# Patient Record
Sex: MALE | Race: WHITE | NOT HISPANIC OR LATINO | Employment: OTHER | URBAN - METROPOLITAN AREA
[De-identification: names, ages, dates, MRNs, and addresses within clinical notes are randomized per-mention and may not be internally consistent; named-entity substitution may affect disease eponyms.]

---

## 2017-03-01 ENCOUNTER — COMMUNICATION - HEALTHEAST (OUTPATIENT)
Dept: INTERNAL MEDICINE | Facility: CLINIC | Age: 62
End: 2017-03-01

## 2017-03-01 DIAGNOSIS — E11.9 TYPE 2 DIABETES MELLITUS WITHOUT COMPLICATION (H): ICD-10-CM

## 2017-03-01 DIAGNOSIS — E78.5 HYPERLIPEMIA: ICD-10-CM

## 2017-03-01 DIAGNOSIS — I10 HYPERTENSION: ICD-10-CM

## 2017-03-22 ENCOUNTER — RECORDS - HEALTHEAST (OUTPATIENT)
Dept: ADMINISTRATIVE | Facility: OTHER | Age: 62
End: 2017-03-22

## 2017-03-25 ENCOUNTER — COMMUNICATION - HEALTHEAST (OUTPATIENT)
Dept: INTERNAL MEDICINE | Facility: CLINIC | Age: 62
End: 2017-03-25

## 2017-03-25 DIAGNOSIS — E11.9 TYPE 2 DIABETES MELLITUS WITHOUT COMPLICATION (H): ICD-10-CM

## 2017-03-30 ENCOUNTER — OFFICE VISIT - HEALTHEAST (OUTPATIENT)
Dept: INTERNAL MEDICINE | Facility: CLINIC | Age: 62
End: 2017-03-30

## 2017-03-30 DIAGNOSIS — R73.9 HYPERGLYCEMIA: ICD-10-CM

## 2017-03-30 DIAGNOSIS — I10 ESSENTIAL HYPERTENSION: ICD-10-CM

## 2017-03-30 DIAGNOSIS — Z00.00 ROUTINE GENERAL MEDICAL EXAMINATION AT A HEALTH CARE FACILITY: ICD-10-CM

## 2017-03-30 DIAGNOSIS — H93.19 TINNITUS: ICD-10-CM

## 2017-03-30 DIAGNOSIS — I25.10 CORONARY ARTERY CALCIFICATION SEEN ON CAT SCAN: ICD-10-CM

## 2017-03-30 DIAGNOSIS — Z12.5 SPECIAL SCREENING FOR MALIGNANT NEOPLASM OF PROSTATE: ICD-10-CM

## 2017-03-30 DIAGNOSIS — E04.1 RIGHT THYROID NODULE: ICD-10-CM

## 2017-03-30 LAB
CHOLEST SERPL-MCNC: 84 MG/DL
FASTING STATUS PATIENT QL REPORTED: ABNORMAL
HBA1C MFR BLD: 6 % (ref 3.5–6)
HDLC SERPL-MCNC: 31 MG/DL
LDLC SERPL CALC-MCNC: 39 MG/DL
PSA SERPL-MCNC: 0.3 NG/ML (ref 0–4.5)
TRIGL SERPL-MCNC: 72 MG/DL

## 2017-03-30 ASSESSMENT — MIFFLIN-ST. JEOR: SCORE: 1855.53

## 2017-04-05 ENCOUNTER — HOSPITAL ENCOUNTER (OUTPATIENT)
Dept: ULTRASOUND IMAGING | Facility: HOSPITAL | Age: 62
Discharge: HOME | End: 2017-04-05
Attending: INTERNAL MEDICINE

## 2017-04-05 DIAGNOSIS — E04.1 RIGHT THYROID NODULE: ICD-10-CM

## 2017-04-09 ENCOUNTER — COMMUNICATION - HEALTHEAST (OUTPATIENT)
Dept: INTERNAL MEDICINE | Facility: CLINIC | Age: 62
End: 2017-04-09

## 2017-04-09 DIAGNOSIS — A60.00 GENITAL HERPES, UNSPECIFIED: ICD-10-CM

## 2017-04-11 ENCOUNTER — COMMUNICATION - HEALTHEAST (OUTPATIENT)
Dept: INTERNAL MEDICINE | Facility: CLINIC | Age: 62
End: 2017-04-11

## 2017-04-11 DIAGNOSIS — I10 HYPERTENSION: ICD-10-CM

## 2017-04-11 DIAGNOSIS — A60.00 GENITAL HERPES, UNSPECIFIED: ICD-10-CM

## 2017-04-13 ENCOUNTER — COMMUNICATION - HEALTHEAST (OUTPATIENT)
Dept: INTERNAL MEDICINE | Facility: CLINIC | Age: 62
End: 2017-04-13

## 2017-05-20 ENCOUNTER — COMMUNICATION - HEALTHEAST (OUTPATIENT)
Dept: INTERNAL MEDICINE | Facility: CLINIC | Age: 62
End: 2017-05-20

## 2017-05-20 DIAGNOSIS — I10 HYPERTENSION: ICD-10-CM

## 2017-05-27 ENCOUNTER — COMMUNICATION - HEALTHEAST (OUTPATIENT)
Dept: INTERNAL MEDICINE | Facility: CLINIC | Age: 62
End: 2017-05-27

## 2017-05-27 DIAGNOSIS — E11.9 TYPE 2 DIABETES MELLITUS WITHOUT COMPLICATION (H): ICD-10-CM

## 2017-06-29 ENCOUNTER — COMMUNICATION - HEALTHEAST (OUTPATIENT)
Dept: INTERNAL MEDICINE | Facility: CLINIC | Age: 62
End: 2017-06-29

## 2017-06-29 DIAGNOSIS — I10 HYPERTENSION: ICD-10-CM

## 2017-06-30 ENCOUNTER — COMMUNICATION - HEALTHEAST (OUTPATIENT)
Dept: INTERNAL MEDICINE | Facility: CLINIC | Age: 62
End: 2017-06-30

## 2017-06-30 DIAGNOSIS — E78.5 HYPERLIPEMIA: ICD-10-CM

## 2017-06-30 DIAGNOSIS — A60.00 GENITAL HERPES, UNSPECIFIED: ICD-10-CM

## 2017-07-06 ENCOUNTER — COMMUNICATION - HEALTHEAST (OUTPATIENT)
Dept: INTERNAL MEDICINE | Facility: CLINIC | Age: 62
End: 2017-07-06

## 2017-07-06 DIAGNOSIS — I10 HYPERTENSION: ICD-10-CM

## 2017-09-05 ENCOUNTER — OFFICE VISIT - HEALTHEAST (OUTPATIENT)
Dept: INTERNAL MEDICINE | Facility: CLINIC | Age: 62
End: 2017-09-05

## 2017-09-05 DIAGNOSIS — I25.10 CORONARY ARTERY CALCIFICATION SEEN ON CAT SCAN: ICD-10-CM

## 2017-09-05 DIAGNOSIS — E04.1 THYROID NODULE: ICD-10-CM

## 2017-09-05 DIAGNOSIS — I10 ESSENTIAL HYPERTENSION: ICD-10-CM

## 2017-09-05 DIAGNOSIS — R73.09 ELEVATED GLUCOSE: ICD-10-CM

## 2017-09-05 LAB — HBA1C MFR BLD: 6.1 % (ref 3.5–6)

## 2017-09-06 ENCOUNTER — HOSPITAL ENCOUNTER (OUTPATIENT)
Dept: ULTRASOUND IMAGING | Facility: HOSPITAL | Age: 62
Discharge: HOME | End: 2017-09-06
Attending: INTERNAL MEDICINE

## 2017-09-06 DIAGNOSIS — E04.1 THYROID NODULE: ICD-10-CM

## 2017-12-12 ENCOUNTER — RECORDS - HEALTHEAST (OUTPATIENT)
Dept: ADMINISTRATIVE | Facility: OTHER | Age: 62
End: 2017-12-12

## 2017-12-27 ENCOUNTER — OFFICE VISIT - HEALTHEAST (OUTPATIENT)
Dept: AUDIOLOGY | Facility: CLINIC | Age: 62
End: 2017-12-27

## 2017-12-27 ENCOUNTER — OFFICE VISIT - HEALTHEAST (OUTPATIENT)
Dept: OTOLARYNGOLOGY | Facility: CLINIC | Age: 62
End: 2017-12-27

## 2017-12-27 DIAGNOSIS — H93.13 BILATERAL TINNITUS: ICD-10-CM

## 2017-12-27 DIAGNOSIS — H93.13 TINNITUS OF BOTH EARS: ICD-10-CM

## 2017-12-27 DIAGNOSIS — H90.3 BILATERAL SENSORINEURAL HEARING LOSS: ICD-10-CM

## 2017-12-27 DIAGNOSIS — H90.3 SENSORINEURAL HEARING LOSS, ASYMMETRICAL: ICD-10-CM

## 2018-03-15 ENCOUNTER — RECORDS - HEALTHEAST (OUTPATIENT)
Dept: ADMINISTRATIVE | Facility: OTHER | Age: 63
End: 2018-03-15

## 2018-03-27 ENCOUNTER — COMMUNICATION - HEALTHEAST (OUTPATIENT)
Dept: INTERNAL MEDICINE | Facility: CLINIC | Age: 63
End: 2018-03-27

## 2018-03-28 ENCOUNTER — COMMUNICATION - HEALTHEAST (OUTPATIENT)
Dept: INTERNAL MEDICINE | Facility: CLINIC | Age: 63
End: 2018-03-28

## 2018-04-04 ENCOUNTER — RECORDS - HEALTHEAST (OUTPATIENT)
Dept: ADMINISTRATIVE | Facility: OTHER | Age: 63
End: 2018-04-04

## 2018-04-25 ENCOUNTER — COMMUNICATION - HEALTHEAST (OUTPATIENT)
Dept: INTERNAL MEDICINE | Facility: CLINIC | Age: 63
End: 2018-04-25

## 2018-04-27 ENCOUNTER — COMMUNICATION - HEALTHEAST (OUTPATIENT)
Dept: INTERNAL MEDICINE | Facility: CLINIC | Age: 63
End: 2018-04-27

## 2018-06-05 ENCOUNTER — COMMUNICATION - HEALTHEAST (OUTPATIENT)
Dept: INTERNAL MEDICINE | Facility: CLINIC | Age: 63
End: 2018-06-05

## 2018-06-06 ENCOUNTER — COMMUNICATION - HEALTHEAST (OUTPATIENT)
Dept: INTERNAL MEDICINE | Facility: CLINIC | Age: 63
End: 2018-06-06

## 2018-06-18 ENCOUNTER — OFFICE VISIT - HEALTHEAST (OUTPATIENT)
Dept: INTERNAL MEDICINE | Facility: CLINIC | Age: 63
End: 2018-06-18

## 2018-06-18 DIAGNOSIS — I10 ESSENTIAL HYPERTENSION: ICD-10-CM

## 2018-06-18 DIAGNOSIS — R73.09 ELEVATED GLUCOSE: ICD-10-CM

## 2018-06-18 DIAGNOSIS — A60.00 GENITAL HERPES SIMPLEX, UNSPECIFIED SITE: ICD-10-CM

## 2018-06-18 DIAGNOSIS — E78.5 HYPERLIPIDEMIA, UNSPECIFIED HYPERLIPIDEMIA TYPE: ICD-10-CM

## 2018-06-22 ENCOUNTER — RECORDS - HEALTHEAST (OUTPATIENT)
Dept: ADMINISTRATIVE | Facility: OTHER | Age: 63
End: 2018-06-22

## 2018-08-23 ENCOUNTER — COMMUNICATION - HEALTHEAST (OUTPATIENT)
Dept: INTERNAL MEDICINE | Facility: CLINIC | Age: 63
End: 2018-08-23

## 2018-08-23 DIAGNOSIS — I10 ESSENTIAL HYPERTENSION: ICD-10-CM

## 2018-09-05 ENCOUNTER — RECORDS - HEALTHEAST (OUTPATIENT)
Dept: ADMINISTRATIVE | Facility: OTHER | Age: 63
End: 2018-09-05

## 2018-09-10 ENCOUNTER — OFFICE VISIT - HEALTHEAST (OUTPATIENT)
Dept: INTERNAL MEDICINE | Facility: CLINIC | Age: 63
End: 2018-09-10

## 2018-09-10 DIAGNOSIS — Z12.11 SCREEN FOR COLON CANCER: ICD-10-CM

## 2018-09-11 ENCOUNTER — COMMUNICATION - HEALTHEAST (OUTPATIENT)
Dept: INTERNAL MEDICINE | Facility: CLINIC | Age: 63
End: 2018-09-11

## 2018-09-17 ENCOUNTER — OFFICE VISIT - HEALTHEAST (OUTPATIENT)
Dept: INTERNAL MEDICINE | Facility: CLINIC | Age: 63
End: 2018-09-17

## 2018-09-17 DIAGNOSIS — Z00.00 ROUTINE GENERAL MEDICAL EXAMINATION AT A HEALTH CARE FACILITY: ICD-10-CM

## 2018-09-17 DIAGNOSIS — I10 ESSENTIAL HYPERTENSION: ICD-10-CM

## 2018-09-17 DIAGNOSIS — E04.1 THYROID NODULE: ICD-10-CM

## 2018-09-17 DIAGNOSIS — E11.9 DIABETES MELLITUS, TYPE 2 (H): ICD-10-CM

## 2018-09-17 LAB
ALBUMIN SERPL-MCNC: 4.3 G/DL (ref 3.5–5)
ALP SERPL-CCNC: 63 U/L (ref 45–120)
ALT SERPL W P-5'-P-CCNC: 44 U/L (ref 0–45)
ANION GAP SERPL CALCULATED.3IONS-SCNC: 10 MMOL/L (ref 5–18)
AST SERPL W P-5'-P-CCNC: 28 U/L (ref 0–40)
BILIRUB SERPL-MCNC: 0.7 MG/DL (ref 0–1)
BUN SERPL-MCNC: 14 MG/DL (ref 8–22)
CALCIUM SERPL-MCNC: 9.7 MG/DL (ref 8.5–10.5)
CHLORIDE BLD-SCNC: 100 MMOL/L (ref 98–107)
CO2 SERPL-SCNC: 27 MMOL/L (ref 22–31)
CREAT SERPL-MCNC: 0.78 MG/DL (ref 0.7–1.3)
CREAT UR-MCNC: 97.1 MG/DL
GFR SERPL CREATININE-BSD FRML MDRD: >60 ML/MIN/1.73M2
GLUCOSE BLD-MCNC: 81 MG/DL (ref 70–125)
HBA1C MFR BLD: 6.3 % (ref 3.5–6)
LDLC SERPL CALC-MCNC: 59 MG/DL
MICROALBUMIN UR-MCNC: <0.5 MG/DL (ref 0–1.99)
MICROALBUMIN/CREAT UR: NORMAL MG/G
POTASSIUM BLD-SCNC: 4.5 MMOL/L (ref 3.5–5)
PROT SERPL-MCNC: 6.9 G/DL (ref 6–8)
PSA SERPL-MCNC: 0.3 NG/ML (ref 0–4.5)
SODIUM SERPL-SCNC: 137 MMOL/L (ref 136–145)

## 2018-09-17 ASSESSMENT — MIFFLIN-ST. JEOR: SCORE: 1876.11

## 2018-09-27 ENCOUNTER — HOSPITAL ENCOUNTER (OUTPATIENT)
Dept: ULTRASOUND IMAGING | Facility: HOSPITAL | Age: 63
Discharge: HOME | End: 2018-09-27
Attending: INTERNAL MEDICINE

## 2018-09-27 DIAGNOSIS — E04.1 THYROID NODULE: ICD-10-CM

## 2018-10-04 ENCOUNTER — COMMUNICATION - HEALTHEAST (OUTPATIENT)
Dept: FAMILY MEDICINE | Facility: CLINIC | Age: 63
End: 2018-10-04

## 2018-10-04 ENCOUNTER — COMMUNICATION - HEALTHEAST (OUTPATIENT)
Dept: INTERNAL MEDICINE | Facility: CLINIC | Age: 63
End: 2018-10-04

## 2018-10-04 DIAGNOSIS — A60.00 GENITAL HERPES SIMPLEX, UNSPECIFIED SITE: ICD-10-CM

## 2018-11-29 ENCOUNTER — COMMUNICATION - HEALTHEAST (OUTPATIENT)
Dept: INTERNAL MEDICINE | Facility: CLINIC | Age: 63
End: 2018-11-29

## 2018-12-10 ENCOUNTER — COMMUNICATION - HEALTHEAST (OUTPATIENT)
Dept: INTERNAL MEDICINE | Facility: CLINIC | Age: 63
End: 2018-12-10

## 2018-12-10 DIAGNOSIS — I10 ESSENTIAL HYPERTENSION: ICD-10-CM

## 2018-12-12 ENCOUNTER — COMMUNICATION - HEALTHEAST (OUTPATIENT)
Dept: INTERNAL MEDICINE | Facility: CLINIC | Age: 63
End: 2018-12-12

## 2019-02-05 ENCOUNTER — AMBULATORY - HEALTHEAST (OUTPATIENT)
Dept: LAB | Facility: CLINIC | Age: 64
End: 2019-02-05

## 2019-02-05 ENCOUNTER — OFFICE VISIT - HEALTHEAST (OUTPATIENT)
Dept: FAMILY MEDICINE | Facility: CLINIC | Age: 64
End: 2019-02-05

## 2019-02-05 ENCOUNTER — HOSPITAL ENCOUNTER (OUTPATIENT)
Dept: ULTRASOUND IMAGING | Facility: CLINIC | Age: 64
Discharge: HOME | End: 2019-02-05

## 2019-02-05 DIAGNOSIS — N50.811 PAIN IN RIGHT TESTICLE: ICD-10-CM

## 2019-02-05 DIAGNOSIS — N45.1 EPIDIDYMITIS: ICD-10-CM

## 2019-02-05 DIAGNOSIS — N45.3 EPIDIDYMOORCHITIS: ICD-10-CM

## 2019-02-05 LAB
ALBUMIN UR-MCNC: NEGATIVE MG/DL
APPEARANCE UR: CLEAR
BILIRUB UR QL STRIP: NEGATIVE
COLOR UR AUTO: YELLOW
GLUCOSE UR STRIP-MCNC: NEGATIVE MG/DL
HGB UR QL STRIP: NEGATIVE
KETONES UR STRIP-MCNC: NEGATIVE MG/DL
LEUKOCYTE ESTERASE UR QL STRIP: NEGATIVE
NITRATE UR QL: NEGATIVE
PH UR STRIP: 7 [PH] (ref 4.5–8)
SP GR UR STRIP: 1.01 (ref 1–1.03)
UROBILINOGEN UR STRIP-ACNC: NORMAL

## 2019-03-08 ENCOUNTER — COMMUNICATION - HEALTHEAST (OUTPATIENT)
Dept: INTERNAL MEDICINE | Facility: CLINIC | Age: 64
End: 2019-03-08

## 2019-03-08 DIAGNOSIS — I10 ESSENTIAL HYPERTENSION: ICD-10-CM

## 2019-06-06 ENCOUNTER — COMMUNICATION - HEALTHEAST (OUTPATIENT)
Dept: INTERNAL MEDICINE | Facility: CLINIC | Age: 64
End: 2019-06-06

## 2019-06-06 DIAGNOSIS — I10 ESSENTIAL HYPERTENSION: ICD-10-CM

## 2019-07-03 ENCOUNTER — RECORDS - HEALTHEAST (OUTPATIENT)
Dept: ADMINISTRATIVE | Facility: OTHER | Age: 64
End: 2019-07-03

## 2019-07-03 ENCOUNTER — COMMUNICATION - HEALTHEAST (OUTPATIENT)
Dept: INTERNAL MEDICINE | Facility: CLINIC | Age: 64
End: 2019-07-03

## 2019-08-06 ENCOUNTER — RECORDS - HEALTHEAST (OUTPATIENT)
Dept: ADMINISTRATIVE | Facility: OTHER | Age: 64
End: 2019-08-06

## 2019-08-26 ENCOUNTER — COMMUNICATION - HEALTHEAST (OUTPATIENT)
Dept: INTERNAL MEDICINE | Facility: CLINIC | Age: 64
End: 2019-08-26

## 2019-08-26 DIAGNOSIS — I10 ESSENTIAL HYPERTENSION: ICD-10-CM

## 2019-08-28 ENCOUNTER — COMMUNICATION - HEALTHEAST (OUTPATIENT)
Dept: INTERNAL MEDICINE | Facility: CLINIC | Age: 64
End: 2019-08-28

## 2019-08-28 DIAGNOSIS — I10 ESSENTIAL HYPERTENSION: ICD-10-CM

## 2019-08-28 DIAGNOSIS — E78.5 HYPERLIPIDEMIA, UNSPECIFIED HYPERLIPIDEMIA TYPE: ICD-10-CM

## 2019-08-30 ENCOUNTER — COMMUNICATION - HEALTHEAST (OUTPATIENT)
Dept: INTERNAL MEDICINE | Facility: CLINIC | Age: 64
End: 2019-08-30

## 2019-09-04 ENCOUNTER — COMMUNICATION - HEALTHEAST (OUTPATIENT)
Dept: INTERNAL MEDICINE | Facility: CLINIC | Age: 64
End: 2019-09-04

## 2019-09-11 ENCOUNTER — RECORDS - HEALTHEAST (OUTPATIENT)
Dept: ADMINISTRATIVE | Facility: OTHER | Age: 64
End: 2019-09-11

## 2019-09-19 ENCOUNTER — TRANSFERRED RECORDS (OUTPATIENT)
Dept: HEALTH INFORMATION MANAGEMENT | Facility: CLINIC | Age: 64
End: 2019-09-19

## 2019-09-19 ENCOUNTER — OFFICE VISIT - HEALTHEAST (OUTPATIENT)
Dept: INTERNAL MEDICINE | Facility: CLINIC | Age: 64
End: 2019-09-19

## 2019-09-19 DIAGNOSIS — Z12.11 SCREEN FOR COLON CANCER: ICD-10-CM

## 2019-09-19 DIAGNOSIS — E04.1 THYROID NODULE: ICD-10-CM

## 2019-09-19 DIAGNOSIS — E11.9 TYPE 2 DIABETES MELLITUS WITHOUT COMPLICATION, WITHOUT LONG-TERM CURRENT USE OF INSULIN (H): ICD-10-CM

## 2019-09-19 DIAGNOSIS — Z00.01 ENCOUNTER FOR GENERAL ADULT MEDICAL EXAMINATION WITH ABNORMAL FINDINGS: ICD-10-CM

## 2019-09-19 DIAGNOSIS — Z12.5 SCREENING FOR PROSTATE CANCER: ICD-10-CM

## 2019-09-19 DIAGNOSIS — E78.5 HYPERLIPIDEMIA, UNSPECIFIED HYPERLIPIDEMIA TYPE: ICD-10-CM

## 2019-09-19 DIAGNOSIS — Z23 NEED FOR IMMUNIZATION AGAINST INFLUENZA: ICD-10-CM

## 2019-09-19 DIAGNOSIS — Z23 NEED FOR PNEUMOCOCCAL VACCINATION: ICD-10-CM

## 2019-09-19 DIAGNOSIS — I10 ESSENTIAL HYPERTENSION: ICD-10-CM

## 2019-09-19 LAB
ALBUMIN SERPL-MCNC: 4.4 G/DL (ref 3.5–5)
ALP SERPL-CCNC: 74 U/L (ref 45–120)
ALT SERPL W P-5'-P-CCNC: 25 U/L (ref 0–45)
ANION GAP SERPL CALCULATED.3IONS-SCNC: 10 MMOL/L (ref 5–18)
AST SERPL W P-5'-P-CCNC: 22 U/L (ref 0–40)
BILIRUB SERPL-MCNC: 0.6 MG/DL (ref 0–1)
BUN SERPL-MCNC: 15 MG/DL (ref 8–22)
CALCIUM SERPL-MCNC: 9.8 MG/DL (ref 8.5–10.5)
CHLORIDE BLD-SCNC: 97 MMOL/L (ref 98–107)
CHOLEST SERPL-MCNC: 108 MG/DL
CO2 SERPL-SCNC: 26 MMOL/L (ref 22–31)
CREAT SERPL-MCNC: 0.82 MG/DL (ref 0.7–1.3)
CREAT UR-MCNC: 85.7 MG/DL
FASTING STATUS PATIENT QL REPORTED: NO
GFR SERPL CREATININE-BSD FRML MDRD: >60 ML/MIN/1.73M2
GLUCOSE BLD-MCNC: 93 MG/DL (ref 70–125)
HBA1C MFR BLD: 6.3 % (ref 3.5–6)
HDLC SERPL-MCNC: 33 MG/DL
LDLC SERPL CALC-MCNC: 40 MG/DL
MICROALBUMIN UR-MCNC: <0.5 MG/DL (ref 0–1.99)
MICROALBUMIN/CREAT UR: NORMAL MG/G{CREAT}
POTASSIUM BLD-SCNC: 4.7 MMOL/L (ref 3.5–5)
PROT SERPL-MCNC: 7 G/DL (ref 6–8)
PSA SERPL-MCNC: 0.4 NG/ML (ref 0–4.5)
SODIUM SERPL-SCNC: 133 MMOL/L (ref 136–145)
TRIGL SERPL-MCNC: 173 MG/DL

## 2019-09-19 ASSESSMENT — MIFFLIN-ST. JEOR: SCORE: 1874.87

## 2019-09-20 ENCOUNTER — COMMUNICATION - HEALTHEAST (OUTPATIENT)
Dept: INTERNAL MEDICINE | Facility: CLINIC | Age: 64
End: 2019-09-20

## 2019-09-23 ENCOUNTER — HOSPITAL ENCOUNTER (OUTPATIENT)
Dept: ULTRASOUND IMAGING | Facility: HOSPITAL | Age: 64
Discharge: HOME | End: 2019-09-23
Attending: INTERNAL MEDICINE

## 2019-09-23 DIAGNOSIS — E04.1 THYROID NODULE: ICD-10-CM

## 2019-09-30 ENCOUNTER — COMMUNICATION - HEALTHEAST (OUTPATIENT)
Dept: INTERNAL MEDICINE | Facility: CLINIC | Age: 64
End: 2019-09-30

## 2019-09-30 DIAGNOSIS — I10 ESSENTIAL HYPERTENSION: ICD-10-CM

## 2019-09-30 DIAGNOSIS — E78.5 HYPERLIPIDEMIA, UNSPECIFIED HYPERLIPIDEMIA TYPE: ICD-10-CM

## 2019-12-23 ENCOUNTER — COMMUNICATION - HEALTHEAST (OUTPATIENT)
Dept: INTERNAL MEDICINE | Facility: CLINIC | Age: 64
End: 2019-12-23

## 2019-12-23 ENCOUNTER — OFFICE VISIT - HEALTHEAST (OUTPATIENT)
Dept: INTERNAL MEDICINE | Facility: CLINIC | Age: 64
End: 2019-12-23

## 2019-12-23 DIAGNOSIS — L98.9 SKIN LESION: ICD-10-CM

## 2019-12-23 DIAGNOSIS — I10 ESSENTIAL HYPERTENSION: ICD-10-CM

## 2019-12-23 DIAGNOSIS — A60.00 GENITAL HERPES SIMPLEX, UNSPECIFIED SITE: ICD-10-CM

## 2019-12-24 ENCOUNTER — COMMUNICATION - HEALTHEAST (OUTPATIENT)
Dept: INTERNAL MEDICINE | Facility: CLINIC | Age: 64
End: 2019-12-24

## 2019-12-26 ENCOUNTER — COMMUNICATION - HEALTHEAST (OUTPATIENT)
Dept: INTERNAL MEDICINE | Facility: CLINIC | Age: 64
End: 2019-12-26

## 2019-12-27 ENCOUNTER — COMMUNICATION - HEALTHEAST (OUTPATIENT)
Dept: SCHEDULING | Facility: CLINIC | Age: 64
End: 2019-12-27

## 2019-12-27 DIAGNOSIS — E78.5 HYPERLIPIDEMIA, UNSPECIFIED HYPERLIPIDEMIA TYPE: ICD-10-CM

## 2020-01-02 ENCOUNTER — COMMUNICATION - HEALTHEAST (OUTPATIENT)
Dept: INTERNAL MEDICINE | Facility: CLINIC | Age: 65
End: 2020-01-02

## 2020-01-02 DIAGNOSIS — M54.16 LUMBAR RADICULOPATHY: ICD-10-CM

## 2020-01-20 ENCOUNTER — COMMUNICATION - HEALTHEAST (OUTPATIENT)
Dept: INTERNAL MEDICINE | Facility: CLINIC | Age: 65
End: 2020-01-20

## 2020-09-17 ENCOUNTER — COMMUNICATION - HEALTHEAST (OUTPATIENT)
Dept: INTERNAL MEDICINE | Facility: CLINIC | Age: 65
End: 2020-09-17

## 2020-09-17 DIAGNOSIS — A60.00 GENITAL HERPES SIMPLEX, UNSPECIFIED SITE: ICD-10-CM

## 2020-09-23 ENCOUNTER — TRANSFERRED RECORDS (OUTPATIENT)
Dept: HEALTH INFORMATION MANAGEMENT | Facility: CLINIC | Age: 65
End: 2020-09-23

## 2020-10-01 ENCOUNTER — TRANSFERRED RECORDS (OUTPATIENT)
Dept: HEALTH INFORMATION MANAGEMENT | Facility: CLINIC | Age: 65
End: 2020-10-01

## 2020-10-01 LAB — RETINOPATHY: NORMAL

## 2020-10-15 ENCOUNTER — OFFICE VISIT (OUTPATIENT)
Dept: FAMILY MEDICINE | Facility: CLINIC | Age: 65
End: 2020-10-15
Payer: MEDICARE

## 2020-10-15 ENCOUNTER — TELEPHONE (OUTPATIENT)
Dept: FAMILY MEDICINE | Facility: CLINIC | Age: 65
End: 2020-10-15

## 2020-10-15 VITALS
SYSTOLIC BLOOD PRESSURE: 138 MMHG | BODY MASS INDEX: 32.53 KG/M2 | RESPIRATION RATE: 16 BRPM | TEMPERATURE: 98 F | HEIGHT: 71 IN | OXYGEN SATURATION: 97 % | DIASTOLIC BLOOD PRESSURE: 80 MMHG | HEART RATE: 63 BPM | WEIGHT: 232.4 LBS

## 2020-10-15 DIAGNOSIS — E78.2 MIXED HYPERLIPIDEMIA: ICD-10-CM

## 2020-10-15 DIAGNOSIS — A60.01 HERPES SIMPLEX INFECTION OF PENIS: ICD-10-CM

## 2020-10-15 DIAGNOSIS — M54.41 CHRONIC LOW BACK PAIN WITH RIGHT-SIDED SCIATICA, UNSPECIFIED BACK PAIN LATERALITY: ICD-10-CM

## 2020-10-15 DIAGNOSIS — I10 BENIGN ESSENTIAL HYPERTENSION: Primary | ICD-10-CM

## 2020-10-15 DIAGNOSIS — E11.9 TYPE 2 DIABETES MELLITUS WITHOUT COMPLICATION, WITHOUT LONG-TERM CURRENT USE OF INSULIN (H): ICD-10-CM

## 2020-10-15 DIAGNOSIS — Z11.59 NEED FOR HEPATITIS C SCREENING TEST: ICD-10-CM

## 2020-10-15 DIAGNOSIS — G89.29 CHRONIC LOW BACK PAIN WITH RIGHT-SIDED SCIATICA, UNSPECIFIED BACK PAIN LATERALITY: ICD-10-CM

## 2020-10-15 DIAGNOSIS — Z12.11 SCREENING FOR MALIGNANT NEOPLASM OF COLON: ICD-10-CM

## 2020-10-15 DIAGNOSIS — Z12.5 SCREENING FOR PROSTATE CANCER: ICD-10-CM

## 2020-10-15 DIAGNOSIS — Z23 NEED FOR PROPHYLACTIC VACCINATION AND INOCULATION AGAINST INFLUENZA: ICD-10-CM

## 2020-10-15 PROBLEM — E78.5 HYPERLIPEMIA: Status: ACTIVE | Noted: 2020-10-15

## 2020-10-15 PROBLEM — A60.00 GENITAL HERPES: Status: ACTIVE | Noted: 2020-10-15

## 2020-10-15 PROBLEM — D12.6 BENIGN NEOPLASM OF COLON: Status: ACTIVE | Noted: 2020-10-15

## 2020-10-15 PROBLEM — E04.1 RIGHT THYROID NODULE: Status: ACTIVE | Noted: 2018-09-17

## 2020-10-15 LAB
CREAT UR-MCNC: 82 MG/DL
MICROALBUMIN UR-MCNC: 5 MG/L
MICROALBUMIN/CREAT UR: 6.21 MG/G CR (ref 0–17)

## 2020-10-15 PROCEDURE — 99204 OFFICE O/P NEW MOD 45 MIN: CPT | Mod: 25 | Performed by: FAMILY MEDICINE

## 2020-10-15 PROCEDURE — 90662 IIV NO PRSV INCREASED AG IM: CPT | Performed by: FAMILY MEDICINE

## 2020-10-15 PROCEDURE — 82043 UR ALBUMIN QUANTITATIVE: CPT | Performed by: FAMILY MEDICINE

## 2020-10-15 PROCEDURE — 99207 PR FOOT EXAM NO CHARGE: CPT | Performed by: FAMILY MEDICINE

## 2020-10-15 PROCEDURE — G0008 ADMIN INFLUENZA VIRUS VAC: HCPCS | Performed by: FAMILY MEDICINE

## 2020-10-15 RX ORDER — LISINOPRIL 20 MG/1
20 TABLET ORAL DAILY
Qty: 90 TABLET | Refills: 3 | Status: SHIPPED | OUTPATIENT
Start: 2020-10-15 | End: 2021-09-07

## 2020-10-15 RX ORDER — CHLORTHALIDONE 25 MG/1
12.5 TABLET ORAL
COMMUNITY
Start: 2019-12-24 | End: 2020-10-15

## 2020-10-15 RX ORDER — ACYCLOVIR 400 MG/1
400 TABLET ORAL 2 TIMES DAILY
Qty: 180 TABLET | Refills: 3 | Status: SHIPPED | OUTPATIENT
Start: 2020-10-15 | End: 2021-11-29

## 2020-10-15 RX ORDER — ATORVASTATIN CALCIUM 40 MG/1
40 TABLET, FILM COATED ORAL DAILY
Qty: 90 TABLET | Refills: 3 | Status: SHIPPED | OUTPATIENT
Start: 2020-10-15 | End: 2021-11-29

## 2020-10-15 RX ORDER — ACYCLOVIR 400 MG/1
400 TABLET ORAL 2 TIMES DAILY
COMMUNITY
End: 2020-10-15

## 2020-10-15 RX ORDER — LISINOPRIL 20 MG/1
TABLET ORAL
COMMUNITY
Start: 2019-03-09 | End: 2020-10-15

## 2020-10-15 RX ORDER — GABAPENTIN 300 MG/1
300 CAPSULE ORAL 2 TIMES DAILY
Qty: 180 CAPSULE | Refills: 3 | Status: SHIPPED | OUTPATIENT
Start: 2020-10-15 | End: 2021-07-12

## 2020-10-15 RX ORDER — ATORVASTATIN CALCIUM 40 MG/1
40 TABLET, FILM COATED ORAL
COMMUNITY
Start: 2019-12-27 | End: 2020-10-15

## 2020-10-15 RX ORDER — CHLORTHALIDONE 25 MG/1
12.5 TABLET ORAL DAILY
Qty: 45 TABLET | Refills: 3 | Status: SHIPPED | OUTPATIENT
Start: 2020-10-15 | End: 2021-02-08

## 2020-10-15 RX ORDER — GABAPENTIN 300 MG/1
300 CAPSULE ORAL DAILY
COMMUNITY
Start: 2019-11-26 | End: 2020-10-15

## 2020-10-15 ASSESSMENT — MIFFLIN-ST. JEOR: SCORE: 1851.29

## 2020-10-15 NOTE — TELEPHONE ENCOUNTER
Patient had colonoscopy done in 2019, needs to have this done every 5 years. Results are in care everywhere. Please abstract    Bianca Vieira, CMA

## 2020-10-15 NOTE — PATIENT INSTRUCTIONS
Schedule lab appoiuntment.    Refills of medications have been sent to pharmacy.    Be consistent with low salt, low trans fat and low saturated fat diet.  Eat food rich in omega-3-fatty acids as you tolerate. (salmon, olive oil)  Eat 5 cups of vegetables, fruits and whole grains per day.  Limit starchy food (white rice, white bread, white pasta, white potatoes) to less than a cup per meal.  Minimize sweets, junk food and fastfood. Limit soda beverages to one serving per day; best to avoid it altogether though.    Exercise: moderate intensity sustained for at least 30 mins per episode, goal of 150 mins per week at least  Combine cardiovascular and resistance exercises.  These exercise recommendations are in addition to your daily activity at work or home.  Work on losing weight.    Regular foot care; don't walk barefoot  Annual eye exam.  Please request your eye doctor to furnish a copy of the eye exam report to the clinic to be placed in your record.  Flu vaccine by the end of October yearly.  Be sure to update any other recommended vaccinations for diabetics.    Measure blood pressure at home. Be rested for 20 minutes.  If frequently greater than 140/90 in the next 2 weeks, follow up in clinic.

## 2020-10-15 NOTE — PROGRESS NOTES
Subjective     Jules Cheng is a 65 year old male who presents to clinic today for the following health issues:    Chief Complaint   Patient presents with     Establish Care     Was previously going to [facility] in [place], needs all of his medications refilled.      Imm/Inj     Flu Shot     Health Maintenance     Abstracted Colonoscopy         HPI         Hyperlipidemia Follow-Up      Are you regularly taking any medication or supplement to lower your cholesterol?   Yes- Lipitor     Are you having muscle aches or other side effects that you think could be caused by your cholesterol lowering medication?  No    Hypertension Follow-up      Do you check your blood pressure regularly outside of the clinic? No     Are you following a low salt diet? No     Are your blood pressures ever more than 140 on the top number (systolic) OR more   than 90 on the bottom number (diastolic), for example 140/90? No      How many servings of fruits and vegetables do you eat daily?  4 or more    On average, how many sweetened beverages do you drink each day (Examples: soda, juice, sweet tea, etc.  Do NOT count diet or artificially sweetened beverages)?   0    How many days per week do you exercise enough to make your heart beat faster? 3 or less    How many minutes a day do you exercise enough to make your heart beat faster? 30 - 60    How many days per week do you miss taking your medication? 0    Diabetes Follow-up      How often are you checking your blood sugar? Not at all    What concerns do you have today about your diabetes? None     Do you have any of these symptoms? (Select all that apply)  No numbness or tingling in feet.  No redness, sores or blisters on feet.  No complaints of excessive thirst.  No reports of blurry vision.  No significant changes to weight.    Have you had a diabetic eye exam in the last 12 months? Yes- Date of last eye exam: less than a month ago,  Location: [facility]    Takes Gabapentin for  "recurrent chronic lumbar pain with intermittent right sciatica. Patient reports this is controlled currently. Denies need for further assessment or tx.    Patient reports hx of genital HSV. On chronic suppression with acyclovir, taken for \"years now\". Denies recent breakouts.      BP Readings from Last 2 Encounters:   10/15/20 138/80     No results found for: A1C, LDL      How many servings of fruits and vegetables do you eat daily?  2-3    On average, how many sweetened beverages do you drink each day (Examples: soda, juice, sweet tea, etc.  Do NOT count diet or artificially sweetened beverages)?   0    How many days per week do you exercise enough to make your heart beat faster? 3 or less    How many minutes a day do you exercise enough to make your heart beat faster? 30 - 60    How many days per week do you miss taking your medication? 0    Patient Active Problem List   Diagnosis     Benign neoplasm of colon     Coronary artery calcification seen on CAT scan     Diabetes mellitus, type 2 (H)     Genital herpes     Hyperlipemia     Hypertension     Right thyroid nodule     Past Surgical History:   Procedure Laterality Date     JOINT REPLACEMENT      left and right total hip replacement        Social History     Tobacco Use     Smoking status: Former Smoker     Types: Cigarettes     Smokeless tobacco: Never Used   Substance Use Topics     Alcohol use: Not Currently     History reviewed. No pertinent family history.      Current Outpatient Medications   Medication Sig Dispense Refill     acyclovir (ZOVIRAX) 400 MG tablet Take 1 tablet (400 mg) by mouth 2 times daily 180 tablet 3     aspirin (ASA) 81 MG EC tablet Take 81 mg by mouth       atorvastatin (LIPITOR) 40 MG tablet Take 1 tablet (40 mg) by mouth daily 90 tablet 3     Calcium Carbonate (CALCIUM 600 PO) Take by mouth daily       chlorthalidone (HYGROTON) 25 MG tablet Take 0.5 tablets (12.5 mg) by mouth daily 45 tablet 3     cholecalciferol (VITAMIN D3) 25 mcg " "(1000 units) capsule Take 1,000 Units by mouth       Cyanocobalamin (VITAMIN B12 PO)        gabapentin (NEURONTIN) 300 MG capsule Take 1 capsule (300 mg) by mouth 2 times daily 180 capsule 3     lisinopril (ZESTRIL) 20 MG tablet Take 1 tablet (20 mg) by mouth daily 90 tablet 3     Allergies   Allergen Reactions     Metformin Itching     Perianal itch and some erythema and skin itch on arms etc.      Review of Systems   C: NEGATIVE for fever, chills, or change in weight  I: NEGATIVE for worrisome rashes, moles or lesions  E: NEGATIVE for vision changes or irritation  ENT: NEGATIVE for ear, mouth and throat problems  R: NEGATIVE for significant cough or SOB  CV: NEGATIVE for chest pain, palpitations or peripheral edema  GI: NEGATIVE for nausea, abdominal pain, heartburn, or change in bowel habits  :negative for dysuria, hematuria, decreased urinary stream, or discharge  M: NEGATIVE for significant arthralgias or myalgia  N: NEGATIVE for weakness, dizziness or paresthesias  E: NEGATIVE for temperature intolerance, skin/hair changes  H: NEGATIVE for bleeding problems  P: NEGATIVE for changes in mood or affect      Objective    /80   Pulse 63   Temp 98  F (36.7  C) (Tympanic)   Resp 16   Ht 1.803 m (5' 11\")   Wt 105.4 kg (232 lb 6.4 oz)   SpO2 97%   BMI 32.41 kg/m    Body mass index is 32.41 kg/m .  Physical Exam   GENERAL: alert and no distress, ambulatory w/o assist  EYES: no icterus; pink conjunctivae.  NECK: no tenderness, no adenopathy,  Thyroid not enlarged  RESP: lungs clear to auscultation - no rales, no rhonchi, no wheezes  CV: regular rates and rhythm, no murmur  MS: no edema  SKIN: no suspicious lesions, no rashes  NEURO: strength and tone- normal, sensory exam- grossly normal, mentation- intact, speech- normal, reflexes- symmetric  ABD:  nontender  FOOT EXAM: no ulcer/erythema; pedal pulses strong bilaterally; monofilament: no deficit bilaterally (patient did not feel over the thickened skin of " the great toes    No results found for this or any previous visit (from the past 24 hour(s)).        Assessment & Plan     Jules was seen today for establish care, imm/inj and health maintenance.    Diagnoses and all orders for this visit:    Benign essential hypertension  -     lisinopril (ZESTRIL) 20 MG tablet; Take 1 tablet (20 mg) by mouth daily  -     chlorthalidone (HYGROTON) 25 MG tablet; Take 0.5 tablets (12.5 mg) by mouth daily  -     Comprehensive metabolic panel; Future  Controlled.  Low salt, low fat diet.   Exercise as tolerated.  Take meds as prescribed; call if with side effects.     Type 2 diabetes mellitus without complication, without long-term current use of insulin (H)  -     Hemoglobin A1c; Future  -     Lipid panel reflex to direct LDL Fasting; Future  -     Albumin Random Urine Quantitative with Creat Ratio  -     lisinopril (ZESTRIL) 20 MG tablet; Take 1 tablet (20 mg) by mouth daily  -     atorvastatin (LIPITOR) 40 MG tablet; Take 1 tablet (40 mg) by mouth daily  -     Comprehensive metabolic panel; Future  -     FOOT EXAM  Reinforced carbohydrate control.  Regular exercise reinforced.  Regular foot care, annual eye exam.  Flu vaccine every year.    Mixed hyperlipidemia  -     Lipid panel reflex to direct LDL Fasting; Future  -     atorvastatin (LIPITOR) 40 MG tablet; Take 1 tablet (40 mg) by mouth daily  -     Comprehensive metabolic panel; Future  Reinforced heart healthy lifestyle.    Herpes simplex infection of penis  -     acyclovir (ZOVIRAX) 400 MG tablet; Take 1 tablet (400 mg) by mouth 2 times daily  Stable per patient.  Patient was advised of possible long-term adverse effect of antivirals. Consider weaning off in the near future. Patient is open to this.    Chronic low back pain with right-sided sciatica, unspecified back pain laterality  -     gabapentin (NEURONTIN) 300 MG capsule; Take 1 capsule (300 mg) by mouth 2 times daily  Stable per patient.    Need for prophylactic  "vaccination and inoculation against influenza  -     FLUZONE HIGH DOSE 65+  [80560]  -     Vaccine Administration, Initial [09880]      Need for hepatitis C screening test  -     **Hepatitis C Screen Reflex to RNA FUTURE anytime; Future    Screening for malignant neoplasm of colon  Patient is not due until 2-3 yrs form now per his recollection.  Last colonoscopy in Care Everywhere was 2018.    Screening for prostate cancer  -     Prostate spec antigen screen; Future           BMI:   Estimated body mass index is 32.41 kg/m  as calculated from the following:    Height as of this encounter: 1.803 m (5' 11\").    Weight as of this encounter: 105.4 kg (232 lb 6.4 oz).   Weight management plan: Discussed healthy diet and exercise guidelines         Patient Instructions   Schedule lab appoiuntment.    Refills of medications have been sent to pharmacy.    Be consistent with low salt, low trans fat and low saturated fat diet.  Eat food rich in omega-3-fatty acids as you tolerate. (salmon, olive oil)  Eat 5 cups of vegetables, fruits and whole grains per day.  Limit starchy food (white rice, white bread, white pasta, white potatoes) to less than a cup per meal.  Minimize sweets, junk food and fastfood. Limit soda beverages to one serving per day; best to avoid it altogether though.    Exercise: moderate intensity sustained for at least 30 mins per episode, goal of 150 mins per week at least  Combine cardiovascular and resistance exercises.  These exercise recommendations are in addition to your daily activity at work or home.  Work on losing weight.    Regular foot care; don't walk barefoot  Annual eye exam.  Please request your eye doctor to furnish a copy of the eye exam report to the clinic to be placed in your record.  Flu vaccine by the end of October yearly.  Be sure to update any other recommended vaccinations for diabetics.    Measure blood pressure at home. Be rested for 20 minutes.  If frequently greater than 140/90 " in the next 2 weeks, follow up in clinic.      Return in about 3 months (around 1/15/2021) for in case need to follow up on diabetes..    Mo Granados MD  [facility]

## 2020-10-22 DIAGNOSIS — E11.9 TYPE 2 DIABETES MELLITUS WITHOUT COMPLICATION, WITHOUT LONG-TERM CURRENT USE OF INSULIN (H): ICD-10-CM

## 2020-10-22 DIAGNOSIS — I10 BENIGN ESSENTIAL HYPERTENSION: ICD-10-CM

## 2020-10-22 DIAGNOSIS — E78.2 MIXED HYPERLIPIDEMIA: ICD-10-CM

## 2020-10-22 DIAGNOSIS — R73.01 IMPAIRED FASTING GLUCOSE: Primary | ICD-10-CM

## 2020-10-22 DIAGNOSIS — Z11.59 NEED FOR HEPATITIS C SCREENING TEST: ICD-10-CM

## 2020-10-22 DIAGNOSIS — Z12.5 SCREENING FOR PROSTATE CANCER: ICD-10-CM

## 2020-10-22 LAB
ALBUMIN SERPL-MCNC: 4.1 G/DL (ref 3.4–5)
ALP SERPL-CCNC: 67 U/L (ref 40–150)
ALT SERPL W P-5'-P-CCNC: 58 U/L (ref 0–70)
ANION GAP SERPL CALCULATED.3IONS-SCNC: 6 MMOL/L (ref 3–14)
AST SERPL W P-5'-P-CCNC: 33 U/L (ref 0–45)
BILIRUB SERPL-MCNC: 0.8 MG/DL (ref 0.2–1.3)
BUN SERPL-MCNC: 14 MG/DL (ref 7–30)
CALCIUM SERPL-MCNC: 9.2 MG/DL (ref 8.5–10.1)
CHLORIDE SERPL-SCNC: 101 MMOL/L (ref 94–109)
CHOLEST SERPL-MCNC: 111 MG/DL
CO2 SERPL-SCNC: 27 MMOL/L (ref 20–32)
CREAT SERPL-MCNC: 0.72 MG/DL (ref 0.66–1.25)
GFR SERPL CREATININE-BSD FRML MDRD: >90 ML/MIN/{1.73_M2}
GLUCOSE SERPL-MCNC: 144 MG/DL (ref 70–99)
HBA1C MFR BLD: 6.4 % (ref 0–5.6)
HDLC SERPL-MCNC: 37 MG/DL
LDLC SERPL CALC-MCNC: 47 MG/DL
NONHDLC SERPL-MCNC: 74 MG/DL
POTASSIUM SERPL-SCNC: 4 MMOL/L (ref 3.4–5.3)
PROT SERPL-MCNC: 7.4 G/DL (ref 6.8–8.8)
PSA SERPL-ACNC: 0.41 UG/L (ref 0–4)
SODIUM SERPL-SCNC: 134 MMOL/L (ref 133–144)
TRIGL SERPL-MCNC: 136 MG/DL

## 2020-10-22 PROCEDURE — 36415 COLL VENOUS BLD VENIPUNCTURE: CPT | Performed by: FAMILY MEDICINE

## 2020-10-22 PROCEDURE — 86803 HEPATITIS C AB TEST: CPT | Performed by: FAMILY MEDICINE

## 2020-10-22 PROCEDURE — 83036 HEMOGLOBIN GLYCOSYLATED A1C: CPT | Performed by: FAMILY MEDICINE

## 2020-10-22 PROCEDURE — 80053 COMPREHEN METABOLIC PANEL: CPT | Performed by: FAMILY MEDICINE

## 2020-10-22 PROCEDURE — 80061 LIPID PANEL: CPT | Performed by: FAMILY MEDICINE

## 2020-10-22 PROCEDURE — G0103 PSA SCREENING: HCPCS | Performed by: FAMILY MEDICINE

## 2020-10-23 ENCOUNTER — TELEPHONE (OUTPATIENT)
Dept: FAMILY MEDICINE | Facility: CLINIC | Age: 65
End: 2020-10-23

## 2020-10-23 LAB — HCV AB SERPL QL IA: NONREACTIVE

## 2020-10-23 NOTE — TELEPHONE ENCOUNTER
Patient is asking for the rest of his labs to be able to be viewed on [facility] only the A1c is none of the others.    Courtney Mack on 10/23/2020 at 10:04 AM

## 2020-11-10 DIAGNOSIS — R73.01 IMPAIRED FASTING GLUCOSE: ICD-10-CM

## 2020-11-10 LAB
GLUCOSE SERPL-MCNC: 100 MG/DL (ref 70–99)
HBA1C MFR BLD: 6.3 % (ref 0–5.6)

## 2020-11-10 PROCEDURE — 36415 COLL VENOUS BLD VENIPUNCTURE: CPT | Performed by: FAMILY MEDICINE

## 2020-11-10 PROCEDURE — 82947 ASSAY GLUCOSE BLOOD QUANT: CPT | Performed by: FAMILY MEDICINE

## 2020-11-10 PROCEDURE — 83036 HEMOGLOBIN GLYCOSYLATED A1C: CPT | Performed by: FAMILY MEDICINE

## 2021-01-10 ENCOUNTER — HEALTH MAINTENANCE LETTER (OUTPATIENT)
Age: 66
End: 2021-01-10

## 2021-02-02 ENCOUNTER — COMMUNICATION - HEALTHEAST (OUTPATIENT)
Dept: INTERNAL MEDICINE | Facility: CLINIC | Age: 66
End: 2021-02-02

## 2021-02-02 DIAGNOSIS — I10 ESSENTIAL HYPERTENSION: ICD-10-CM

## 2021-02-04 DIAGNOSIS — I10 BENIGN ESSENTIAL HYPERTENSION: ICD-10-CM

## 2021-02-04 NOTE — TELEPHONE ENCOUNTER
Pharmacy reports they donot have rx for chlorthalidone.     Please resend to [email or web address] mail order

## 2021-02-08 RX ORDER — CHLORTHALIDONE 25 MG/1
12.5 TABLET ORAL DAILY
Qty: 45 TABLET | Refills: 1 | Status: SHIPPED | OUTPATIENT
Start: 2021-02-08 | End: 2021-08-31

## 2021-02-08 NOTE — TELEPHONE ENCOUNTER
"Resent, despite the fact: E-Prescribing Status: Receipt confirmed by pharmacy (10/15/2020  9:36 AM CDT)  Adjustment made to refills.  Requested Prescriptions   Pending Prescriptions Disp Refills     chlorthalidone (HYGROTON) 25 MG tablet 45 tablet 3     Sig: Take 0.5 tablets (12.5 mg) by mouth daily       Diuretics (Including Combos) Protocol Passed - 2/4/2021 11:28 AM        Passed - Blood pressure under 140/90 in past 12 months     BP Readings from Last 3 Encounters:   10/15/20 138/80                 Passed - Recent (12 mo) or future (30 days) visit within the authorizing provider's specialty     Patient has had an office visit with the authorizing provider or a provider within the authorizing providers department within the previous 12 mos or has a future within next 30 days. See \"Patient Info\" tab in inbasket, or \"Choose Columns\" in Meds & Orders section of the refill encounter.              Passed - Medication is active on med list        Passed - Patient is age 18 or older        Passed - Normal serum creatinine on file in past 12 months     Recent Labs   Lab Test 10/22/20  0825   CR 0.72              Passed - Normal serum potassium on file in past 12 months     Recent Labs   Lab Test 10/22/20  0825   POTASSIUM 4.0                    Passed - Normal serum sodium on file in past 12 months     Recent Labs   Lab Test 10/22/20  0825                    Angelic HENNESSY RN, BSN      "

## 2021-03-13 ENCOUNTER — HEALTH MAINTENANCE LETTER (OUTPATIENT)
Age: 66
End: 2021-03-13

## 2021-05-26 VITALS — HEART RATE: 71 BPM | DIASTOLIC BLOOD PRESSURE: 80 MMHG | SYSTOLIC BLOOD PRESSURE: 132 MMHG

## 2021-05-28 ENCOUNTER — RECORDS - HEALTHEAST (OUTPATIENT)
Dept: ADMINISTRATIVE | Facility: CLINIC | Age: 66
End: 2021-05-28

## 2021-05-29 NOTE — TELEPHONE ENCOUNTER
Refill Approved    Rx renewed per Medication Renewal Policy. Medication was last renewed on 3/9/19.    Ov: 9/17/18    Samaria Bustamante, [facility] Triage/Med Refill 6/8/2019     Requested Prescriptions   Pending Prescriptions Disp Refills     lisinopril (PRINIVIL,ZESTRIL) 20 MG tablet [Pharmacy Med Name: LISINOPRIL TAB 20MG] 90 tablet 0     Sig: TAKE 1 TABLET DAILY       Ace Inhibitors Refill Protocol Passed - 6/6/2019 12:56 AM        Passed - PCP or prescribing provider visit in past 12 months       Last office visit with prescriber/PCP: Visit date not found OR same dept: 9/10/2018 Luan Horner MD OR same specialty: 9/10/2018 Luan Horner MD  Last physical: Visit date not found Last MTM visit: Visit date not found   Next visit within 3 mo: Visit date not found  Next physical within 3 mo: Visit date not found  Prescriber OR PCP: Martina Stoner MD  Last diagnosis associated with med order: 1. Hypertension  - lisinopril (PRINIVIL,ZESTRIL) 20 MG tablet [Pharmacy Med Name: LISINOPRIL TAB 20MG]; TAKE 1 TABLET DAILY  Dispense: 90 tablet; Refill: 0    If protocol passes may refill for 12 months if within 3 months of last provider visit (or a total of 15 months).             Passed - Serum Potassium in past 12 months     Lab Results   Component Value Date    Potassium 4.5 09/17/2018             Passed - Blood pressure filed in past 12 months     BP Readings from Last 1 Encounters:   02/05/19 (!) 163/98             Passed - Serum Creatinine in past 12 months     Creatinine   Date Value Ref Range Status   09/17/2018 0.78 0.70 - 1.30 mg/dL Final

## 2021-05-30 VITALS — BODY MASS INDEX: 32.42 KG/M2 | WEIGHT: 231.6 LBS | HEIGHT: 71 IN

## 2021-05-31 VITALS — BODY MASS INDEX: 31.36 KG/M2 | WEIGHT: 228 LBS

## 2021-05-31 NOTE — TELEPHONE ENCOUNTER
Refill Approved    Rx renewed per Medication Renewal Policy. Medication was last renewed on 6/8/19.    Luci Khan, [facility] Triage/Med Refill 8/26/2019     Requested Prescriptions   Pending Prescriptions Disp Refills     lisinopril (PRINIVIL,ZESTRIL) 20 MG tablet [Pharmacy Med Name: LISINOPRIL TAB 20MG] 90 tablet 0     Sig: TAKE 1 TABLET DAILY       Ace Inhibitors Refill Protocol Passed - 8/26/2019  7:36 AM        Passed - PCP or prescribing provider visit in past 12 months       Last office visit with prescriber/PCP: 9/10/2018 Luan Horner MD OR same dept: 9/10/2018 Luan Horner MD OR same specialty: 9/10/2018 Luan Horner MD  Last physical: 9/17/2018 Last MTM visit: Visit date not found   Next visit within 3 mo: Visit date not found  Next physical within 3 mo: Visit date not found  Prescriber OR PCP: Luan Horner MD  Last diagnosis associated with med order: 1. Hypertension  - lisinopril (PRINIVIL,ZESTRIL) 20 MG tablet [Pharmacy Med Name: LISINOPRIL TAB 20MG]; TAKE 1 TABLET DAILY  Dispense: 90 tablet; Refill: 0    If protocol passes may refill for 12 months if within 3 months of last provider visit (or a total of 15 months).             Passed - Serum Potassium in past 12 months     Lab Results   Component Value Date    Potassium 4.5 09/17/2018             Passed - Blood pressure filed in past 12 months     BP Readings from Last 1 Encounters:   02/05/19 (!) 163/98             Passed - Serum Creatinine in past 12 months     Creatinine   Date Value Ref Range Status   09/17/2018 0.78 0.70 - 1.30 mg/dL Final

## 2021-05-31 NOTE — TELEPHONE ENCOUNTER
Refill Approved    Rx renewed per Medication Renewal Policy. Medication was last renewed on 6/18/2018 with 3 refills. .  Last office visit :9/17/2018 with PCP Dr JES Horner  Message sent to pharmacy: due for office visit in September     Kelly Burch, [facility] Triage/Med Refill 8/28/2019     Requested Prescriptions   Pending Prescriptions Disp Refills     atorvastatin (LIPITOR) 40 MG tablet 90 tablet 3     Sig: TAKE 1 TABLET DAILY.       Statins Refill Protocol (Hmg CoA Reductase Inhibitors) Passed - 8/28/2019  2:22 PM        Passed - PCP or prescribing provider visit in past 12 months      Last office visit with prescriber/PCP: 9/10/2018 Luan Horner MD OR same dept: 9/10/2018 Luan Horner MD OR same specialty: 9/10/2018 Luan Horner MD  Last physical: 9/17/2018 Last MTM visit: Visit date not found   Next visit within 3 mo: Visit date not found  Next physical within 3 mo: Visit date not found  Prescriber OR PCP: Luan Horner MD  Last diagnosis associated with med order: 1. Hyperlipidemia, unspecified hyperlipidemia type  - atorvastatin (LIPITOR) 40 MG tablet; TAKE 1 TABLET DAILY.  Dispense: 90 tablet; Refill: 3    If protocol passes may refill for 12 months if within 3 months of last provider visit (or a total of 15 months).

## 2021-05-31 NOTE — TELEPHONE ENCOUNTER
Refill Approved    Rx renewed per Medication Renewal Policy. Medication was last renewed on 12/11/2018 with 2 refills.  Last office visit: 9/17/2018 with PCP Dr JES Horner  Message sent to pharmacy: due for office visit in September    Kelly Burch, [facility] Triage/Med Refill 8/28/2019     Requested Prescriptions   Pending Prescriptions Disp Refills     chlorthalidone (HYGROTEN) 25 MG tablet 45 tablet 2     Sig: Take 0.5 tablets (12.5 mg total) by mouth daily.       Diuretics/Combination Diuretics Refill Protocol  Passed - 8/28/2019  2:23 PM        Passed - Visit with PCP or prescribing provider visit in past 12 months     Last office visit with prescriber/PCP: 9/10/2018 Luan Horner MD OR same dept: 9/10/2018 Luan Horner MD OR same specialty: 9/10/2018 Luan Horner MD  Last physical: 9/17/2018 Last MTM visit: Visit date not found   Next visit within 3 mo: Visit date not found  Next physical within 3 mo: Visit date not found  Prescriber OR PCP: Luan Horner MD  Last diagnosis associated with med order: 1. Hypertension  - chlorthalidone (HYGROTEN) 25 MG tablet; Take 0.5 tablets (12.5 mg total) by mouth daily.  Dispense: 45 tablet; Refill: 2    If protocol passes may refill for 12 months if within 3 months of last provider visit (or a total of 15 months).             Passed - Serum Potassium in past 12 months      Lab Results   Component Value Date    Potassium 4.5 09/17/2018             Passed - Serum Sodium in past 12 months      Lab Results   Component Value Date    Sodium 137 09/17/2018             Passed - Blood pressure on file in past 12 months     BP Readings from Last 1 Encounters:   02/05/19 (!) 163/98             Passed - Serum Creatinine in past 12 months      Creatinine   Date Value Ref Range Status   09/17/2018 0.78 0.70 - 1.30 mg/dL Final

## 2021-06-01 ENCOUNTER — RECORDS - HEALTHEAST (OUTPATIENT)
Dept: ADMINISTRATIVE | Facility: CLINIC | Age: 66
End: 2021-06-01

## 2021-06-01 ENCOUNTER — TELEPHONE (OUTPATIENT)
Dept: FAMILY MEDICINE | Facility: CLINIC | Age: 66
End: 2021-06-01

## 2021-06-01 VITALS — WEIGHT: 228.5 LBS | BODY MASS INDEX: 31.43 KG/M2

## 2021-06-01 NOTE — TELEPHONE ENCOUNTER
"According to recommendations from Up-to-date:    \"Dental work -- The risk of IE is generally considered to be the highest for dental procedures that involve manipulation of gingival tissue or the periapical region of the teeth or perforation of the oral mucosa, such as tooth extractions or drainage of a dental abscess; this includes routine dental cleaning [1,37,38].  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 If it is determined or verified from his dentist that there will be manipulation of the gums, or general dental cleaning, he will need antibiotic prophylaxis.  "

## 2021-06-01 NOTE — TELEPHONE ENCOUNTER
Letter created and sent to patient on [employer].  Janie Nielsen CMA ............... 3:55 PM, 09/20/19

## 2021-06-01 NOTE — TELEPHONE ENCOUNTER
Who is requesting the letter?  Patient   Why is the letter needed? I need a letter for my employer stating I was seen in clinic yesterday.   How would you like this letter returned? Release in [employer].   Okay to leave a detailed message? Yes

## 2021-06-01 NOTE — TELEPHONE ENCOUNTER
Patient reports he's having bridgework done, no root canals, etc.  He states [facility] previously always prescribed, but now he has a new dentist, and they won't do it.  No history of heart surgery, just the hip replacement years ago.  RN discussed that there may be new recommendations for prophylactic dental treatment now, so will forward to provider for review.  Patient has historically taken Amoxicillin 2000 mg an hour before the dentist for anything, including cleanings.  He prefers [facility].       Salome Weber RN  [facility]

## 2021-06-01 NOTE — TELEPHONE ENCOUNTER
Refill Approved    Rx renewed per Medication Renewal Policy. Medication was last renewed on 8/28/19.3/9/19    Luci Khan, [facility] Triage/Med Refill 9/30/2019     Requested Prescriptions   Pending Prescriptions Disp Refills     lisinopril (PRINIVIL,ZESTRIL) 20 MG tablet [Pharmacy Med Name: LISINOPRIL TAB 20MG] 90 tablet 0     Sig: TAKE 1 TABLET DAILY       Ace Inhibitors Refill Protocol Passed - 9/30/2019  2:06 PM        Passed - PCP or prescribing provider visit in past 12 months       Last office visit with prescriber/PCP: 9/10/2018 Luan Horner MD OR same dept: Visit date not found OR same specialty: 9/10/2018 Luan Horner MD  Last physical: 9/19/2019 Last MTM visit: Visit date not found   Next visit within 3 mo: Visit date not found  Next physical within 3 mo: Visit date not found  Prescriber OR PCP: Luan Horner MD  Last diagnosis associated with med order: 1. Hypertension  - lisinopril (PRINIVIL,ZESTRIL) 20 MG tablet [Pharmacy Med Name: LISINOPRIL TAB 20MG]; TAKE 1 TABLET DAILY  Dispense: 90 tablet; Refill: 0    2. Hyperlipidemia, unspecified hyperlipidemia type  - atorvastatin (LIPITOR) 40 MG tablet [Pharmacy Med Name: ATORVASTATIN TAB 40MG]; FOR DIRECTIONS ON HOW TO   TAKE THIS MEDICATION, READ THE [email or web address] MAIL SERVICE  INVOICE/RECEIPT  Dispense: 90 tablet; Refill: 0    If protocol passes may refill for 12 months if within 3 months of last provider visit (or a total of 15 months).             Passed - Serum Potassium in past 12 months     Lab Results   Component Value Date    Potassium 4.7 09/19/2019             Passed - Blood pressure filed in past 12 months     BP Readings from Last 1 Encounters:   09/19/19 130/76             Passed - Serum Creatinine in past 12 months     Creatinine   Date Value Ref Range Status   09/19/2019 0.82 0.70 - 1.30 mg/dL Final             atorvastatin (LIPITOR) 40 MG tablet [Pharmacy Med Name: ATORVASTATIN TAB 40MG] 90 tablet 0     Sig:  FOR DIRECTIONS ON HOW TO   TAKE THIS MEDICATION, READ THE ENCLOSED MAIL SERVICE  INVOICE/RECEIPT       Statins Refill Protocol (Hmg CoA Reductase Inhibitors) Passed - 9/30/2019  2:06 PM        Passed - PCP or prescribing provider visit in past 12 months      Last office visit with prescriber/PCP: 9/10/2018 Luan Horner MD OR same dept: Visit date not found OR same specialty: 9/10/2018 Luan Horner MD  Last physical: 9/19/2019 Last MTM visit: Visit date not found   Next visit within 3 mo: Visit date not found  Next physical within 3 mo: Visit date not found  Prescriber OR PCP: Luan Horner MD  Last diagnosis associated with med order: 1. Hypertension  - lisinopril (PRINIVIL,ZESTRIL) 20 MG tablet [Pharmacy Med Name: LISINOPRIL TAB 20MG]; TAKE 1 TABLET DAILY  Dispense: 90 tablet; Refill: 0    2. Hyperlipidemia, unspecified hyperlipidemia type  - atorvastatin (LIPITOR) 40 MG tablet [Pharmacy Med Name: ATORVASTATIN TAB 40MG]; FOR DIRECTIONS ON HOW TO   TAKE THIS MEDICATION, READ THE ENCLOSED MAIL SERVICE  INVOICE/RECEIPT  Dispense: 90 tablet; Refill: 0    If protocol passes may refill for 12 months if within 3 months of last provider visit (or a total of 15 months).

## 2021-06-02 VITALS — BODY MASS INDEX: 33.82 KG/M2 | WEIGHT: 244.19 LBS

## 2021-06-02 VITALS — BODY MASS INDEX: 33.18 KG/M2 | WEIGHT: 237 LBS | HEIGHT: 71 IN

## 2021-06-02 VITALS — WEIGHT: 236 LBS | BODY MASS INDEX: 32.46 KG/M2

## 2021-06-02 NOTE — TELEPHONE ENCOUNTER
Patient request this information be my charted so he can print it and discuss with his dentist, he will call back.    EVELYN Henderson

## 2021-06-02 NOTE — TELEPHONE ENCOUNTER
From UTD-    Patients at highest risk -- Prophylaxis is suggested only in settings associated with the highest risk of an adverse outcome if IE occurs [1,5]. This includes patients with:  ?Prosthetic heart valves, including mechanical, bioprosthetic, and homograft valves (transcatheter-implanted as well as surgically implanted valves are included)  ?Prosthetic material used for cardiac valve repair, such as annuloplasty rings and chords  ?A prior history of IE  ?Unrepaired cyanotic congenital heart disease  ?Repaired congenital heart disease with residual shunts or valvular regurgitation at the site or adjacent to the site of the prosthetic patch or prosthetic device  ?Repaired congenital heart defects with catheter-based intervention involving an occlusion device or stent during the first six months after the procedure  ?Valve regurgitation due to a structurally abnormal valve in a transplanted heart       Left message for the patient to call the clinic.  Tonia DIETRICH RN

## 2021-06-03 VITALS
BODY MASS INDEX: 31.71 KG/M2 | WEIGHT: 234.1 LBS | DIASTOLIC BLOOD PRESSURE: 76 MMHG | SYSTOLIC BLOOD PRESSURE: 130 MMHG | HEIGHT: 72 IN | HEART RATE: 72 BPM

## 2021-06-04 NOTE — TELEPHONE ENCOUNTER
Duplicate request- medication filled at [facility] 12/24/2019    valACYclovir (VALTREX) 500 MG tablet 90 tablet 3 12/24/2019     Sig - Route: Take 1 tablet (500 mg total) by mouth daily. - Oral    Sent to pharmacy as: valACYclovir 500 mg tablet (Valtrex)    E-Prescribing Status: Receipt confirmed by pharmacy (12/24/2019  7:12 AM CST)    Associated Diagnoses     Genital herpes simplex, unspecified site       Pharmacy     [facility] - [place] - [address] AT [address] TO [facility]      Disp Refills Start End    chlorthalidone (HYGROTEN) 25 MG tablet 45 tablet 3 12/24/2019     Sig - Route: Take 0.5 tablets (12.5 mg total) by mouth daily. - Oral    Sent to pharmacy as: chlorthalidone 25 mg tablet (HYGROTEN)    E-Prescribing Status: Receipt confirmed by pharmacy (12/24/2019  7:12 AM CST)    Associated Diagnoses     Hypertension       Pharmacy     [facility] - [place] [address] AT [address]AL TO [facility]

## 2021-06-04 NOTE — TELEPHONE ENCOUNTER
Medication Question or Clarification  Who is calling: Pharmacy: [facility]  What medication are you calling about? (include dose and sig)    Disp Refills Start End    atorvastatin (LIPITOR) 40 MG tablet 90 tablet 3 9/30/2019     Sig: FOR DIRECTIONS ON HOW TO   TAKE THIS MEDICATION, READ THE [employer] MAIL SERVICE  INVOICE/RECEIPT    Sent to pharmacy as: atorvastatin 40 mg tablet (LIPITOR)    E-Prescribing Status: Receipt confirmed by pharmacy (9/30/2019  5:40 PM CDT)        Who prescribed the medication?: Luan Horner MD    What is your question/concern?: Please clarify directions.  Pharmacy: [facility]  [phone]  Reference # [id number]  Okay to leave a detailed message?: Yes  Site CMT - Please call the pharmacy to obtain any additional needed information.

## 2021-06-04 NOTE — TELEPHONE ENCOUNTER
Medication Request  Medication name:   gabapentin (NEURONTIN) 600 MG tablet  2 9/11/2019     Sig: TK 1 T PO HS    Class: Historical Med      Patient states he is taking 2 tablets daily.    Pharmacy Name and Location: [facility]    Reason for request: States his Orthopedic retired and now he needs the provider to fill for him.    Okay to leave a detailed message: yes

## 2021-06-04 NOTE — TELEPHONE ENCOUNTER
Who is calling:  Patient   Reason for Call:  Caller stated that he still doesn't see the letter that he is requesting and was wondering if Luan Horner MD nurse had already release it.   ( Writer did transfer patient to [facility] department to advise. )   Date of last appointment with primary care: n/a  Okay to leave a detailed message: Yes

## 2021-06-04 NOTE — PROGRESS NOTES
Clinic Note    Assessment:     Assessment and Plan:  1. Skin lesion  I believe his lesion is consistent with actinic keratosis.  Discussion about the benign nature of this lesion.  He would like a full body exam at dermatology.  - Ambulatory referral to Dermatology     Patient Instructions   I think that your skin lesion is consistent with something called actinic keratosis.  This is a benign noncancerous lesion.    Referral to dermatology is in.  My dermatology: [phone]; [facility] [phone]    Chlorthalidone and valacyclovir prescription was refilled.    Return in about 6 months (around 2020).         Subjective:      Jules Cheng is a 64 y.o. male who comes the clinic today with a skin lesion on his chest.    He first noticed the skin lesion a couple of weeks ago.  It is a bit itchy.  He does see dermatology on a regular basis.  He has not noticed any bleeding or oozing.  No fevers.    The following portions of the patient's history were reviewed and updated as appropriate: Allergies, medications, problems, prior note.    Review of Systems:    Review is otherwise negative except for what is mentioned above.     Social Hx:    Social History     Tobacco Use   Smoking Status Former Smoker     Last attempt to quit:      Years since quittin.9   Smokeless Tobacco Never Used         Objective:     Vitals:    19 1609   BP: 132/80   Pulse: 71       Exam:    General: No apparent distress. Calm. Alert and Oriented X3. Pt behavior is appropriate.  Skin: Small 0.25 crusty pale-colored scab on patient's right anterior chest, proximal to the clavicle with no surrounding fluctuance erythema or streaking.      Patient Active Problem List   Diagnosis     Benign Adenomatous Polyp Of The Large Intestine     Hyperlipemia     Hypertension     Herpes Simplex Type II     Coronary artery calcification seen on CAT scan 131 (2014)     Diabetes mellitus, type 2 (H)     Right thyroid nodule      Current Outpatient Medications   Medication Sig Dispense Refill     aspirin 81 MG EC tablet Take 81 mg by mouth daily.       atorvastatin (LIPITOR) 40 MG tablet FOR DIRECTIONS ON HOW TO   TAKE THIS MEDICATION, READ THE [employer] MAIL SERVICE  INVOICE/RECEIPT 90 tablet 3     chlorthalidone (HYGROTEN) 25 MG tablet Take 0.5 tablets (12.5 mg total) by mouth daily. 45 tablet 3     cholecalciferol, vitamin D3, (VITAMIN D3) 1,000 unit capsule Take 1,000 Units by mouth daily.       gabapentin (NEURONTIN) 600 MG tablet TK 1 T PO HS  2     lisinopril (PRINIVIL,ZESTRIL) 20 MG tablet Take 1 tablet (20 mg total) by mouth daily. 90 tablet 0     lisinopril (PRINIVIL,ZESTRIL) 20 MG tablet TAKE 1 TABLET DAILY 90 tablet 3     valACYclovir (VALTREX) 500 MG tablet Take 1 tablet (500 mg total) by mouth daily. 90 tablet 3     valACYclovir (VALTREX) 1000 MG tablet Take 0.5 tablets (500 mg total) by mouth daily. 45 tablet 3     No current facility-administered medications for this visit.          Luis Servin (Rob), KARLA    12/23/2019

## 2021-06-04 NOTE — PATIENT INSTRUCTIONS - HE
I think that your skin lesion is consistent with something called actinic keratosis.  This is a benign noncancerous lesion.    Referral to dermatology is in.  My dermatology: [phone]; [facility] [phone]    Chlorthalidone and valacyclovir prescription was refilled.

## 2021-06-05 NOTE — TELEPHONE ENCOUNTER
Medication Question or Clarification  Who is calling: [email or web address]  What medication are you calling about (include dose and sig)?:   valACYclovir (VALTREX) 500 MG tablet  500 mg, Oral, DAILY         Summary: Take 1 tablet (500 mg total) by mouth daily., Starting Tue 12/24/2019, Normal   Dose, Frequency: 500 mg, DAILY  Start: 12/24/2019  Ord/Sold: 12/24/2019 (O)        Who prescribed the medication?: Luis Servin CNP  What is your question/concern?: The pharmacy is asking if the provider would consider ordering acyclovir 400 mg tablet two times a day for immune suppression on genital herpes and stop valacyclovir? The acyclovir is the preferred medication.  Please order 90 day (180 tabs) supply with refills if appropriate.   Please advise.    [facility]ed [facility]  Okay to leave a detailed message?: Yes

## 2021-06-09 NOTE — PROGRESS NOTES
ASSESSMENT:  1. Routine general medical examination at a health care facility  Appears to be all up-to-date    2. Hyperglycemia  His diabetes has resolved and is now metabolic syndrome by definition.  He will continue with an appropriate diet and occasionally checking his sugars.  Follow-up in 5 months to repeat A1c and make sure he is staying in the nondiabetic range.  - Glycosylated Hemoglobin A1c  - Microalbumin, Random Urine  - JIC RED    3. Essential hypertension  Well-controlled on current medication will continue same with his weight loss.  - Comprehensive Metabolic Panel  - HM2(CBC w/o Differential)  - Urinalysis    4. Coronary artery calcification seen on CAT scan  Is on appropriate statin and will check lipids at this time and liver enzymes.  - Comprehensive Metabolic Panel  - Lipid Cascade    5. Special screening for malignant neoplasm of prostate    - PSA (Prostatic-Specific Antigen), Annual Screen    6. Right thyroid nodule  There is some right thyroid lobe fullness compared to the left side which I have not commented about before.  For that reason we discussed doing a thyroid ultrasound and TSH.  I will get back to him about the results  - US Thyroid; Future  - Thyroid Stimulating Hormone (TSH)    7. Tinnitus  If this progresses or becomes more one-sided he should see ENT or audiology or both.    PLAN:  Patient Instructions   My recommendation is you have the shingles vaccination completed somewhere.    If you notice that the ringing in your ears becomes worse over the next couple of months, see ENT- #[phone]    Thyroid ultrasound- #[phone]    Check your blood sugars a couple of times per month and at various times per day.       Orders Placed This Encounter   Procedures     Influenza, Seasonal,Quad Inj, 36+ MOS     Glycosylated Hemoglobin A1c     Microalbumin, Random Urine     JIC RED     Comprehensive Metabolic Panel     HM2(CBC w/o Differential)     Lipid Cascade     Order Specific  Question:   Fasting is required?     Answer:   No     Urinalysis     PSA (Prostatic-Specific Antigen), Annual Screen     Medications Discontinued During This Encounter   Medication Reason     PSEUDOEPHEDRINE HCL (SUDAFED ORAL) Therapy completed       Return in about 5 months (around 8/30/2017) for hyperglycemia, hypertension.    ASSESSED PROBLEMS:  Problem List Items Addressed This Visit     Hypertension    Relevant Orders    Comprehensive Metabolic Panel    HM2(CBC w/o Differential)    Urinalysis    Coronary artery calcification seen on CAT scan 131 (09/2014)    Relevant Orders    Comprehensive Metabolic Panel    Lipid Cascade    Hyperglycemia    Relevant Orders    Glycosylated Hemoglobin A1c (Completed)    Microalbumin, Random Urine    JIC RED      Other Visit Diagnoses     Routine general medical examination at a health care facility    -  Primary    Special screening for malignant neoplasm of prostate        Relevant Orders    PSA (Prostatic-Specific Antigen), Annual Screen    Right thyroid nodule        Tinnitus              CHIEF COMPLAINT:  Chief Complaint   Patient presents with     Annual Exam     Tinnitus       HISTORY OF PRESENT ILLNESS:  Jules Cheng is a 61 y.o. male presenting to the clinic today for an annual physical exam.    Health maintenance: He had a colonoscopy completed in 9/5/13 and it was normal; he is on a 5-year screening plan. He is due for the Zoster vaccination, otherwise all of his immunizations are up to date at this time. He has an eye exam completed annually; he does not have glaucoma but it was borderline at one point.     REVIEW OF SYSTEMS:   He has noticed tinnitus for the last couple of months. He has noticed that he needs to lean in closer when he is having conversations. He is still on Weight [employer]. He had a hemoglobin A1c of 6.0 today. He checks his blood sugars every morning and they are rarely over 130.   See completed form B. Comprehensive review of systems negative  "except as noted above.    PFSH:  History reviewed. No pertinent past medical history.  Past Surgical History:   Procedure Laterality Date     TOTAL HIP ARTHROPLASTY Left 05/17/2012     TOTAL HIP ARTHROPLASTY Right      Family History   Problem Relation Age of Onset     Diabetes Paternal Aunt      Arthritis Maternal Aunt      Rheumatoid     Social History     Social History     Marital status:      Spouse name: N/A     Number of children: N/A     Years of education: N/A     Occupational History     Not on file.     Social History Main Topics     Smoking status: Former Smoker     Smokeless tobacco: Not on file     Alcohol use No     Drug use: No     Sexual activity: Yes     Partners: Female     Other Topics Concern     Not on file     Social History Narrative       VITALS:  Vitals:    03/30/17 1255   BP: 122/64   Pulse: 60   Weight: (!) 231 lb 9.6 oz (105.1 kg)   Height: 5' 11.5\" (1.816 m)     Wt Readings from Last 3 Encounters:   03/30/17 (!) 231 lb 9.6 oz (105.1 kg)   07/31/16 218 lb 12.8 oz (99.2 kg)   06/27/16 (!) 223 lb 4.8 oz (101.3 kg)     Body mass index is 31.86 kg/(m^2).  The following high BMI interventions were performed this visit: weight monitoring, dietary management education, guidance, and counseling and lifestyle education regarding diet    PHYSICAL EXAM:  General Appearance: Alert, cooperative, no distress, appears stated age.  HEENT: EMOI, fundi not observed, TMs normal, mouth and throat without lesions. Hearing loss in right ear.   Neck: Supple without adenopathy. Feels like the right lobe is slightly enlarged, no specific nodule is felt.   Back: No CVA tenderness or spinous process pain.  Lungs: Clear to auscultation bilaterally, good air movement.  Heart: Regular rate and rhythm, S1 and S2 normal, no murmur or bruit.  Abdomen: Soft, non-tender, no HSM or masses.  Genitourinary: Normal male, testes descended without masses or hernias.   Rectal exam:  Normal size and consistency for age " without nodules.  Musculoskeletal: No gross abnormalities.  Extremities: No CCE, pulses II/IV and symmetric.  Skin: No worrisome lesions noted. Seborrheic keratosis on left clavicle. Scars from previous total hip replacement surgeries.   Lymph nodes: Cervical, supraclavicular, groin, and axillary nodes normal.  Neurologic: CNII-XII intact, strength V/V and symmetric, DTRs I/IV and symmetric in LE, DTRs II/IV and symmetric in UE, sensory grossly intact.  Psychiatric:  He has a normal mood and affect.     ADDITIONAL HISTORY SUMMARIZED (FROM OLD RECORDS OR HISTORY FROM SOMEONE OTHER THAN THE PATIENT OR ANOTHER HEALTHCARE PROVIDER) (2 TOTAL): None.    DECISION TO OBTAIN EXTRA INFORMATION (OLD RECORDS REQUESTED OR HISTORY FROM ANOTHER PERSON OR ACCESSING CARE EVERYWHERE) (1 TOTAL): None.     RADIOLOGY TESTS SUMMARIZED OR ORDERED (XRAY/CT/MRI/DXA) (1 TOTAL): Ordered thyroid ultrasound today.     LABS REVIEWED OR ORDERED (1 TOTAL): Ordered A1c and microalbumin labs today. Ordered CMP, HM2, UA, lipid, and PSA labs today.     MEDICINE TESTS SUMMARIZED OR ORDERED (EKG/ECHO/COLONOSCOPY/EGD) (1 TOTAL): None.    INDEPENDENT REVIEW OF EKG OR X-RAY (2 EACH): None.     The visit lasted a total of 17 minutes face to face with the patient. Over 50% of the time was spent counseling and educating the patient about health maintenance.    ICheryl, am scribing for and in the presence of, Dr. Bhatt.    IDr. Bhatt, personally performed the services described in this documentation, as scribed by Cheryl Rothman in my presence, and it is both accurate and complete.    MEDICATIONS:  Current Outpatient Prescriptions   Medication Sig Dispense Refill     aspirin 81 MG EC tablet Take 81 mg by mouth daily.       atorvastatin (LIPITOR) 40 MG tablet TAKE 1 TABLET DAILY 90 tablet 0     blood glucose test (ONETOUCH VERIO) strips Test once each day as directed for 3 months supply. 90 strip 0     cholecalciferol, vitamin D3, (VITAMIN  D3) 1,000 unit capsule Take 1,000 Units by mouth daily.       fluticasone (FLONASE) 50 mcg/actuation nasal spray 2 sprays into each nostril daily. 16 g 1     lancets (ONETOUCH DELICA LANCETS) 30 gauge Misc Use each day as directed for three month's supply. 200 each 0     lisinopril (PRINIVIL,ZESTRIL) 20 MG tablet TAKE 1 TABLET DAILY 90 tablet 0     chlorthalidone (HYGROTEN) 25 MG tablet Take 0.5 tablets (12.5 mg total) by mouth daily. 45 tablet 3     valACYclovir (VALTREX) 500 MG tablet TAKE 1 TABLET EVERY DAY. 90 tablet 1     No current facility-administered medications for this visit.        Total data points: 2

## 2021-06-11 NOTE — TELEPHONE ENCOUNTER
Valtrex does not come in 400 mg tablets. Acyclovir does. Order pended to be sent to [facility].  Janie Nielsen CMA ............... 12:11 PM, 09/17/20

## 2021-06-11 NOTE — TELEPHONE ENCOUNTER
Medication Question or Clarification    Who is calling:   [facility]    What medication are you calling about (include dose and sig)?:   valACYclovir (VALTREX) 500 MG tablet  90 tablet  3  12/24/2019      Sig - Route: Take 1 tablet (500 mg total) by mouth daily        Who prescribed the medication?:   PCP    What is your question/concern?:   Pharmacy needs a new script to dispense:  ValACYclovir (VALTREX) 400 MG tablet  1 two times a day.  For insurance recommendations and benefit coverage.    Requested Pharmacy: [facility] - [place], [place] - [address] AT PORTAL TO [facility]ED [facility] SITES      Okay to leave a detailed message?: Yes    Please send new script to patients pharmacy if appropriate and notify the patient to the outcome of this request.

## 2021-06-11 NOTE — TELEPHONE ENCOUNTER
Acyclovir is the preferred for insurance. Called into [facility] per Dr. Horner. Med list updated.

## 2021-06-11 NOTE — TELEPHONE ENCOUNTER
Not appropriate.  When used for supressive treatment of genital HSV, the dose of acyclovir is 400 twice daily.

## 2021-06-11 NOTE — TELEPHONE ENCOUNTER
Medication Question or Clarification    Who is calling:   Patient    What medication are you calling about (include dose and sig)?:   acyclovir (ZOVIRAX) 400 MG tablet         Sig - Route: Take 400 mg by mouth 2 (two) times a day.        Who prescribed the medication?:   PCP    What is your question/concern?:   Pharmacy called requesting a formulary change for better tier coverage at once daily therapy, however PCP order as two times a day.    Patient want's to confirm dosing and if PCP is ok with him changing this med as requested by his insurance.    Requested Pharmacy: [facility] - [place], [place] - [address] AT PORTAL TO REGISTERED [facility] SITES     Okay to leave a detailed message?: Yes    Please call to clarify dosing instruction and if it's  ok to proceed with med change.

## 2021-06-12 NOTE — PROGRESS NOTES
Clinic Note    Assessment:     Assessment and Plan:  1. Essential hypertension  Well-controlled on current medications and continue same and continue trying to lose weight.    2. Elevated glucose  A1c was 6.1 is doing great.  He had troubles on metformin previously.  I discussed this and that is the only medicine that I am aware of that is likely to be beneficial in a gentleman who has elevated glucose instead of diabetes.  If and when his A1c rises close to 7 I would consider a sulfonylurea  TZD or other  - Glycosylated Hemoglobin A1c    3. Coronary artery calcification seen on CAT scan 131 (09/2014)  On appropriate dose of statin at this time and aspirin continue same    4. Thyroid nodule  I cannot feel any nodule fullness at this time whatsoever.  However the suggestion was that we repeat the ultrasound at 6 months and that would be now.  - US Thyroid; Future     There are no Patient Instructions on file for this visit.  No Follow-up on file.         Subjective:      Jules Cheng is a 61 y.o. male comes in for follow-up of his multiple medical problems as listed in the assessment and plan above.  He has been feeling well without any problems.  When his first 5K without walking.  He is still on [employer].  He is working together with this with his wife.  These are social history issues as well.  No chest pain pressure or tightness the chest shortness of breath etc.  No other symptoms or problems.  No foot problems or neuropathy.    The following portions of the patient's history were reviewed and updated as appropriate: Past medical history, allergies, medicines, recent labs, weights over the last 2 years showing some gain.  We discussed goal of around 200.    Review of Systems:    As mentioned no other symptoms  History   Smoking Status     Former Smoker   Smokeless Tobacco     Not on file         Objective:     Vitals:    09/05/17 1354   BP: 122/64   Pulse: 64   Weight: (!) 228 lb (103.4 kg)        Exam:  Vital signs are stable looks great.  Patient looks well no acute distress  CV-RRR S1-S2 normal  Lungs-clear  Abdomen-benign  Extremities-no edema and diabetic foot exam normal  Neck- I do not feel any thyroid nodule or fullness at all at this time.    Patient Active Problem List   Diagnosis     Benign Adenomatous Polyp Of The Large Intestine     Hyperlipemia     Hypertension     Herpes Simplex Type II     Coronary artery calcification seen on CAT scan 131 (09/2014)     Elevated glucose     Current Outpatient Prescriptions   Medication Sig Dispense Refill     amoxicillin (AMOXIL) 500 MG capsule TK 4 CS PO 1 HOUR PRIOR TO DENTAL APPOINTMENT  2     aspirin 81 MG EC tablet Take 81 mg by mouth daily.       atorvastatin (LIPITOR) 40 MG tablet TAKE 1 TABLET DAILY. 90 tablet 3     blood glucose test (ONETOUCH VERIO) strips Test once each day as directed for 3 months supply. 90 strip 0     chlorthalidone (HYGROTEN) 25 MG tablet Take 0.5 tablets (12.5 mg total) by mouth daily. 45 tablet 3     cholecalciferol, vitamin D3, (VITAMIN D3) 1,000 unit capsule Take 1,000 Units by mouth daily.       lancets (ONETOUCH DELICA LANCETS) 30 gauge Misc Use each day as directed for three month's supply. 200 each 0     lisinopril (PRINIVIL,ZESTRIL) 20 MG tablet Take 1 tablet (20 mg total) by mouth daily. 90 tablet 3     valACYclovir (VALTREX) 500 MG tablet TAKE 1 TABLET EVERY DAY. 90 tablet 3     No current facility-administered medications for this visit.          Gene Bhatt    9/5/2017

## 2021-06-12 NOTE — TELEPHONE ENCOUNTER
Patient notified that Dr. Horner recommends 400 mg two times a day. Patient verbalizes understanding and explained that he was able to get prescription cheaper if he was able to go down to one a day but it was no big deal. Patient will continue two times a day dosing.    Patient also wants to report that he has moved far away and will be finding a new doctor closer to home. Routing to Dr. Horner.

## 2021-06-14 NOTE — TELEPHONE ENCOUNTER
RN cannot approve Refill Request    RN can NOT refill this medication PCP messaged that patient is overdue for Labs, Office Visit and BP. Last office visit: 12/23/2019 Luis Servin CNP Last Physical: Visit date not found Last MTM visit: Visit date not found Last visit same specialty: 12/23/2019 Luis Servin CNP.  Next visit within 3 mo: Visit date not found  Next physical within 3 mo: Visit date not found      Graciela Ambrose, [facility] Triage/Med Refill 2/2/2021    Requested Prescriptions   Pending Prescriptions Disp Refills     chlorthalidone (HYGROTEN) 25 MG tablet [Pharmacy Med Name: CHLORTHALID  TAB 25MG] 45 tablet 3     Sig: TAKE 1/2 TABLET (12.5MG)   DAILY       Diuretics/Combination Diuretics Refill Protocol  Failed - 2/2/2021  1:46 PM        Failed - Visit with PCP or prescribing provider visit in past 12 months     Last office visit with prescriber/PCP: 12/23/2019 Luis Servin CNP OR same dept: Visit date not found OR same specialty: 12/23/2019 Luis Servin CNP  Last physical: Visit date not found Last MTM visit: Visit date not found   Next visit within 3 mo: Visit date not found  Next physical within 3 mo: Visit date not found  Prescriber OR PCP: Luis Servin CNP  Last diagnosis associated with med order: 1. Hypertension  - chlorthalidone (HYGROTEN) 25 MG tablet [Pharmacy Med Name: CHLORTHALID  TAB 25MG]; TAKE 1/2 TABLET (12.5MG)   DAILY  Dispense: 45 tablet; Refill: 3    If protocol passes may refill for 12 months if within 3 months of last provider visit (or a total of 15 months).             Failed - Serum Potassium in past 12 months      No results found for: LN-POTASSIUM          Failed - Serum Sodium in past 12 months      No results found for: LN-SODIUM          Failed - Blood pressure on file in past 12 months     BP Readings from Last 1 Encounters:   12/23/19 132/80             Failed - Serum Creatinine in past 12 months      Creatinine   Date Value Ref Range Status    09/19/2019 0.82 0.70 - 1.30 mg/dL Final

## 2021-06-15 NOTE — TELEPHONE ENCOUNTER
Spoke to patient. Transferred care to Dr. Granados. Patient is going to contact there office for refill.

## 2021-06-15 NOTE — PROGRESS NOTES
Hearing evaluation in conjunction with ENT exam (Dr. Meneses)    History:  Bilateral tinnitus and decreased hearing for approximately one year. Denied significant noise exposure, recent illness, otalgia, aural fullness, or dizziness. Did endorse frequent headaches and sinus issues.    Results:  Otoscopy was unremarkable in either ear. Hearing sensitivity was assessed with good reliability using insert phones.      Right ear:   Normal hearing sensitivity for 250-2000 Hz, steeply sloping to moderate-severe sensorineural hearing loss for 5283-8399 Hz.    Left ear:  Normal hearing sensitivity for 250-1500 Hz, sloping to mild to moderate-severe sensorineural hearing loss for 0950-6891 Hz.  Hearing asymmetry was noted between ears; Latia was negative at 6000 Hz.      Speech reception thresholds showed agreement with pure tone findings in each ear. Word recognition ability was excellent, bilaterally, with presentation levels at or somewhat above typical conversational volume in both ears.    Tympanometry was consistent with normal middle ear function, bilaterally.    Recommendations:  Follow-up with ENT; retest hearing annually (to monitor) or per medical management.  Wear hearing protection consistently in noise.  Jules Cheng is a potential binaural amplification candidate for both hearing loss and tinnitus management, if patient motivation exists and medical clearance is granted. Common tinnitus triggers and management strategies were discussed at length.    Libia Owens., [facility]  [place] Licensed Audiologist [id number]

## 2021-06-15 NOTE — PROGRESS NOTES
HISTORY OF PRESENT ILLNESS  Ringing in the ear that has gotten significantly worse over the last year.  Going on constantly about a year ago.  It is hard to understand what others are saying.  Understanding is tough.  No pain, pressure or fullness.  No history of noise exposure.  No family history.    REVIEW OF SYSTEMS  Review of Systems: a 10-system review was performed. Pertinent positives are noted in the HPI and on a separate scanned document in the chart.    PMH, PSH, FH and SH has documented in the EHR.      EXAM    CONSTITUTIONAL  General Appearance:  Normal, well developed, well nourished, no obvious distress  Ability to Communicate:  communicates appropriately.    HEAD AND FACE  Appearance and Symmetry:  Normal, no scalp or facial scarring or suspicious lesions.    EARS  Clinical speech reception threshold:  Normal, able to hear normal speech.  Auricle:  Normal, Auricles without scars, lesions, masses.  External auditory canal:  Normal, External auditory canal normal.  Tympanic membrane:  Normal, Tympanic membranes normal without swelling or erythema.  Tympanic membrane mobility:  Normal, Normal tympanic membrane mobility.    NOSE (speculum or scope)  Architecture:  Normal, Grossly normal external nasal architecture with no masses or lesions.  Mucosa:  Normal mucosa, No polyps or masses.  Septum:  Normal, Septum non-obstructing.  Turbinates:  Normal, No turbinate abnormalities    ORAL CAVITY AND OROPHARYNX  Lips:  Normal.  Dental and gingiva:  Normal, No obvious dental or gingival disease.  Mucosa:  Normal, Moist mucous membranes.  Tongue:  Normal, Tongue mobile with no mucosal abnormalities  Hard and soft palate:  Normal, Hard and soft palate without cleft or mucosal lesions.  Oral pharynx:  Normal, Posterior pharynx without lesions or remarkable asymmetry.  Saliva:  Normal, Clear saliva.  Masses:  Normal, No palpable masses or pathologically enlarged lymph nodes.    NECK  Masses/lymph nodes:  Normal, No  worrisome neck masses or lymph nodes.  Salivary glands:  Normal, Parotid and submandibular glands.  Trachea and larynx position:  Normal, Trachea and larynx midline.  Thyroid:  Normal, No thyroid abnormality.  Tenderness:  Normal, No cervical tenderness.  Suppleness:  Normal, Neck supple    NEUROLOGICAL  Speech pattern:  Normal, Proasaic    RESPIRATORY  Symmetry and Respiratory effort:  Normal, Symmetric chest movement and expansion with no increased intercostal retractions or use of accessory muscles.     Ringing in the ear that has gotten significantly worse over the last year.  Going on constantly about a year ago.  It is hard to understand what they are saying.  Understanding is tough.    IMPRESSION  Patient has a high tone sensorineural hearing loss and tinnitus.      RECOMMENDATION  I discussed findings with him.  I discussed hearing aids and hearing protection.  He is not ready to consider a hearing aid at this time.  He will follow up as needed.    Vijay Meneses MD

## 2021-06-17 NOTE — PATIENT INSTRUCTIONS - HE
Patient Instructions by Luan Horner MD at 9/19/2019  4:20 PM     Author: Luan Horner MD Service: -- Author Type: Physician    Filed: 9/19/2019  5:22 PM Encounter Date: 9/19/2019 Status: Signed    : Luan Horner MD (Physician)         Patient Education   Signs of Hearing Loss  Hearing loss is a problem shared by many people. In fact, it is one of the most common health conditions, particularly as people age. Most people over age 65 have some hearing loss, and by age 80, almost everyone does. Because hearing loss usually occurs slowly over the years, you may not realize your hearing ability has gotten worse.       Have your hearing checked  Contact your [facility] care provider if you:    Have to strain to hear normal conversation.    Have to watch other peoples faces very carefully to follow what theyre saying.    Need to ask people to repeat what theyve said.    Often misunderstand what people are saying.    Turn the volume of the television or radio up so high that others complain.    Feel that people are mumbling when theyre talking to you.    Find that the effort to hear leaves you feeling tired and irritated.    Notice, when using the phone, that you hear better with 1 ear than the other.    [id number] [employer]. [address] [place] [address], [place] [address]. All rights reserved. This information is not intended as a substitute for professional medical care. Always follow your healthcare professional's instructions.           Advance Directive  Patients advance directive was discussed and I am comfortable with the patients wishes.  Patient Education   Personalized Prevention Plan  You are due for the preventive services outlined below.  Your care team is available to assist you in scheduling these services.  If you have already completed any of these items, please share that information with your care team to update in your medical record.  Health Maintenance   Topic  Date Due   ? ZOSTER VACCINES (2 of 2) 03/18/2016   ? COLONOSCOPY  09/05/2018   ? MEDICARE ANNUAL WELLNESS VISIT  09/07/2018   ? PNEUMOCOCCAL POLYSACCHARIDE VACCINE AGE 65 AND OVER  09/07/2018   ? DIABETES HEMOGLOBIN A1C  03/17/2019   ? INFLUENZA VACCINE RULE BASED (1) 08/01/2019   ? DIABETES URINE MICROALBUMIN  09/17/2019   ? FALL RISK ASSESSMENT  09/17/2019   ? DIABETES FOLLOW-UP  03/19/2020   ? DIABETES FOOT EXAM  09/19/2020   ? ADVANCE CARE PLANNING  12/30/2020   ? TD 18+ HE  12/30/2025   ? HEPATITIS C SCREENING  Completed   ? PNEUMOCOCCAL CONJUGATE VACCINE FOR ADULTS (PCV13 OR PREVNAR)  Completed   ? DIABETES OPHTHALMOLOGY EXAM  Discontinued

## 2021-06-18 NOTE — PROGRESS NOTES
1. Hypertension  lisinopril (PRINIVIL,ZESTRIL) 20 MG tablet    chlorthalidone (HYGROTEN) 25 MG tablet    Comprehensive Metabolic Panel   2. Hyperlipidemia, unspecified hyperlipidemia type  atorvastatin (LIPITOR) 40 MG tablet    Lipid Profile   3. Genital herpes simplex, unspecified site  valACYclovir (VALTREX) 1000 MG tablet   4. Elevated glucose  Glycosylated Hemoglobin A1c      Plan: Refilled medications as listed above.  He is fasting today, so we will go ahead and get some fasting blood work today since it has been a bit since we have gotten some.  Will follow-up on the results of the become available.  He does plan on establishing with a provider here in the fall.    Subjective: 62-year-old male who is here today for medication refills and recheck.  Does anticipate setting up a primary care provider in the fall, but could not get an appointment until then, and needs refills done before that.  He has hypertension and is on lisinopril and chlorthalidone.  He reports doing well on these medications and has no problems or side effects.  They seem to be working well for him as well.  He has hyperlipidemia and is on atorvastatin 40 mg once daily for that, he reports no side effects to that either he is on a prophylactic dose of Valtrex for genital herpes, and reports no problems with that medication and it seems to be doing a good job for that.    ROS:  A comprehensive Review of Systems was performed, and other than the positives mentioned above, the ROS was negative.     General: Awake, Alert and Cooperative   Head: Normocephalic and Atraumatic   Eyes: PERRL, EOMI.   ENT: Normal pearly TMs bilaterally and Oropharynx clear   Neck: Supple and Thyroid without enlargement or nodules   Chest: Chest wall normal   Lungs: Clear to auscultation bilaterally   Heart:: Regular rate and rhythm and no murmurs.  No significant LE edema.   Abdomen: Soft, nontender, nondistended and no hepatosplenomegaly   Musculoskeletal: Moving  all extremities and No pain in the extremities   Neuro: Alert and oriented times 3 and Grossly normal   Skin: No rashes or lesions noted

## 2021-06-19 NOTE — LETTER
Letter by Luan Horner MD at      Author: Luan Horner MD Service: -- Author Type: --    Filed:  Encounter Date: 9/4/2019 Status: (Other)         Jules Cheng  [id number]   [facility] [address]          September 4, 2019      Dear Mr. Cheng,    This letter is to inform you that according to our records, you are overdue for a diabetic opthalmology exam. This is an exam recommended yearly to rule out diabetic retinopathy. If you have had this exam done, please contact us with the updated information of when and where your last visit was performed. Otherwise, please schedule this eye exam and remind your eye doctor to fax this information to our clinic so we can update your chart at [phone].        Sincerely,        Electronically signed by Luan Horner MD

## 2021-06-19 NOTE — LETTER
Letter by Luan Horner MD at      Author: Luan Horner MD Service: -- Author Type: --    Filed:  Encounter Date: 8/30/2019 Status: (Other)         Jules Cheng  [address]   [facility] [address]             August 30, 2019         Dear Mr. Cheng,    Our records indicate that you are due for your annual diabetic eye exam.  If you have already completed this exam within this year please help us get a copy of the exam so we can update your records. They can be faxed to [phone]. If you have not completed this yet this year, please call your eye clinic and schedule an appointment.    Please call with questions or contact us using [employer].    Sincerely,        Electronically signed by Luan Horner MD

## 2021-06-19 NOTE — LETTER
Letter by Luan Horner MD at      Author: Luan Horner MD Service: -- Author Type: --    Filed:  Encounter Date: 9/20/2019 Status: (Other)         September 20, 2019     Patient: Jules Cheng   YOB: 1955   Date of Visit: 9/20/2019       To Whom It May Concern:    Jules Cheng was seen by me for an office visit on 9/19/19.    If you have any questions or concerns, please don't hesitate to call.    Sincerely,        Electronically signed by Luan Horner MD

## 2021-06-20 NOTE — LETTER
Letter by Luis Servin CNP at      Author: Luis Servin CNP Service: -- Author Type: --    Filed:  Encounter Date: 12/23/2019 Status: Signed         December 24, 2019     Patient: Jules Cheng   YOB: 1955   Date of Visit: 12/23/2019       To Whom It May Concern:    It is my medical opinion that Jules Cheng had an appointment with me on 12/23/2019.    If you have any questions or concerns, please don't hesitate to call.    Sincerely,        Electronically signed by Luis Servin CNP

## 2021-06-20 NOTE — PROGRESS NOTES
Jules presents today for a physical exam along with establishing care.  A complete review of systems is undertaken was negative unless noted below.    ILLNESSES, HOSPITALIZATIONS, AND OPERATIONS:    #1.  Hyperlipidemia.  2.  Type 2 diabetes.  3.  Hypertension.  4.  History of a right-sided thyroid nodule.  5.  Status post left total hip arthroplasty in .  6.  Status post right total hip arthroplasty in 2013.    Jules feels well today and has no acute complaints.  He is due for a follow-up ultrasound on his thyroid in regards to his thyroid nodule.  Otherwise past medical and surgical history reviewed.  Medications and allergies were reviewed and reconciled.  No family history of prostate cancer or colon cancer.  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  He is  and has 3 kids and 11 grandchildren.  Non-smoker.    OBJECTIVE:    In general the patient is alert pleasant and in no acute distress.    HEENT: Pupils equal round reactive light.  Oropharynx is clear.  Lymphatic shows no anterior posterior cervical lymphadenopathy.  No thyromegaly or other masses noted.    Cardiovascular: S1-S2 regular in rhythm no murmurs gallops rubs    Lungs are clear    Abdomen is soft nontender nondistended.  No HSM.    Extremities show no pedal edema present bilaterally.  DP pulses are 2+ and normal bilaterally.    Skin exam shows no concerning skin lesions.    Assessment and plan: Physical exam along with the followin.  Thyroid nodule.  Again an ultrasound was ordered.  2.  Type 2 diabetes.  Check a hemoglobin A1c, LDL, CMP, and urine microalbumin.  3.  Healthcare maintenance.  Colon cancer screening is up-to-date.  He was given his Prevnar and flu shots today.  Check PSA.  Follow-up 1 year, earlier if needed.

## 2021-06-20 NOTE — LETTER
Letter by Luan Horner MD at      Author: Luan Horner MD Service: -- Author Type: --    Filed:  Encounter Date: 12/24/2019 Status: Signed           December 24, 2019      Patient: Jules Cheng   YOB: 1955   Date of Visit: 12/23/2019         To Whom It May Concern:     It is my medical opinion that Jules Cheng had an appointment with me on 12/23/2019.     If you have any questions or concerns, please don't hesitate to call.     Sincerely,           Electronically signed by Luis Servin CNP

## 2021-06-20 NOTE — LETTER
Letter by Luan Horner MD at      Author: Luan Horner MD Service: -- Author Type: --    Filed:  Encounter Date: 9/17/2020 Status: (Other)

## 2021-06-23 NOTE — PROGRESS NOTES
ASSESSMENT:   1. Epididymitis  Urinalysis    US Scrotum and Testicles W Duplex Ltd    doxycycline (VIBRA-TABS) 100 MG tablet       PLAN:  Differential diagnosis includes but is not limited to testicular torsion, epididymitis, orchitis, UTI, others. Exam does reveal TTP over the posterior right testicle as well as the epididymis.  Patient was unable to provide urine specimen in clinic as he had voided upon arriving to clinic.  Sent to [facility] for imaging of the testicles and will attempt to provide urine specimen while at hospital.  Ultrasound reveals enlargement of the right epididymal tail consistent with acute epididymitis.  Will cover patient with a 2-week course of doxycycline, follow-up with primary care if no improvement over the course of the next several days, fevers develop or with any other concerns.  Results and plan were communicated to patient over the phone as he was at [facility] for imaging. UA was obtained at [facility] and is normal.    I discussed red flag symptoms, return precautions to clinic/ER and follow up care with patient/parent.  Expected clinical course, symptomatic cares advised. Questions answered. Patient/parent amenable with plan.    Patient Instructions:  There are no Patient Instructions on file for this visit.    SUBJECTIVE:   Jules Cheng is a 63 y.o. male who presents today with right testicular pain for the past 2 days.  The pain is improving, however he notes he feels it is swollen.  He had a vasectomy 30+ years ago.  He does states it tender to the touch.  No dysuria, hematuria or increase in urinary frequency. No trauma. No fevers.  No abdominal pain.  No nausea or vomiting.  No penile discharge.  No recent oral infections, oral pain, jaw pain, ear pain. No facial or mandibular swelling. Denies concern for STI, , one partner.      ROS:  Comprehensive 12 pt ROS completed, positives noted in HPI, otherwise negative.      Past Medical  History:  Patient Active Problem List   Diagnosis     Benign Adenomatous Polyp Of The Large Intestine     Hyperlipemia     Hypertension     Herpes Simplex Type II     Coronary artery calcification seen on CAT scan 131 (2014)     Diabetes mellitus, type 2 (H)     Right thyroid nodule       Surgical History:  Past Surgical History:   Procedure Laterality Date     TOTAL HIP ARTHROPLASTY Left 2012     TOTAL HIP ARTHROPLASTY Right            Family History:  Family History   Problem Relation Age of Onset     Diabetes Paternal Aunt      Arthritis Maternal Aunt         Rheumatoid     Hypertension Mother      Heart disease Mother        Reviewed; Non-contributory    Social History     Tobacco Use   Smoking Status Former Smoker     Last attempt to quit:      Years since quittin.1   Smokeless Tobacco Never Used       Current Medications:  Current Outpatient Medications on File Prior to Visit   Medication Sig Dispense Refill     aspirin 81 MG EC tablet Take 81 mg by mouth daily.       atorvastatin (LIPITOR) 40 MG tablet TAKE 1 TABLET DAILY. 90 tablet 3     chlorthalidone (HYGROTEN) 25 MG tablet Take 0.5 tablets (12.5 mg total) by mouth daily. 45 tablet 2     cholecalciferol, vitamin D3, (VITAMIN D3) 1,000 unit capsule Take 1,000 Units by mouth daily.       lisinopril (PRINIVIL,ZESTRIL) 20 MG tablet TAKE 1 TABLET DAILY 90 tablet 0     valACYclovir (VALTREX) 500 MG tablet Take 1 tablet (500 mg total) by mouth daily. 90 tablet 3     valACYclovir (VALTREX) 1000 MG tablet Take 0.5 tablets (500 mg total) by mouth daily. 45 tablet 3     No current facility-administered medications on file prior to visit.        Allergies:   Allergies   Allergen Reactions     Metformin Itching     Perianal itch and some erythema and skin itch on arms etc.        OBJECTIVE:   Vitals:    19 1619 19 1639   BP: (!) 155/94 (!) 163/98   Patient Site: Right Arm Right Arm   Patient Position: Sitting Sitting   Cuff Size: Adult  Large Adult Large   Pulse: 61 (!) 59   Resp: 15    Temp: 98.6  F (37  C)    TempSrc: Oral    SpO2: 95%    Weight: (!) 244 lb 3 oz (110.8 kg)      Physical exam reveals a pleasant 63 y.o. male.   Appears healthy, alert and cooperative. Non-toxic appearance.  Lungs: even, unlabored resp  Heart: regular rate and normal peripheral perfusion  Abdomen: soft, nontender. No masses or organomegaly  : scrotum appears reddened, right testicle is higher riding than left, tender to palpation.  There is pain with palpation over the epididymis.  Normal cremasteric reflexes.  Skin: pink, warm, dry, and without lesions on limited skin exam. No rashes.       RADIOLOGY    Us Scrotum And Testicles W Duplex Ltd    Result Date: 2/5/2019  US SCROTUM AND TESTICLES WITH DUPLEX LIMITED 2/5/2019 4:34 PM INDICATION: Right testicular pain TECHNIQUE: Ultrasound of scrotum with duplex utilizing 2D gray-scale imaging, Doppler interrogation with color-flow and spectral waveform analysis. COMPARISON: None. FINDINGS: RIGHT: Right testicle measures 4.6 x 3.6 x 2.3 cm. Normal testicle with no masses. Normal arterial duplex and normal color flow. The right epididymal tail is enlarged and hyperemic. Small hydrocele. LEFT: Left testicle measures 5.0 x 3.1 x 2.1 cm. Normal testicle with no masses. Normal arterial duplex and normal color flow. Small epididymal head cyst measuring 4 x 3 x 3 mm small hydrocele.     CONCLUSION: 1.  Both testicles are normal. 2.  Enlarged and hyperemic right epididymal tail likely representing an acute epididymitis. 3.  Small bilateral hydroceles.      LABORATORY STUDIES    Recent Results (from the past 24 hour(s))   Urinalysis   Result Value Ref Range    Color, UA Yellow Colorless, Yellow, Straw, Light Yellow    Clarity, UA Clear Clear    Glucose, UA Negative Negative    Bilirubin, UA Negative Negative    Ketones, UA Negative Negative    Specific Gravity, UA 1.010 1.001 - 1.030    Blood, UA Negative Negative    pH, UA 7.0  4.5 - 8.0    Protein, UA Negative Negative mg/dL    Urobilinogen, UA <2.0 E.U./dL <2.0 E.U./dL, 2.0 E.U./dL    Nitrite, UA Negative Negative    Leukocytes, UA Negative Negative           Kellee Chandra, CNP

## 2021-06-24 NOTE — TELEPHONE ENCOUNTER
Refill Approved    Rx renewed per Medication Renewal Policy. Medication was last renewed on 8/23/18  OV 9/17/18.    Saida Felton, [facility] Connection Triage/Med Refill 3/9/2019     Requested Prescriptions   Pending Prescriptions Disp Refills     lisinopril (PRINIVIL,ZESTRIL) 20 MG tablet [Pharmacy Med Name: LISINOPRIL TAB 20MG] 90 tablet 0     Sig: TAKE 1 TABLET DAILY    Ace Inhibitors Refill Protocol Passed - 3/8/2019  2:18 AM       Passed - PCP or prescribing provider visit in past 12 months      Last office visit with prescriber/PCP: Visit date not found OR same dept: 9/10/2018 Luan Horner MD OR same specialty: 9/10/2018 Luan Horner MD  Last physical: Visit date not found Last MTM visit: Visit date not found   Next visit within 3 mo: Visit date not found  Next physical within 3 mo: Visit date not found  Prescriber OR PCP: Martina Stoner MD  Last diagnosis associated with med order: 1. Hypertension  - lisinopril (PRINIVIL,ZESTRIL) 20 MG tablet [Pharmacy Med Name: LISINOPRIL TAB 20MG]; TAKE 1 TABLET DAILY  Dispense: 90 tablet; Refill: 0    If protocol passes may refill for 12 months if within 3 months of last provider visit (or a total of 15 months).            Passed - Serum Potassium in past 12 months    Lab Results   Component Value Date    Potassium 4.5 09/17/2018            Passed - Blood pressure filed in past 12 months    BP Readings from Last 1 Encounters:   02/05/19 (!) 163/98            Passed - Serum Creatinine in past 12 months    Creatinine   Date Value Ref Range Status   09/17/2018 0.78 0.70 - 1.30 mg/dL Final

## 2021-07-01 ENCOUNTER — APPOINTMENT (OUTPATIENT)
Dept: CT IMAGING | Facility: CLINIC | Age: 66
End: 2021-07-01
Attending: EMERGENCY MEDICINE
Payer: MEDICARE

## 2021-07-01 ENCOUNTER — NURSE TRIAGE (OUTPATIENT)
Dept: FAMILY MEDICINE | Facility: CLINIC | Age: 66
End: 2021-07-01

## 2021-07-01 ENCOUNTER — HOSPITAL ENCOUNTER (EMERGENCY)
Facility: CLINIC | Age: 66
Discharge: HOME | End: 2021-07-01
Attending: EMERGENCY MEDICINE | Admitting: EMERGENCY MEDICINE
Payer: MEDICARE

## 2021-07-01 ENCOUNTER — APPOINTMENT (OUTPATIENT)
Dept: MRI IMAGING | Facility: CLINIC | Age: 66
End: 2021-07-01
Attending: EMERGENCY MEDICINE
Payer: MEDICARE

## 2021-07-01 VITALS
DIASTOLIC BLOOD PRESSURE: 86 MMHG | SYSTOLIC BLOOD PRESSURE: 133 MMHG | OXYGEN SATURATION: 97 % | TEMPERATURE: 97 F | RESPIRATION RATE: 18 BRPM | WEIGHT: 220 LBS | BODY MASS INDEX: 29.8 KG/M2 | HEART RATE: 72 BPM | HEIGHT: 72 IN

## 2021-07-01 DIAGNOSIS — R29.898 ARM HEAVINESS: ICD-10-CM

## 2021-07-01 DIAGNOSIS — R42 LIGHTHEADEDNESS: ICD-10-CM

## 2021-07-01 DIAGNOSIS — G45.9 TIA (TRANSIENT ISCHEMIC ATTACK): ICD-10-CM

## 2021-07-01 DIAGNOSIS — R42 DIZZINESS: ICD-10-CM

## 2021-07-01 LAB
ANION GAP SERPL CALCULATED.3IONS-SCNC: 6 MMOL/L (ref 3–14)
APTT PPP: 36 SEC (ref 22–37)
BASOPHILS # BLD AUTO: 0 10E9/L (ref 0–0.2)
BASOPHILS NFR BLD AUTO: 0.3 %
BUN SERPL-MCNC: 27 MG/DL (ref 7–30)
CALCIUM SERPL-MCNC: 10 MG/DL (ref 8.5–10.1)
CHLORIDE SERPL-SCNC: 103 MMOL/L (ref 94–109)
CHOLEST SERPL-MCNC: 114 MG/DL
CO2 SERPL-SCNC: 29 MMOL/L (ref 20–32)
CREAT SERPL-MCNC: 1.15 MG/DL (ref 0.66–1.25)
DIFFERENTIAL METHOD BLD: ABNORMAL
EOSINOPHIL # BLD AUTO: 0.1 10E9/L (ref 0–0.7)
EOSINOPHIL NFR BLD AUTO: 0.4 %
ERYTHROCYTE [DISTWIDTH] IN BLOOD BY AUTOMATED COUNT: 12.8 % (ref 10–15)
GFR SERPL CREATININE-BSD FRML MDRD: 65 ML/MIN/{1.73_M2}
GLUCOSE SERPL-MCNC: 96 MG/DL (ref 70–99)
HBA1C MFR BLD: 6.1 % (ref 0–5.6)
HCT VFR BLD AUTO: 45.2 % (ref 40–53)
HDLC SERPL-MCNC: 36 MG/DL
HGB BLD-MCNC: 15.4 G/DL (ref 13.3–17.7)
IMM GRANULOCYTES # BLD: 0 10E9/L (ref 0–0.4)
IMM GRANULOCYTES NFR BLD: 0.3 %
INR PPP: 1.17 (ref 0.86–1.14)
LDLC SERPL CALC-MCNC: 47 MG/DL
LYMPHOCYTES # BLD AUTO: 1.9 10E9/L (ref 0.8–5.3)
LYMPHOCYTES NFR BLD AUTO: 16.6 %
MCH RBC QN AUTO: 33.5 PG (ref 26.5–33)
MCHC RBC AUTO-ENTMCNC: 34.1 G/DL (ref 31.5–36.5)
MCV RBC AUTO: 98 FL (ref 78–100)
MONOCYTES # BLD AUTO: 0.7 10E9/L (ref 0–1.3)
MONOCYTES NFR BLD AUTO: 5.7 %
NEUTROPHILS # BLD AUTO: 9 10E9/L (ref 1.6–8.3)
NEUTROPHILS NFR BLD AUTO: 76.7 %
NONHDLC SERPL-MCNC: 78 MG/DL
NRBC # BLD AUTO: 0 10*3/UL
NRBC BLD AUTO-RTO: 0 /100
PLATELET # BLD AUTO: 213 10E9/L (ref 150–450)
POTASSIUM SERPL-SCNC: 4.3 MMOL/L (ref 3.4–5.3)
RBC # BLD AUTO: 4.6 10E12/L (ref 4.4–5.9)
SODIUM SERPL-SCNC: 138 MMOL/L (ref 133–144)
TRIGL SERPL-MCNC: 156 MG/DL
TROPONIN I SERPL-MCNC: <0.015 UG/L (ref 0–0.04)
WBC # BLD AUTO: 11.7 10E9/L (ref 4–11)

## 2021-07-01 PROCEDURE — 70450 CT HEAD/BRAIN W/O DYE: CPT | Mod: MG

## 2021-07-01 PROCEDURE — 85025 COMPLETE CBC W/AUTO DIFF WBC: CPT | Performed by: EMERGENCY MEDICINE

## 2021-07-01 PROCEDURE — 70496 CT ANGIOGRAPHY HEAD: CPT | Mod: ME

## 2021-07-01 PROCEDURE — 80061 LIPID PANEL: CPT | Performed by: EMERGENCY MEDICINE

## 2021-07-01 PROCEDURE — 84484 ASSAY OF TROPONIN QUANT: CPT | Performed by: EMERGENCY MEDICINE

## 2021-07-01 PROCEDURE — 250N000011 HC RX IP 250 OP 636: Performed by: EMERGENCY MEDICINE

## 2021-07-01 PROCEDURE — 93010 ELECTROCARDIOGRAM REPORT: CPT | Performed by: EMERGENCY MEDICINE

## 2021-07-01 PROCEDURE — 70553 MRI BRAIN STEM W/O & W/DYE: CPT | Mod: MG

## 2021-07-01 PROCEDURE — 83036 HEMOGLOBIN GLYCOSYLATED A1C: CPT | Performed by: EMERGENCY MEDICINE

## 2021-07-01 PROCEDURE — 93005 ELECTROCARDIOGRAM TRACING: CPT | Performed by: EMERGENCY MEDICINE

## 2021-07-01 PROCEDURE — 80048 BASIC METABOLIC PNL TOTAL CA: CPT | Performed by: EMERGENCY MEDICINE

## 2021-07-01 PROCEDURE — 85730 THROMBOPLASTIN TIME PARTIAL: CPT | Performed by: EMERGENCY MEDICINE

## 2021-07-01 PROCEDURE — 99285 EMERGENCY DEPT VISIT HI MDM: CPT | Mod: 25 | Performed by: EMERGENCY MEDICINE

## 2021-07-01 PROCEDURE — 85610 PROTHROMBIN TIME: CPT | Performed by: EMERGENCY MEDICINE

## 2021-07-01 PROCEDURE — 250N000009 HC RX 250: Performed by: EMERGENCY MEDICINE

## 2021-07-01 PROCEDURE — A9585 GADOBUTROL INJECTION: HCPCS | Performed by: EMERGENCY MEDICINE

## 2021-07-01 PROCEDURE — 255N000002 HC RX 255 OP 636: Performed by: EMERGENCY MEDICINE

## 2021-07-01 RX ORDER — SODIUM CHLORIDE 9 MG/ML
INJECTION, SOLUTION INTRAVENOUS CONTINUOUS PRN
Status: DISCONTINUED | OUTPATIENT
Start: 2021-07-01 | End: 2021-07-01 | Stop reason: HOSPADM

## 2021-07-01 RX ORDER — GADOBUTROL 604.72 MG/ML
10 INJECTION INTRAVENOUS ONCE
Status: COMPLETED | OUTPATIENT
Start: 2021-07-01 | End: 2021-07-01

## 2021-07-01 RX ORDER — IOPAMIDOL 755 MG/ML
70 INJECTION, SOLUTION INTRAVASCULAR ONCE
Status: COMPLETED | OUTPATIENT
Start: 2021-07-01 | End: 2021-07-01

## 2021-07-01 RX ORDER — CLOPIDOGREL BISULFATE 75 MG/1
75 TABLET ORAL DAILY
Qty: 21 TABLET | Refills: 0 | Status: SHIPPED | OUTPATIENT
Start: 2021-07-01 | End: 2021-07-12

## 2021-07-01 RX ADMIN — GADOBUTROL 10 ML: 604.72 INJECTION INTRAVENOUS at 16:18

## 2021-07-01 RX ADMIN — IOPAMIDOL 70 ML: 755 INJECTION, SOLUTION INTRAVENOUS at 13:55

## 2021-07-01 RX ADMIN — SODIUM CHLORIDE 100 ML: 9 INJECTION, SOLUTION INTRAVENOUS at 13:55

## 2021-07-01 ASSESSMENT — ENCOUNTER SYMPTOMS
DIZZINESS: 1
ENDOCRINE NEGATIVE: 1
RESPIRATORY NEGATIVE: 1
PSYCHIATRIC NEGATIVE: 1
MUSCULOSKELETAL NEGATIVE: 1
GASTROINTESTINAL NEGATIVE: 1
LIGHT-HEADEDNESS: 1
CARDIOVASCULAR NEGATIVE: 1
CONSTITUTIONAL NEGATIVE: 1
EYES NEGATIVE: 1
ALLERGIC/IMMUNOLOGIC NEGATIVE: 1
HEMATOLOGIC/LYMPHATIC NEGATIVE: 1

## 2021-07-01 ASSESSMENT — MIFFLIN-ST. JEOR: SCORE: 1810.91

## 2021-07-01 NOTE — TELEPHONE ENCOUNTER
"  Patient states he feels ill, headache and dizzy.      Reason for Disposition    Systolic BP < 80 and NOT dizzy, lightheaded or weak (feels normal)    Additional Information    Negative: Started suddenly after an allergic medicine, an allergic food, or bee sting    Negative: Shock suspected (e.g., cold/pale/clammy skin, too weak to stand, low BP, rapid pulse)    Negative: Difficult to awaken or acting confused  (e.g., disoriented, slurred speech)    Negative: Fainted    Negative: Chest pain    Negative: Bleeding (e.g., vomiting blood, rectal bleeding or tarry stools, severe vaginal bleeding)    Negative: Extra heart beats or heart is beating fast  (i.e., \"palpitations\")    Negative: Sounds like a life-threatening emergency to the triager    Negative: Abdominal pain    Negative: Major surgery in the past month    Negative: Fever > 100.4 F (38.0 C)    Negative: Drinking very little and has signs of dehydration (e.g., no urine > 12 hours, very dry mouth, very lightheaded)    Negative: Fall in systolic BP > 20 mm Hg from normal and feeling dizzy, lightheaded, or weak    Negative: Patient sounds very sick or weak to the triager    Negative: Systolic BP < 90 and NOT dizzy, lightheaded or weak    Negative: Systolic BP  while taking blood pressure medications and NOT dizzy, lightheaded or weak    Negative: Systolic BP < 90 and feeling dizzy, lightheaded, or weak    Answer Assessment - Initial Assessment Questions  1. BLOOD PRESSURE: \"What is the blood pressure?\" \"Did you take at least two measurements 5 minutes apart?\"      The blood pressure 80/40 and then 94/55 and then 102/62  2. ONSET: \"When did you take your blood pressure?\"      While talking with RN  3. HOW: \"How did you obtain the blood pressure?\" (e.g., visiting nurse, automatic home BP monitor)      Automatic home BP monitory  4. HISTORY: \"Do you have a history of low blood pressure?\" \"What is your blood pressure normally?\"      140/70  5. MEDICATIONS: \"Are " "you taking any medications for blood pressure?\" If yes: \"Have they been changed recently?\"      No changes in medications  6. PULSE RATE: \"Do you know what your pulse rate is?\"       Pulse is 88  7. OTHER SYMPTOMS: \"Have you been sick recently?\" \"Have you had a recent injury?\"      Feeling ill, dizziness and headache  8. PREGNANCY: \"Is there any chance you are pregnant?\" \"When was your last menstrual period?\"      na    Protocols used: LOW BLOOD PRESSURE-A-OH      "

## 2021-07-01 NOTE — ED PROVIDER NOTES
"  History     Chief Complaint   Patient presents with     Hypotension     pt reports low reading at home 80/40.     HPI  Jules Cheng is a 65 year old male who presents with concern about low blood pressure at home.  Patient's medical record indicate a history of hyperlipidemia, genital herpes, hypertension history of neoplasm of the colon, type 2 diabetes right thyroid nodule.  Patient is currently prescribed acyclovir, Lipitor, Hygroton, gabapentin, lisinopril, 81 mg aspirin and multivitamins.      Patient arrived by car with his wife Annette from home in [place] reporting that earlier this morning he was working in the yard between 9 AM and 11 AM.  He reports he has a self-propelled mower and mowed less than an acre.  While watering potted plants he developed a sensation that he was dizzy and lightheaded.  He reports that his arms felt heavy but not weak.  Patient reports there was no chest pain or back pain.  He reported no neck pain and no recent neck manipulation.  He reports a dull frontal headache for about 4 days which he attributed to sinus allergies.  He has been taking Sudafed and Advil as needed.  \"The headache was bad enough yesterday that I thought I was going to come in\".. Patient initially thought that it was because of working outside in the heat.  He went inside ate leftover chicken checked his blood pressure and noted his blood pressure was \"88/40's\".  He called the clinic and it was advised that he present to the department to be evaluated.  Patient reports no prior history of stroke.  Former smoker who quit over 20 years ago.  He has been compliant with his prescribed medications.  No facial numbness or weakness, no extremity weakness or numbness no speech difficulty and no visual problems.     Allergies:  Allergies   Allergen Reactions     Metformin Itching     Perianal itch and some erythema and skin itch on arms etc.        Problem List:    Patient Active Problem List    " Diagnosis Date Noted     Benign neoplasm of colon 10/15/2020     Priority: Medium     Created by Conversion       Genital herpes 10/15/2020     Priority: Medium     Created by Conversion    Replacement Utility updated for latest IMO load       Hyperlipemia 10/15/2020     Priority: Medium     Created by Conversion       Hypertension 10/15/2020     Priority: Medium     Created by Conversion    Replacement Utility updated for latest IMO load       Right thyroid nodule 09/17/2018     Priority: Medium     Diabetes mellitus, type 2 (H) 01/22/2016     Priority: Medium     Coronary artery calcification seen on CAT scan 12/05/2014     Priority: Medium        Past Medical History:    Past Medical History:   Diagnosis Date     Osteoarthrosis, unspecified whether generalized or localized, pelvic region and thigh      Unspecified essential hypertension        Past Surgical History:    Past Surgical History:   Procedure Laterality Date     JOINT REPLACEMENT      left and right total hip replacement        Family History:    No family history on file.    Social History:  Marital Status:   [2]  Social History     Tobacco Use     Smoking status: Former Smoker     Types: Cigarettes     Smokeless tobacco: Never Used   Substance Use Topics     Alcohol use: Not Currently     Drug use: Never        Medications:    clopidogrel (PLAVIX) 75 MG tablet  acyclovir (ZOVIRAX) 400 MG tablet  aspirin (ASA) 81 MG EC tablet  atorvastatin (LIPITOR) 40 MG tablet  Calcium Carbonate (CALCIUM 600 PO)  chlorthalidone (HYGROTON) 25 MG tablet  cholecalciferol (VITAMIN D3) 25 mcg (1000 units) capsule  Cyanocobalamin (VITAMIN B12 PO)  gabapentin (NEURONTIN) 300 MG capsule  lisinopril (ZESTRIL) 20 MG tablet          Review of Systems   Constitutional: Negative.    HENT: Negative.    Eyes: Negative.    Respiratory: Negative.    Cardiovascular: Negative.    Gastrointestinal: Negative.    Endocrine: Negative.    Genitourinary: Negative.    Musculoskeletal:  Negative.         Episode of arm heaviness while watering watered plants   Skin: Negative.    Allergic/Immunologic: Negative.    Neurological: Positive for dizziness and light-headedness.   Hematological: Negative.    Psychiatric/Behavioral: Negative.    All other systems reviewed and are negative.      Physical Exam   BP: 115/72  Pulse: 79  Temp: 97  F (36.1  C)  Resp: 18  Height: 182.9 cm (6')  Weight: 99.8 kg (220 lb)  SpO2: 99 %  Lying Orthostatic BP: 124/69  Lying Orthostatic Pulse: 75 bpm  Standing Orthostatic BP: 101/66  Standing Orthostatic Pulse: 89 bpm      Physical Exam  Constitutional:       General: He is not in acute distress.     Appearance: Normal appearance. He is not ill-appearing, toxic-appearing or diaphoretic.   HENT:      Head: Normocephalic and atraumatic.      Right Ear: Tympanic membrane normal.      Left Ear: Tympanic membrane normal.      Nose: Nose normal.      Mouth/Throat:      Mouth: Mucous membranes are moist.   Eyes:      Extraocular Movements: Extraocular movements intact.      Pupils: Pupils are equal, round, and reactive to light.   Neck:      Musculoskeletal: Normal range of motion and neck supple.   Cardiovascular:      Rate and Rhythm: Normal rate and regular rhythm.      Pulses: Normal pulses.      Heart sounds: Normal heart sounds.   Pulmonary:      Effort: Pulmonary effort is normal. No respiratory distress.      Breath sounds: Normal breath sounds. No stridor. No wheezing, rhonchi or rales.   Chest:      Chest wall: No tenderness.   Musculoskeletal:         General: No swelling, tenderness, deformity or signs of injury.      Right lower leg: No edema.      Left lower leg: No edema.   Skin:     Capillary Refill: Capillary refill takes less than 2 seconds.      Coloration: Skin is not jaundiced or pale.      Findings: No bruising, erythema, lesion or rash.   Neurological:      General: No focal deficit present.      Mental Status: He is alert and oriented to person, place,  and time.      Cranial Nerves: No cranial nerve deficit.      Sensory: No sensory deficit.      Motor: No weakness.      Coordination: Coordination normal.      Gait: Gait normal.      Deep Tendon Reflexes: Reflexes normal.   Psychiatric:         Mood and Affect: Mood normal.         Behavior: Behavior normal.         Thought Content: Thought content normal.         Judgment: Judgment normal.       National [employer] Stroke Scale  Exam Interval: Baseline   Score    Level of consciousness: (0)   Alert, keenly responsive    LOC questions: (0)   Answers both questions correctly    LOC commands: (0)   Performs both tasks correctly    Best gaze: (0)   Normal    Visual: (0)   No visual loss    Facial palsy: (0)   Normal symmetrical movements    Motor arm (left): (0)   No drift    Motor arm (right): (0)   No drift    Motor leg (left): (0)   No drift    Motor leg (right): (0)   No drift    Limb ataxia: (0)   Absent    Sensory: (0)   Normal- no sensory loss    Best language: (0)   Normal- no aphasia    Dysarthria: (0)   Normal    Extinction and inattention: (0)   No abnormality        Total Score:  0       ED Course        Procedures               EKG Interpretation:      Interpreted by Delvis Xiong MD  Time reviewed: 14:15  Symptoms at time of EKG: None   Rhythm: normal sinus   Rate: Normal  Axis: Normal  Ectopy: none  Conduction: normal  ST Segments/ T Waves: Non-specific ST-T wave changes  Q Waves: nonspecific  Comparison to prior: No viewable EKG for comparison    Clinical Impression: No acute ischemia or arrhythmia.      Critical Care time:  was 30 minutes for this patient excluding procedures.               ED medications:  Medications   sodium chloride 0.9% infusion (has no administration in time range)   0.9% sodium chloride BOLUS (has no administration in time range)   iopamidol (ISOVUE-370) solution 70 mL (70 mLs Intravenous Given 7/1/21 1180)   sodium chloride 0.9 % bag 500mL for CT scan flush  use (100 mLs Intravenous Given 7/1/21 1355)   gadobutrol (GADAVIST) injection 10 mL (10 mLs Intravenous Given 7/1/21 1618)       ED Vitals:  Vitals:    07/01/21 1315 07/01/21 1330 07/01/21 1345 07/01/21 1739   BP: 125/80 129/76 117/76 133/86   Pulse: 76 75 79 74   Resp:       Temp:       TempSrc:       SpO2: 98% 98% 95% 98%   Weight:       Height:         ED labs and imaging:  Results for orders placed or performed during the hospital encounter of 07/01/21   CT Head w/o Contrast     Status: None    Narrative    CT HEAD W/O CONTRAST 7/1/2021 2:09 PM    INDICATION: Dizziness, non-specific; ; 4-day history of frontal  headache, episode of dizziness and lightheadedness while working out  in the yard.  History of hypertension treated.  Evaluate for acute  intracranial process.  TECHNIQUE: CT scan of the head without contrast. Dose reduction  techniques were used.  CONTRAST: None.  COMPARISON: None.    FINDINGS:   No intracranial hemorrhage, extraaxial collection, mass effect or CT  evidence of acute infarct. Tiny chronic right cerebellar infarct.  Minor presumed chronic small vessel ischemic changes. Minor  generalized volume loss. The ventricles are proportional to the sulci.  Osseous structures are intact. Unremarkable orbits. Paranasal sinuses  are free of significant disease. Clear mastoid air cells.      Impression    IMPRESSION:  No intracranial hemorrhage, mass, or definite CT evidence of recent  ischemia.    NEETA PANG MD          SYSTEM ID:  [id number]   CTA Head Neck with Contrast     Status: None    Narrative    CT ANGIOGRAM OF THE HEAD AND NECK WITHOUT AND WITH CONTRAST  7/1/2021  2:11 PM     COMPARISON: None.    HISTORY: Dizziness, nonspecific. Stroke/TIA; assess extracranial  arteries. Four-day history of frontal headache, episode of dizziness  and lightheadedness while working out in the yard.  History of  hypertension treated.  Evaluate for acute intracranial process.    TECHNIQUE:  Precontrast  localizing scans were followed by CT  angiography with an injection of 70 mL Isovue-370 nonionic intravenous  contrast material with scans through the head and neck.  Images were  transferred to a separate 3-D workstation where multiplanar  reformations and 3-D images were created.  Estimates of carotid  stenoses are made relative to the distal internal carotid artery  diameters except as noted.      FINDINGS:   Neck CTA: The bilateral common carotid, bilateral cervical internal  carotid and bilateral vertebral arteries are patent without stenosis.     Head CTA: The basilar, bilateral intracranial distal internal carotid,  bilateral anterior cerebral, bilateral middle cerebral and bilateral  posterior cerebral arteries are patent and unremarkable.      Impression    IMPRESSION: Normal neck and head CTA.      Radiation dose for this scan was reduced using automated exposure  control, adjustment of the mA and/or kV according to patient size, or  iterative reconstruction technique.    BLAKE KUMAR MD          SYSTEM ID:  [id number]   MR Brain w/o & w Contrast     Status: None    Narrative    MR BRAIN W/O & W CONTRAST 7/1/2021 4:42 PM    INDICATION: Dizziness, non-specific; Episode of dizziness and  lightheadedness with arm heaviness.  History of hypertension.   Evaluate for posterior circulation stroke  TECHNIQUE: Noncontrast and contrast enhanced MRI of the brain.  CONTRAST: 10 ML GADAVIST  COMPARISON: Neck CTA 7/1/2021 4:43 PM    FINDINGS:  There is no restricted diffusion. Mild mucosal thickening  within the ethmoid air cells. Paranasal sinuses are otherwise free  from significant disease. Incidental Tornwaldt cyst within the  nasopharynx. Mastoid air cells appear free from significant disease.  Intraorbital contents are unremarkable. Ventricles are within normal  limits in size for the patient's age. Intracranial flow voids are  intact. There is no mass effect, midline shift, or extraaxial  collection. There are  scattered foci of T2/FLAIR hyperintensity within  the cerebral white matter that are nonspecific but most commonly  reflect the sequela of chronic small vessel ischemic disease. Tiny  chronic infarcts within the right thalamus and cerebellum. No evidence  for acute or chronic intracranial blood products.      Impression    IMPRESSION:   1.  No acute intracranial finding. No evidence for recent ischemia,  intracranial hemorrhage, or mass.  2.  Tiny chronic infarcts within the right thalamus and cerebellum.  3.  A few T2 hyperintense foci in the white matter are nonspecific but  most commonly reflect gliosis related to prior small vessel ischemic  insult.    NEETA PANG MD          SYSTEM ID:  [id number]   CBC with Platelets & Differential     Status: Abnormal   Result Value Ref Range    WBC 11.7 (H) 4.0 - 11.0 10e9/L    RBC Count 4.60 4.4 - 5.9 10e12/L    Hemoglobin 15.4 13.3 - 17.7 g/dL    Hematocrit 45.2 40.0 - 53.0 %    MCV 98 78 - 100 fl    MCH 33.5 (H) 26.5 - 33.0 pg    MCHC 34.1 31.5 - 36.5 g/dL    RDW 12.8 10.0 - 15.0 %    Platelet Count 213 150 - 450 10e9/L    Diff Method Automated Method     % Neutrophils 76.7 %    % Lymphocytes 16.6 %    % Monocytes 5.7 %    % Eosinophils 0.4 %    % Basophils 0.3 %    % Immature Granulocytes 0.3 %    Nucleated RBCs 0 0 /100    Absolute Neutrophil 9.0 (H) 1.6 - 8.3 10e9/L    Absolute Lymphocytes 1.9 0.8 - 5.3 10e9/L    Absolute Monocytes 0.7 0.0 - 1.3 10e9/L    Absolute Eosinophils 0.1 0.0 - 0.7 10e9/L    Absolute Basophils 0.0 0.0 - 0.2 10e9/L    Abs Immature Granulocytes 0.0 0 - 0.4 10e9/L    Absolute Nucleated RBC 0.0    Basic metabolic panel     Status: None   Result Value Ref Range    Sodium 138 133 - 144 mmol/L    Potassium 4.3 3.4 - 5.3 mmol/L    Chloride 103 94 - 109 mmol/L    Carbon Dioxide 29 20 - 32 mmol/L    Anion Gap 6 3 - 14 mmol/L    Glucose 96 70 - 99 mg/dL    Urea Nitrogen 27 7 - 30 mg/dL    Creatinine 1.15 0.66 - 1.25 mg/dL    GFR Estimate 65 >60  mL/min/[1.73_m2]    GFR Estimate If Black 75 >60 mL/min/[1.73_m2]    Calcium 10.0 8.5 - 10.1 mg/dL   INR     Status: Abnormal   Result Value Ref Range    INR 1.17 (H) 0.86 - 1.14   Partial thromboplastin time     Status: None   Result Value Ref Range    PTT 36 22 - 37 sec   Troponin I     Status: None   Result Value Ref Range    Troponin I ES <0.015 0.000 - 0.045 ug/L         Assessments & Plan (with Medical Decision Making)   Assessment Summary and Clinical Impression: 65-year-old male right hand dominant who presented with concern about low blood pressure reading at home after doing yard work with an episode of dizziness and lightheadedness with  report of 4-day history of dull frontal  headache.  Patient arrived asymptomatic and remained asymptomatic during his ED course.  His symptoms may be due to a TIA based on MRI interpretation findings.  After consultation with stroke neurology patient is discharged home with close outpatient follow-up through the TIA pathway referral service.    Patient reported he had mowed the yard which is less than a care on a self propelled machine and was watering plants when he had a sense of arm heaviness and developed a sense that he was dizzy and lightheaded.  Symptoms resolved on arrival in the department.  Hemodynamically normal on arrival without tension and no neurologic deficits specifically GCS 15 and a stroke score 0.  He reported no chest pain or palpitations or other prodrome.  His work-up in the department was    ED Course and Plan:  Reviewed the medical record.  Prior cardiovascular work-up and evaluation include-CT coronary calcium scan-see details in the medical record.  Patient was asymptomatic on arrival and we discussed possible causes for his episode.  He did not have any fainting spell, and did not have any associated prodrome.  We discussed the importance of excluding a cardiovascular or neurologic cause.  Symptoms as described and reported could be due to a  TIA..  With patient's age and comorbidities neuroimaging was obtained. blood work, EKG and  frequent vital signs and neurologic examination/assesment was completed.  EKG during his ED course revealed normal sinus rhythm, normal QTc.  There is no old viewable EKG for comparison.  Patient's work-up was reassuring today including neuroimaging.  Head CT did not show any mass or bleeding.  CT of the head and neck was also noted to be normal.  See additional details in the radiology report.  We discussed his reassuring multimodal CT imaging and reviewed the limitations of CT in excluding posterior circulation infarct given his dizziness and lightheadedness with hypotension at home.  I reviewed the role of MRI imaging and he requested MRI imaging prior to discharge because he would feel more reassured which I thought was reasonable given that his symptoms could have been due to a TIA  MRI imaging today revealed no acute or recent ischemia hemorrhage or mass.  There are tiny chronic infarcts within the right thalamus and cerebellum.  Nonspecific few T2 hyperintense focus in the white matter also noted felt to relate to gliosis from small vessel ischemic insult. See additional details in report.    With MRI interpretation  I reviewed patient's presentation with stroke neurology at the [facility] to ensure outpatient disposition with close follow-up was reasonable.. I spoke with Dr. CARINE Garcia at 5.07pm.  We reviewed patient's presentation to the department, and MRI findings. Dr Cline recommended dual antiplatelet therapy for 21 days-(aspirin 81 mg daily, Plavix 75 mg daily) and then full aspirin until follow-up.  TIA pathway outpatient referral placed for follow-up care with no indication for hospitalization.    I reviewed MRI findings and recommendation from stroke neurology with the patient and spouse present during ED course.  They expressed understanding, comfort and agreement with plan of care.    Disclaimer: This note  consists of symbols derived from keyboarding, dictation and/or voice recognition software. As a result, there may be errors in the script that have gone undetected. Please consider this when interpreting information found in this chart.  I have reviewed the nursing notes.    I have reviewed the findings, diagnosis, plan and need for follow up with the patient.       New Prescriptions    CLOPIDOGREL (PLAVIX) 75 MG TABLET    Take 1 tablet (75 mg) by mouth daily for 21 doses       Final diagnoses:   Dizziness - Episode while watering potted plants   Lightheadedness - While watering potted plants   Arm heaviness - With dizziness and lightheadedness while watering potted plants   TIA (transient ischemic attack)       7/1/2021   [facility] EMERGENCY DEPT     Delvis Xiong MD  07/02/21 0150

## 2021-07-01 NOTE — CONSULTS
"    [facility]    Stroke Telephone Note    I was called by Delvis Xiong on 07/01/21 regarding patient Jules Cheng. The patient is a 65 year old male with past medical history of hyperlipidemia, hypertension, and diabetes mellitus who presented with transient dizziness and unsteadiness and right arm heaviness.  CTA unremarkable, MRI showed evidence of old infarction.  His symptoms resolved prior to the ED.    Imaging Findings   CTA H/N: unremarkable    MRI with chronic infarctions    Impression  Transient ischemic attack versus related to low BP however right arm heaviness is not explained and MRI shows chronic infarctions.  He can complete the work up as an outpatient and be evaluated in the TIA clinic.    Recommendations   - Aspirin and plavix 75 mg daily for 21 days  - Please place order for TIA pathway     My recommendations are based on the information provided over the phone by Jules Cheng's in-person providers. They are not intended to replace the clinical judgment of his in-person providers. I was not requested to personally see or examine the patient at this time.    Bethany Cline MD   Neurocritical Care  To page me or covering stroke neurology team member, click here: [facility]COM   Choose \"On Call\" tab at top, then search dropdown box for \"Neurology Adult\", select location, press Enter, then look for stroke/neuro ICU/telestroke.           "

## 2021-07-01 NOTE — DISCHARGE INSTRUCTIONS
"1) Your evaluation today suggest you may have had a transient ischemic attack or \"mini stroke\"-with report of sudden onset of dizziness lightheadedness and arm heaviness that resolved prior to arrival in the department.    2) an MRI brain interpretation was discussed neck steps for care including follow-up for additional diagnostic testing and imaging after discussion and review of your history and imaging with the stroke neurologist on-call at the [facility]    3) You should begin taking a baby aspirin (81mg) plus Plavix 75 mg daily over the next 3 weeks.  After that on (July 22nd)you can begin taking a full aspirin daily.    4) You will be called with follow-up instructions for reevaluation and additional care.  We discussed that if you develop recurrence of dizziness and or lightheadedness or arm weakness or speech difficulty or blurry vision or worsening headache or any new concerns you should return to be reevaluated  "

## 2021-07-01 NOTE — ED NOTES
"Pt here with symptoms of hypotension, states he was working outside today and became lightheaded, checked his BP at home and states it was \"80 something over 40 something\". Does have a hx of hypertension and takes BP meds in the AM. Pt also has sinus symptoms, takes sudafed and advil regularly for that.   "

## 2021-07-01 NOTE — ED TRIAGE NOTES
Pt takes BP meds in morning. Pt states he was out in yard, felt dizzy and lightheaded. Also reports headache x 4 days. Pt checked BP at home with readings 80-90's systolic. Pt states he has been out in yard this morning but has been eating and drinking WNL.

## 2021-07-03 NOTE — ADDENDUM NOTE
Addendum Note by Richmond Nielsen CMA at 9/28/2020 10:41 AM     Author: Richmond Nielsen CMA Service: -- Author Type: Certified Medical Assistant    Filed: 9/28/2020 10:41 AM Encounter Date: 9/17/2020 Status: Signed    : Richmond Nielsen CMA (Certified Medical Assistant)    Addended by: RICHMOND NIELSEN on: 9/28/2020 10:41 AM        Modules accepted: Orders

## 2021-07-06 ENCOUNTER — VIRTUAL VISIT (OUTPATIENT)
Dept: NEUROLOGY | Facility: CLINIC | Age: 66
End: 2021-07-06
Attending: PHYSICIAN ASSISTANT
Payer: COMMERCIAL

## 2021-07-06 ENCOUNTER — TELEPHONE (OUTPATIENT)
Dept: FAMILY MEDICINE | Facility: CLINIC | Age: 66
End: 2021-07-06

## 2021-07-06 DIAGNOSIS — R42 LIGHTHEADEDNESS: ICD-10-CM

## 2021-07-06 PROCEDURE — 99204 OFFICE O/P NEW MOD 45 MIN: CPT | Mod: 95

## 2021-07-06 NOTE — TELEPHONE ENCOUNTER
Reason for Call:  Other     Detailed comments: Patient calling for Dr. Granados to decide if patient needs to do heart monitor and ECHO. Patient was recently seen in the emergency department 7/1/2021 and was told he might had a heat stroke or mini stroke. He had a virtual visit with Neurology today and wants Dr. Granados to be involve to patients diagnosis.    Phone Number Patient can be reached at: Home number on file [phone] (home)    Best Time: any    Can we leave a detailed message on this number? YES    Call taken on 7/6/2021 at 12:47 PM by Debbie Newton

## 2021-07-06 NOTE — LETTER
"    7/6/2021         RE: Jules Cheng  [address] [facility] [address]        Dear Colleague,    Thank you for referring your patient, Jules Cheng, to the [facility] [place]. Please see a copy of my visit note below.    SLICK is a 65 year old who is being evaluated via a billable video visit.      How would you like to obtain your AVS? MyChart  If the video visit is dropped, the invitation should be resent by: Send to e-mail at: [email or web address]  Will anyone else be joining your video visit? No      Video-Visit Details  Type of service:  Video Visit  Video Start Time: 1208  Video End Time:1232  Originating Location (pt. Location): Home  Distant Location (provider location):  [facility] [place]   Platform used for Video Visit: Daniele Prakash PA-C on 7/6/2021 at 2:10 PM  __________________________________________________________      [facility]    [facility] - [phone]  __________________________________________________________    Chief Complaint: Patient presents with:  Transient Ischemic Attack: Follow up    History of Present Illness: Jules Cheng is a 65 year old male past medical history of HTN, HLD, T2DM, former nicotine dependency, hx of neoplasm of the colon,  who is presenting as a new patient to TIA clinic. He initially presented to the [facility] emergency department on 07/01/2021 for evaluation. Mr. Cheng reports on the morning of presentation, he recalls that it was very hot out while he was working in the lawn. While out working, he recalls new onset lightheadedness, stating he felt \"woozy\" and \"not right\". He state he was able to finish his work, before returning inside where he checked his BP. He reports initial BP to be 88/44. Mr. Cheng states he sat inside, had some food, and rested. He shortly after rechecked his BP with SBP in 140's. He states he called his clinic who " advised that he present to the ED for further evaluation. Per chart review, he was reported to have some reported heaviness in his arm with possibly focality to the right. Today Mr. Cheng denies unilateral or focal arm involvement, stating that feeling was specifically felt in his back and may have radiated to the arms. He noted this feeling while he was moving pots around. He reports chronic history of sinus headaches that usually is associated with weather change, stating his last headache was on Monday. He states this one was different because it lasted longer than usual.     TIA Evaluation Summarized  MRI and/or Head CT: MRI brain negative for acute ischemic stroke, FLAIR sequence does demonstrate prior chronic infarct   Intracranial Vascular Imaging: CTA head negative for proximal LVO or flow limiting stenosis   Cervical Carotid and Vertebral Artery Vascular Imaging: CTA neck unrevealing   Echocardiogram: ordered (not yet completed)  EKG/Telemetry: SR  LDL: 47  A1c: 6.1  Other testing: Not Applicable    ABCD2 Patients Score   Age ? 60 years 1 point 1   Blood Pressure     -SBP ? 140 or DBP ?  90    1 point 0   Clinical Features    -Unilateral weakness   -Speech disturbance w/o weakness    -Other    2 points  1 point    0 points 0   Duration of symptoms    ?60 minutes    10-59 minutes    <10 minutes   2 points  1 point  0 points 1   Diabetes  1 point 0   Patient s ABCD2 Score (0-7) = 2     Prior to presenting to the ED, he was already taking ASA 81 mg, at time of discharge from ED he was started on Plavix 75 mg daily for recurrent stroke prevention. Since the above-mentioned ER visit, he denies any further reoccurrences of symptoms. Today he does not endorse current HA, dizziness, visual disturbances, focal weakness, or sensory deficits.     Impression:   Problem List Items Addressed This Visit        Other    Lightheadedness      65 year old male who presented to the ED for evaluation of transient  "lightheadedness in setting of environmental factors (hot day/possible dehydration) and hypotension (reported BP 88/44). After further discussion today, Mr. Cheng does not endorse any focality to his symptoms (reported RUE heaviness). These symptoms are less concerning for TIA and rather symptoms secondary to possible dehydration, cannot rule out cardiac sources as the etiology.     Plan:   -Given decreased suspicion for TIA, ok to discontinue Plavix and continue PTA ASA 81 mg daily   -Continue home statin   -Goal BP <130/80 with tighter control associated with decreased overall CV risk, if tolerated. Given BP fluctuation on day of symptoms, discussed the importance of home BP monitoring and keeping a log for PCP.  -Recommend Mr. Cheng complete cardiac aspect of workup which includes cardiac event monitoring and echo, discussed that he should follow up with his PCP on this who should be able to follow up on testing.   -No further follow up required with Stroke clinic      Stroke Education provided.  He will call us with any questions.  For any acute neurologic deficits he was advised to  go directly to the hospital rather than call the clinic.    Cindi Prakash PA-C  Neurology  07/06/2021 2:03 PM  To page me or covering stroke neurology team member, click here: AMCOM  Choose \"On Call\" tab at top, then search dropdown box for \"Neurology Adult\" & press Enter, look for Neuro ICU/Stroke    ___________________________________________________________________    Current Medications  Current Outpatient Medications   Medication Sig     acyclovir (ZOVIRAX) 400 MG tablet Take 1 tablet (400 mg) by mouth 2 times daily     aspirin (ASA) 81 MG EC tablet Take 81 mg by mouth     atorvastatin (LIPITOR) 40 MG tablet Take 1 tablet (40 mg) by mouth daily     Calcium Carbonate (CALCIUM 600 PO) Take by mouth daily     chlorthalidone (HYGROTON) 25 MG tablet Take 0.5 tablets (12.5 mg) by mouth daily     cholecalciferol (VITAMIN D3) 25 mcg " (1000 units) capsule Take 1,000 Units by mouth     clopidogrel (PLAVIX) 75 MG tablet Take 1 tablet (75 mg) by mouth daily for 21 doses     Cyanocobalamin (VITAMIN B12 PO)      lisinopril (ZESTRIL) 20 MG tablet Take 1 tablet (20 mg) by mouth daily     gabapentin (NEURONTIN) 300 MG capsule Take 1 capsule (300 mg) by mouth 2 times daily     No current facility-administered medications for this visit.        Past Medical History  Past Medical History:   Diagnosis Date     Osteoarthrosis, unspecified whether generalized or localized, pelvic region and thigh      Unspecified essential hypertension        Social History  Social History     Tobacco Use     Smoking status: Former Smoker     Types: Cigarettes     Smokeless tobacco: Never Used   Substance Use Topics     Alcohol use: Not Currently     Drug use: Never       Family History  No family history on file.    Physical Exam    Vitals - Patient Reported 7/6/2021   Weight (Patient Reported) 220 lb               General:  no acute distress  HEENT:  normocephalic/atraumatic  Pulmonary:  no respiratory distress    Neurologic   Mental Status: alert, oriented x 3, follows commands, speech clear and fluent  Cranial Nerves: facial movements symmetric, hearing not formally tested but intact to conversation, no dysarthria  Motor:  no abnormal movements, moving bilateral upper extremities spontaneously, unable to visualize BL LE (telestroke)  Reflexes:  unable to test (telestroke)  Sensory:  unable to test (telestroke)  Coordination: did not assess   Station/Gait:  unable to test (telestroke)    Neuroimaging: as per HPI. I personally reviewed those images    Labs:    Coagulation studies:  Recent Labs   Lab Test 07/01/21  1330   INR 1.17*        Lipid panel:  Recent Labs   Lab Test 07/01/21  1330 10/22/20  0825   CHOL 114 111   HDL 36* 37*   LDL 47 47   TRIG 156* 136       HbA1C:  Recent Labs   Lab Test 07/01/21  1330 11/10/20  0725 10/22/20  0825   A1C 6.1* 6.3* 6.4*                Again, thank you for allowing me to participate in the care of your patient.        Sincerely,         Neurology Stroke [facility]

## 2021-07-06 NOTE — PROGRESS NOTES
"SLICK is a 65 year old who is being evaluated via a billable video visit.      How would you like to obtain your AVS? MyChart  If the video visit is dropped, the invitation should be resent by: Send to e-mail at: [email or web address]  Will anyone else be joining your video visit? No      Video-Visit Details  Type of service:  Video Visit  Video Start Time: 1208  Video End Time:1232  Originating Location (pt. Location): Home  Distant Location (provider location):  [facility]UROLOG[facility] [place]   Platform used for Video Visit: Daniele Prakash PA-C on 7/6/2021 at 2:10 PM  __________________________________________________________      [facility]    Virtual Clinic - [phone]  __________________________________________________________    Chief Complaint: Patient presents with:  Transient Ischemic Attack: Follow up    History of Present Illness: Jules Cheng is a 65 year old male past medical history of HTN, HLD, T2DM, former nicotine dependency, hx of neoplasm of the colon,  who is presenting as a new patient to TIA clinic. He initially presented to the [facility] emergency department on 07/01/2021 for evaluation. Mr. Cheng reports on the morning of presentation, he recalls that it was very hot out while he was working in the lawn. While out working, he recalls new onset lightheadedness, stating he felt \"woozy\" and \"not right\". He state he was able to finish his work, before returning inside where he checked his BP. He reports initial BP to be 88/44. Mr. Cheng states he sat inside, had some food, and rested. He shortly after rechecked his BP with SBP in 140's. He states he called his clinic who advised that he present to the ED for further evaluation. Per chart review, he was reported to have some reported heaviness in his arm with possibly focality to the right. Today Mr. Cheng denies unilateral or focal arm involvement, stating that feeling was " specifically felt in his back and may have radiated to the arms. He noted this feeling while he was moving pots around. He reports chronic history of sinus headaches that usually is associated with weather change, stating his last headache was on Monday. He states this one was different because it lasted longer than usual.     TIA Evaluation Summarized  MRI and/or Head CT: MRI brain negative for acute ischemic stroke, FLAIR sequence does demonstrate prior chronic infarct   Intracranial Vascular Imaging: CTA head negative for proximal LVO or flow limiting stenosis   Cervical Carotid and Vertebral Artery Vascular Imaging: CTA neck unrevealing   Echocardiogram: ordered (not yet completed)  EKG/Telemetry: SR  LDL: 47  A1c: 6.1  Other testing: Not Applicable    ABCD2 Patients Score   Age ? 60 years 1 point 1   Blood Pressure     -SBP ? 140 or DBP ?  90    1 point 0   Clinical Features    -Unilateral weakness   -Speech disturbance w/o weakness    -Other    2 points  1 point    0 points 0   Duration of symptoms    ?60 minutes    10-59 minutes    <10 minutes   2 points  1 point  0 points 1   Diabetes  1 point 0   Patient s ABCD2 Score (0-7) = 2     Prior to presenting to the ED, he was already taking ASA 81 mg, at time of discharge from ED he was started on Plavix 75 mg daily for recurrent stroke prevention. Since the above-mentioned ER visit, he denies any further reoccurrences of symptoms. Today he does not endorse current HA, dizziness, visual disturbances, focal weakness, or sensory deficits.     Impression:   Problem List Items Addressed This Visit        Other    Lightheadedness      65 year old male who presented to the ED for evaluation of transient lightheadedness in setting of environmental factors (hot day/possible dehydration) and hypotension (reported BP 88/44). After further discussion today, Mr. Cheng does not endorse any focality to his symptoms (reported RUE heaviness). These symptoms are less concerning  "for TIA and rather symptoms secondary to possible dehydration, cannot rule out cardiac sources as the etiology.     Plan:   -Given decreased suspicion for TIA, ok to discontinue Plavix and continue PTA ASA 81 mg daily   -Continue home statin   -Goal BP <130/80 with tighter control associated with decreased overall CV risk, if tolerated. Given BP fluctuation on day of symptoms, discussed the importance of home BP monitoring and keeping a log for PCP.  -Recommend Mr. Cheng complete cardiac aspect of workup which includes cardiac event monitoring and echo, discussed that he should follow up with his PCP on this who should be able to follow up on testing.   -No further follow up required with Stroke clinic      Stroke Education provided.  He will call us with any questions.  For any acute neurologic deficits he was advised to  go directly to the hospital rather than call the clinic.    Cindi Prakash PA-C  Neurology  07/06/2021 2:03 PM  To page me or covering stroke neurology team member, click here: [employer]COM  Choose \"On Call\" tab at top, then search dropdown box for \"Neurology Adult\" & press Enter, look for Neuro ICU/Stroke    ___________________________________________________________________    Current Medications  Current Outpatient Medications   Medication Sig     acyclovir (ZOVIRAX) 400 MG tablet Take 1 tablet (400 mg) by mouth 2 times daily     aspirin (ASA) 81 MG EC tablet Take 81 mg by mouth     atorvastatin (LIPITOR) 40 MG tablet Take 1 tablet (40 mg) by mouth daily     Calcium Carbonate (CALCIUM 600 PO) Take by mouth daily     chlorthalidone (HYGROTON) 25 MG tablet Take 0.5 tablets (12.5 mg) by mouth daily     cholecalciferol (VITAMIN D3) 25 mcg (1000 units) capsule Take 1,000 Units by mouth     clopidogrel (PLAVIX) 75 MG tablet Take 1 tablet (75 mg) by mouth daily for 21 doses     Cyanocobalamin (VITAMIN B12 PO)      lisinopril (ZESTRIL) 20 MG tablet Take 1 tablet (20 mg) by mouth daily     gabapentin " (NEURONTIN) 300 MG capsule Take 1 capsule (300 mg) by mouth 2 times daily     No current facility-administered medications for this visit.        Past Medical History  Past Medical History:   Diagnosis Date     Osteoarthrosis, unspecified whether generalized or localized, pelvic region and thigh      Unspecified essential hypertension        Social History  Social History     Tobacco Use     Smoking status: Former Smoker     Types: Cigarettes     Smokeless tobacco: Never Used   Substance Use Topics     Alcohol use: Not Currently     Drug use: Never       Family History  No family history on file.    Physical Exam    Vitals - Patient Reported 7/6/2021   Weight (Patient Reported) 220 lb               General:  no acute distress  HEENT:  normocephalic/atraumatic  Pulmonary:  no respiratory distress    Neurologic   Mental Status: alert, oriented x 3, follows commands, speech clear and fluent  Cranial Nerves: facial movements symmetric, hearing not formally tested but intact to conversation, no dysarthria  Motor:  no abnormal movements, moving bilateral upper extremities spontaneously, unable to visualize BL LE (telestroke)  Reflexes:  unable to test (telestroke)  Sensory:  unable to test (telestroke)  Coordination: did not assess   Station/Gait:  unable to test (telestroke)    Neuroimaging: as per HPI. I personally reviewed those images    Labs:    Coagulation studies:  Recent Labs   Lab Test 07/01/21  1330   INR 1.17*        Lipid panel:  Recent Labs   Lab Test 07/01/21  1330 10/22/20  0825   CHOL 114 111   HDL 36* 37*   LDL 47 47   TRIG 156* 136       HbA1C:  Recent Labs   Lab Test 07/01/21  1330 11/10/20  0725 10/22/20  0825   A1C 6.1* 6.3* 6.4*

## 2021-07-07 NOTE — TELEPHONE ENCOUNTER
Routed to Dr Granados.    Pt was seen in ED on 7/1/21 for eval of dizziness, lightheaded, arm heaviness and TIA after he experienced symptoms while working in his yard.    He has echo arranged for tomorrow and event monitor tomorrow at [facility].      Pt asks if he should proceed with cardiac studies or does he need to see Dr Granados first for follow up?    Pt had virtual appt with neurology for stroke eval yesterday and was told he did not need further stroke monitoring.    I made appt for pt to follow up with Dr Granados on 7/12/21.    Leticia Lyles RN

## 2021-07-08 ENCOUNTER — HOSPITAL ENCOUNTER (OUTPATIENT)
Dept: CARDIOLOGY | Facility: CLINIC | Age: 66
Discharge: HOME | End: 2021-07-08
Attending: EMERGENCY MEDICINE | Admitting: EMERGENCY MEDICINE
Payer: MEDICARE

## 2021-07-08 DIAGNOSIS — R29.898 ARM HEAVINESS: ICD-10-CM

## 2021-07-08 DIAGNOSIS — R42 DIZZINESS: ICD-10-CM

## 2021-07-08 DIAGNOSIS — R42 LIGHTHEADEDNESS: ICD-10-CM

## 2021-07-08 DIAGNOSIS — G45.9 TIA (TRANSIENT ISCHEMIC ATTACK): ICD-10-CM

## 2021-07-08 LAB — LVEF ECHO: NORMAL

## 2021-07-08 PROCEDURE — 999N000208 ECHOCARDIOGRAM COMPLETE

## 2021-07-08 PROCEDURE — 93270 REMOTE 30 DAY ECG REV/REPORT: CPT

## 2021-07-08 PROCEDURE — 93306 TTE W/DOPPLER COMPLETE: CPT | Mod: 26 | Performed by: INTERNAL MEDICINE

## 2021-07-08 PROCEDURE — 255N000002 HC RX 255 OP 636: Performed by: INTERNAL MEDICINE

## 2021-07-08 RX ADMIN — HUMAN ALBUMIN MICROSPHERES AND PERFLUTREN 4 ML: 10; .22 INJECTION, SOLUTION INTRAVENOUS at 14:44

## 2021-07-12 ENCOUNTER — OFFICE VISIT (OUTPATIENT)
Dept: FAMILY MEDICINE | Facility: CLINIC | Age: 66
End: 2021-07-12
Payer: COMMERCIAL

## 2021-07-12 VITALS
OXYGEN SATURATION: 96 % | TEMPERATURE: 98.3 F | SYSTOLIC BLOOD PRESSURE: 124 MMHG | HEART RATE: 71 BPM | DIASTOLIC BLOOD PRESSURE: 72 MMHG | WEIGHT: 222 LBS | HEIGHT: 71 IN | RESPIRATION RATE: 14 BRPM | BODY MASS INDEX: 31.08 KG/M2

## 2021-07-12 DIAGNOSIS — R42 LIGHTHEADEDNESS: Primary | ICD-10-CM

## 2021-07-12 DIAGNOSIS — Z79.2 NEED FOR PROPHYLACTIC ANTIBIOTIC: ICD-10-CM

## 2021-07-12 DIAGNOSIS — Z96.643 HISTORY OF BILATERAL HIP REPLACEMENTS: ICD-10-CM

## 2021-07-12 PROCEDURE — 99214 OFFICE O/P EST MOD 30 MIN: CPT | Performed by: FAMILY MEDICINE

## 2021-07-12 RX ORDER — AMOXICILLIN 500 MG/1
2000 CAPSULE ORAL ONCE
Qty: 4 CAPSULE | Refills: 1 | Status: SHIPPED | OUTPATIENT
Start: 2021-07-12 | End: 2021-07-12

## 2021-07-12 ASSESSMENT — MIFFLIN-ST. JEOR: SCORE: 1804.12

## 2021-07-12 NOTE — PATIENT INSTRUCTIONS
Continue all medications unchanged.    Be consistent with low salt, low trans fat and low saturated fat diet.  Eat food rich in omega-3-fatty acids as you tolerate. (salmon, olive oil)  Eat 5 cups of vegetables, fruits and whole grains per day.  Limit starchy food (white rice, white bread, white pasta, white potatoes) to less than a cup per meal.  Minimize sweets, junk food and fastfood. Limit soda beverages to one serving per day; best to avoid it altogether though.    Exercise: moderate intensity sustained for at least 30 mins per episode, goal of 150 mins per week at least  Combine cardiovascular and resistance exercises.  These exercise recommendations are in addition to your daily activity at work or home.

## 2021-07-12 NOTE — PROGRESS NOTES
Assessment & Plan     Lightheadedness  No current symptom. Resolved.  Question if TIA but patient said neurologist did not think so, and has stopped clopidogrel.  Continue aspirin daily.  Continue all other meds.  Return precautions discussed and given to patient.     History of bilateral hip replacements  Patient was advised of indications for prophylaxis prior to dental procedures.  Patient was advised to take antibiotics only as directed.  - amoxicillin (AMOXIL) 500 MG capsule  Dispense: 4 capsule; Refill: 1    Need for prophylactic antibiotic  - amoxicillin (AMOXIL) 500 MG capsule  Dispense: 4 capsule; Refill: 1      Patient Instructions   Continue all medications unchanged.    Be consistent with low salt, low trans fat and low saturated fat diet.  Eat food rich in omega-3-fatty acids as you tolerate. (salmon, olive oil)  Eat 5 cups of vegetables, fruits and whole grains per day.  Limit starchy food (white rice, white bread, white pasta, white potatoes) to less than a cup per meal.  Minimize sweets, junk food and fastfood. Limit soda beverages to one serving per day; best to avoid it altogether though.    Exercise: moderate intensity sustained for at least 30 mins per episode, goal of 150 mins per week at least  Combine cardiovascular and resistance exercises.  These exercise recommendations are in addition to your daily activity at work or home.          Return if symptoms worsen or fail to improve.    Mo Granados MD  [facility]    Jareth KRUGER is a 65 year old who presents for the following health issues:  Chief Complaint   Patient presents with     ER F/U     Pt here for post e/r f/u.       [facility]     ED/UC Followup:    Facility:  [facility]  Date of visit: 7/1/21  Reason for visit: lightheaded, dizzy, muscle aches  Current Status: Pt is doing good, no concerns  Patient was told by the ER team that he likley was dehydrated.   Review of [facility] Care Everywhere showed diagnosis of  possible TIA.   Pt had mri, ct done 7/1. - no acute findings.  Pt also had echo and heart monitor 7/8.    Event monitor still ongoing - results pending.    Echocardiogram:   Interpretation Summary     The visual ejection fraction is 55-60%.  The left ventricle is normal in structure, function and size.  There is normal left ventricular wall thickness.  No regional wall motion abnormalities noted.  The right ventricle is normal in structure, function and size.  A contrast injection (Bubble Study) was performed that was negative for flow  across the interatrial septum.  There is no pericardial effusion.    Patient denies subsequent episodes since ER visit.    Patient is already on antihypertensive, statin and aspirin.    Patient said his previous ortho who did his hip replacement but now has retired recommended abx ppx prior to invasive procedures to prevent infection.  Used to get standing Rx for amoxicillin for dental procedures.  Patient will have cleaning done in a few weeks.    Patient Active Problem List   Diagnosis     Benign neoplasm of colon     Coronary artery calcification seen on CAT scan     Diabetes mellitus, type 2 (H)     Genital herpes     Hyperlipemia     Hypertension     Right thyroid nodule     Lightheadedness     Past Surgical History:   Procedure Laterality Date     JOINT REPLACEMENT      left and right total hip replacement      TOTAL HIP ARTHROPLASTY Left 05/17/2012     TOTAL HIP ARTHROPLASTY Right        Social History     Tobacco Use     Smoking status: Former Smoker     Years: 10.00     Types: Cigarettes     Smokeless tobacco: Never Used   Substance Use Topics     Alcohol use: Not Currently     Family History   Problem Relation Age of Onset     Diabetes Paternal Aunt      Arthritis Maternal Aunt         Rheumatoid     Hypertension Mother      Heart Disease Mother          Current Outpatient Medications   Medication Sig Dispense Refill     acyclovir (ZOVIRAX) 400 MG tablet Take 1 tablet (400  "mg) by mouth 2 times daily 180 tablet 3     amoxicillin (AMOXIL) 500 MG capsule Take 4 capsules (2,000 mg) by mouth once for 1 dose 60 minutes before invasive dental procedure 4 capsule 1     aspirin (ASA) 81 MG EC tablet Take 81 mg by mouth       atorvastatin (LIPITOR) 40 MG tablet Take 1 tablet (40 mg) by mouth daily 90 tablet 3     Calcium Carbonate (CALCIUM 600 PO) Take by mouth daily       chlorthalidone (HYGROTON) 25 MG tablet Take 0.5 tablets (12.5 mg) by mouth daily 45 tablet 1     cholecalciferol (VITAMIN D3) 25 mcg (1000 units) capsule Take 1,000 Units by mouth       Cyanocobalamin (VITAMIN B12 PO)        lisinopril (ZESTRIL) 20 MG tablet Take 1 tablet (20 mg) by mouth daily 90 tablet 3     Allergies   Allergen Reactions     Metformin Itching     Perianal itch and some erythema and skin itch on arms etc.      Review of Systems   Constitutional, HEENT, cardiovascular, pulmonary, GI, , musculoskeletal, neuro, skin, endocrine and psych systems are negative, except as otherwise noted.      Objective    /72   Pulse 71   Temp 98.3  F (36.8  C) (Tympanic)   Resp 14   Ht 1.803 m (5' 11\")   Wt 100.7 kg (222 lb)   SpO2 96%   BMI 30.96 kg/m    Body mass index is 30.96 kg/m .  Physical Exam   GENERAL: alert and no distress, ambulatory w/o assist  NECK: no tenderness, no adenopathy,  Thyroid not enlarged  RESP: lungs clear to auscultation - no rales, no rhonchi, no wheezes  CV: regular rates and rhythm, no murmur  MS: no edema  SKIN: no suspicious lesions, no rashes  NEURO: Patient is A and O X 3; Cranial nerves 2-12 intact;  Strength 5/5 all extremities; DTR: ++ x 4; Sensory no gross deficit; no tremors No problems with motor coordination  ABD:  nontender    No results found for any visits on 07/12/21.          "

## 2021-08-11 NOTE — PROGRESS NOTES
"Jules comes in today for evaluation of a transient \"burning\" sensation in the arms.  24 hours ago he was out eating at a restaurant and had the sudden onset of some pain in his neck along with a burning sensation in both of his arms.  This lasted for 3 minutes and then went away on its own.  He denies any chest pain or shortness of breath associated with this.  There was no trauma that brought his symptoms on.  He has been asymptomatic since.  He has never had anything like this prior to this.  He was concerned and thus came in to see us today.  He is otherwise feeling well.    Objective: Vital signs are as per the EMR.  In general the patient is alert pleasant and in no acute distress.  He appears healthy.    Neurologic: Strength is 5 out of 5 to resisted shoulder abduction, arm flexion, and arm extension bilaterally.  Neck range of motion is full to flexion extension, bilateral sidebending, and bilateral rotation.  Biceps, triceps, and brachioradialis reflexes are 2+ and normal bilaterally.    Cardiovascular: S1-S2 regular rate and rhythm murmurs gallops rubs    Lungs are clear.    Assessment and plan: Bilateral arm pain, transient, of unknown etiology.  I reassured him that I did not see anything that was overly concerning at this point, especially with the lack of recurrence of this.  I told him that he should continue to monitor things and if this came back we would evaluate it likely using a MRI of the cervical spine.  He has an appointment to establish care with us in 1 week.  " Niacinamide Pregnancy And Lactation Text: These medications are considered safe during pregnancy.

## 2021-08-29 DIAGNOSIS — I10 BENIGN ESSENTIAL HYPERTENSION: ICD-10-CM

## 2021-08-31 RX ORDER — CHLORTHALIDONE 25 MG/1
TABLET ORAL
Qty: 45 TABLET | Refills: 1 | Status: SHIPPED | OUTPATIENT
Start: 2021-08-31 | End: 2021-12-07

## 2021-08-31 NOTE — TELEPHONE ENCOUNTER
"Prescription approved per [facility] Refill Protocol.    Requested Prescriptions   Pending Prescriptions Disp Refills     chlorthalidone (HYGROTON) 25 MG tablet [Pharmacy Med Name: CHLORTHALID  TAB 25MG] 45 tablet 1     Sig: TAKE 1/2 TABLET DAILY       Diuretics (Including Combos) Protocol Passed - 8/29/2021 10:19 AM        Passed - Blood pressure under 140/90 in past 12 months     BP Readings from Last 3 Encounters:   07/12/21 124/72   07/01/21 133/86   10/15/20 138/80                 Passed - Recent (12 mo) or future (30 days) visit within the authorizing provider's specialty     Patient has had an office visit with the authorizing provider or a provider within the authorizing providers department within the previous 12 mos or has a future within next 30 days. See \"Patient Info\" tab in inbasket, or \"Choose Columns\" in Meds & Orders section of the refill encounter.              Passed - Medication is active on med list        Passed - Patient is age 18 or older        Passed - Normal serum creatinine on file in past 12 months     Recent Labs   Lab Test 07/01/21  1330   CR 1.15              Passed - Normal serum potassium on file in past 12 months     Recent Labs   Lab Test 07/01/21  1330   POTASSIUM 4.3                    Passed - Normal serum sodium on file in past 12 months     Recent Labs   Lab Test 07/01/21  1330                      "

## 2021-10-05 ENCOUNTER — MYC MEDICAL ADVICE (OUTPATIENT)
Dept: FAMILY MEDICINE | Facility: CLINIC | Age: 66
End: 2021-10-05

## 2021-10-06 NOTE — TELEPHONE ENCOUNTER
Dr. Granados,    Please see Mobileum message re: heart monitor results.    Mary Beth Castillo RN  [facility]

## 2021-10-23 ENCOUNTER — HEALTH MAINTENANCE LETTER (OUTPATIENT)
Age: 66
End: 2021-10-23

## 2021-11-18 ENCOUNTER — TRANSFERRED RECORDS (OUTPATIENT)
Dept: HEALTH INFORMATION MANAGEMENT | Facility: CLINIC | Age: 66
End: 2021-11-18
Payer: MEDICARE

## 2021-11-26 DIAGNOSIS — A60.01 HERPES SIMPLEX INFECTION OF PENIS: ICD-10-CM

## 2021-11-26 DIAGNOSIS — E11.9 TYPE 2 DIABETES MELLITUS WITHOUT COMPLICATION, WITHOUT LONG-TERM CURRENT USE OF INSULIN (H): ICD-10-CM

## 2021-11-26 DIAGNOSIS — E78.2 MIXED HYPERLIPIDEMIA: ICD-10-CM

## 2021-11-26 NOTE — TELEPHONE ENCOUNTER
"Former patient of Granados & has not established care with another provider.  Please assign refill request to covering provider per clinic standard process.    Routing refill request to provider for review/approval because:  No PCP  Patient needs to be seen because it has been more than 1 year since last office visit.    Last Written Prescription Date:  10/15/20  Last Fill Quantity: 90/180,  # refills: 3   Last office visit provider:  12/23/2019     Requested Prescriptions   Pending Prescriptions Disp Refills     atorvastatin (LIPITOR) 40 MG tablet [Pharmacy Med Name: ATORVASTATIN TAB 40MG] 90 tablet 3     Sig: TAKE 1 TABLET DAILY       Statins Protocol Failed - 11/26/2021  7:15 AM        Failed - Recent (12 mo) or future (30 days) visit within the authorizing provider's specialty     Patient has had an office visit with the authorizing provider or a provider within the authorizing providers department within the previous 12 mos or has a future within next 30 days. See \"Patient Info\" tab in inbasket, or \"Choose Columns\" in Meds & Orders section of the refill encounter.              Passed - LDL on file in past 12 months     Recent Labs   Lab Test 07/01/21  1330   LDL 47             Passed - No abnormal creatine kinase in past 12 months     No lab results found.             Passed - Medication is active on med list        Passed - Patient is age 18 or older           acyclovir (ZOVIRAX) 400 MG tablet [Pharmacy Med Name: ACYCLOVIR TAB 400MG] 180 tablet 3     Sig: TAKE 1 TABLET TWICE A DAY       Antivirals for Herpes Protocol Failed - 11/26/2021  7:15 AM        Failed - Recent (12 mo) or future (30 days) visit within the authorizing provider's specialty     Patient has had an office visit with the authorizing provider or a provider within the authorizing providers department within the previous 12 mos or has a future within next 30 days. See \"Patient Info\" tab in inbasket, or \"Choose Columns\" in Meds & Orders section of " the refill encounter.              Passed - Patient is age 12 or older        Passed - Medication is active on med list        Passed - Normal serum creatinine on file in past 12 months     Recent Labs   Lab Test 07/01/21  1330   CR 1.15       Ok to refill medication if creatinine is low               Pelon Ramirez RN 11/26/21 12:00 PM

## 2021-11-29 RX ORDER — ACYCLOVIR 400 MG/1
TABLET ORAL
Qty: 180 TABLET | Refills: 3 | Status: SHIPPED | OUTPATIENT
Start: 2021-11-29 | End: 2022-11-04

## 2021-11-29 RX ORDER — ATORVASTATIN CALCIUM 40 MG/1
TABLET, FILM COATED ORAL
Qty: 90 TABLET | Refills: 3 | Status: SHIPPED | OUTPATIENT
Start: 2021-11-29 | End: 2023-02-09

## 2021-11-29 RX ORDER — ATORVASTATIN CALCIUM 40 MG/1
TABLET, FILM COATED ORAL
Qty: 90 TABLET | Refills: 2 | Status: SHIPPED | OUTPATIENT
Start: 2021-11-29 | End: 2021-12-07

## 2021-12-07 ENCOUNTER — OFFICE VISIT (OUTPATIENT)
Dept: FAMILY MEDICINE | Facility: CLINIC | Age: 66
End: 2021-12-07
Payer: MEDICARE

## 2021-12-07 VITALS
OXYGEN SATURATION: 98 % | TEMPERATURE: 96.7 F | WEIGHT: 227.8 LBS | SYSTOLIC BLOOD PRESSURE: 130 MMHG | HEART RATE: 66 BPM | BODY MASS INDEX: 31.89 KG/M2 | RESPIRATION RATE: 14 BRPM | DIASTOLIC BLOOD PRESSURE: 82 MMHG | HEIGHT: 71 IN

## 2021-12-07 DIAGNOSIS — I10 BENIGN ESSENTIAL HYPERTENSION: ICD-10-CM

## 2021-12-07 DIAGNOSIS — Z00.00 ENCOUNTER FOR MEDICARE ANNUAL WELLNESS EXAM: Primary | ICD-10-CM

## 2021-12-07 DIAGNOSIS — Z87.891 PERSONAL HISTORY OF TOBACCO USE, PRESENTING HAZARDS TO HEALTH: ICD-10-CM

## 2021-12-07 DIAGNOSIS — Z12.5 SCREENING FOR PROSTATE CANCER: ICD-10-CM

## 2021-12-07 DIAGNOSIS — N52.9 ERECTILE DYSFUNCTION, UNSPECIFIED ERECTILE DYSFUNCTION TYPE: ICD-10-CM

## 2021-12-07 DIAGNOSIS — E11.9 TYPE 2 DIABETES MELLITUS WITHOUT COMPLICATION, WITHOUT LONG-TERM CURRENT USE OF INSULIN (H): ICD-10-CM

## 2021-12-07 DIAGNOSIS — N48.6 PEYRONIE'S DISEASE: ICD-10-CM

## 2021-12-07 DIAGNOSIS — E78.2 MIXED HYPERLIPIDEMIA: ICD-10-CM

## 2021-12-07 LAB
CREAT UR-MCNC: 104 MG/DL
MICROALBUMIN UR-MCNC: 6 MG/L
MICROALBUMIN/CREAT UR: 5.77 MG/G CR (ref 0–17)
PSA SERPL-MCNC: 0.41 UG/L (ref 0–4)

## 2021-12-07 PROCEDURE — G0103 PSA SCREENING: HCPCS | Performed by: FAMILY MEDICINE

## 2021-12-07 PROCEDURE — 84403 ASSAY OF TOTAL TESTOSTERONE: CPT | Performed by: FAMILY MEDICINE

## 2021-12-07 PROCEDURE — 99207 PR FOOT EXAM NO CHARGE: CPT | Performed by: FAMILY MEDICINE

## 2021-12-07 PROCEDURE — G0438 PPPS, INITIAL VISIT: HCPCS | Performed by: FAMILY MEDICINE

## 2021-12-07 PROCEDURE — 99214 OFFICE O/P EST MOD 30 MIN: CPT | Mod: 25 | Performed by: FAMILY MEDICINE

## 2021-12-07 PROCEDURE — 82043 UR ALBUMIN QUANTITATIVE: CPT | Performed by: FAMILY MEDICINE

## 2021-12-07 PROCEDURE — 36415 COLL VENOUS BLD VENIPUNCTURE: CPT | Performed by: FAMILY MEDICINE

## 2021-12-07 RX ORDER — CHLORTHALIDONE 25 MG/1
12.5 TABLET ORAL DAILY
Qty: 45 TABLET | Refills: 3 | Status: SHIPPED | OUTPATIENT
Start: 2021-12-07 | End: 2023-02-13

## 2021-12-07 ASSESSMENT — ENCOUNTER SYMPTOMS
COUGH: 0
FEVER: 0
PALPITATIONS: 0
NAUSEA: 0
ARTHRALGIAS: 0
ABDOMINAL PAIN: 0
HEMATOCHEZIA: 0
MYALGIAS: 0
DIZZINESS: 0
FREQUENCY: 0
EYE PAIN: 0
PARESTHESIAS: 0
SHORTNESS OF BREATH: 0
JOINT SWELLING: 0
CHILLS: 0
WEAKNESS: 0
SORE THROAT: 0
NERVOUS/ANXIOUS: 0
HEMATURIA: 0
DIARRHEA: 0
DYSURIA: 0
CONSTIPATION: 0
HEADACHES: 0
HEARTBURN: 0

## 2021-12-07 ASSESSMENT — ACTIVITIES OF DAILY LIVING (ADL): CURRENT_FUNCTION: NO ASSISTANCE NEEDED

## 2021-12-07 ASSESSMENT — MIFFLIN-ST. JEOR: SCORE: 1825.42

## 2021-12-07 NOTE — PROGRESS NOTES
"SUBJECTIVE:   Jules Cheng is a 66 year old male who presents for Preventive Visit.      Patient has been advised of split billing requirements and indicates understanding: Yes   Are you in the first 12 months of your Medicare coverage?  No    Healthy Habits:     In general, how would you rate your overall health?  Good    Frequency of exercise:  2-3 days/week    Duration of exercise:  30-45 minutes    Do you usually eat at least 4 servings of fruit and vegetables a day, include whole grains    & fiber and avoid regularly eating high fat or \"junk\" foods?  No    Taking medications regularly:  Yes    Medication side effects:  None    Ability to successfully perform activities of daily living:  No assistance needed    Home Safety:  No safety concerns identified    Hearing Impairment:  Difficulty following a conversation in a noisy restaurant or crowded room, difficulty following dialogue in the theater, difficult to understand a speaker at a public meeting or [employer] service, need to ask people to speak up or repeat themselves, find that men's voices are easier to understand than woman's and difficulty understanding soft or whispered speech    In the past 6 months, have you been bothered by leaking of urine?  No    In general, how would you rate your overall mental or emotional health?  Excellent      PHQ-2 Total Score: 0    Additional concerns today:  No    Patient denies hearing to be any worse than 3 yrs ago when he had a hearing test.    Reports intermittent inadequate erection. No pain. No difficulty urinating.  Reports change in penile curvature for the last year or so.  Reports he does get a full erection in the mornings.    Do you feel safe in your environment? Yes    Have you ever done Advance Care Planning? (For example, a Health Directive, POLST, or a discussion with a medical provider or your loved ones about your wishes): Yes, advance care planning is on file.  Fall risk  Fallen 2 or more times in " the past year?: No  Any fall with injury in the past year?: No    Cognitive Screening   1) Repeat 3 items (Leader, Season, Table)    2) Clock draw: NORMAL  3) 3 item recall: Recalls 3 objects  Results: 3 items recalled: COGNITIVE IMPAIRMENT LESS LIKELY    Mini-CogTM Copyright AMBER Bray. Licensed by the author for use in [facility]; reprinted with permission (subha@[facility]). All rights reserved.      Do you have sleep apnea, excessive snoring or daytime drowsiness?: no    Reviewed and updated as needed this visit by clinical staff  Tobacco  Allergies  Meds   Med Hx  Surg Hx  Fam Hx  Soc Hx       Reviewed and updated as needed this visit by Provider               Social History     Tobacco Use     Smoking status: Former Smoker     Years: 10.00     Types: Cigarettes     Smokeless tobacco: Never Used   Substance Use Topics     Alcohol use: Not Currently     If you drink alcohol do you typically have >3 drinks per day or >7 drinks per week? No    Alcohol Use 12/7/2021   Prescreen: >3 drinks/day or >7 drinks/week? Not Applicable   Prescreen: >3 drinks/day or >7 drinks/week? -       Medication Followup of chlporthalidone    Taking Medication as prescribed: yes    Side Effects:  None    Medication Helping Symptoms:  yes       Current providers sharing in care for this patient include:   Patient Care Team:  Mo Granados MD as PCP - General (Family Medicine)  Mo Granados MD as Assigned PCP    The following health maintenance items are reviewed in Epic and correct as of today:  Health Maintenance Due   Topic Date Due     ANNUAL REVIEW OF  ORDERS  Never done     LUNG CANCER SCREENING  Never done     ZOSTER IMMUNIZATION (2 of 2) 03/18/2016     AORTIC ANEURYSM SCREENING (SYSTEM ASSIGNED)  Never done     MICROALBUMIN  10/15/2021     DIABETIC FOOT EXAM  10/15/2021     FALL RISK ASSESSMENT  10/15/2021     Patient Active Problem List   Diagnosis     Benign neoplasm of colon     Coronary  artery calcification seen on CAT scan     Diabetes mellitus, type 2 (H)     Genital herpes     Hyperlipemia     Hypertension     Right thyroid nodule     Lightheadedness     Past Surgical History:   Procedure Laterality Date     JOINT REPLACEMENT      left and right total hip replacement      TOTAL HIP ARTHROPLASTY Left 05/17/2012     TOTAL HIP ARTHROPLASTY Right        Social History     Tobacco Use     Smoking status: Former Smoker     Years: 10.00     Types: Cigarettes     Smokeless tobacco: Never Used   Substance Use Topics     Alcohol use: Not Currently     Family History   Problem Relation Age of Onset     Diabetes Paternal Aunt      Arthritis Maternal Aunt         Rheumatoid     Hypertension Mother      Heart Disease Mother          Current Outpatient Medications   Medication Sig Dispense Refill     acyclovir (ZOVIRAX) 400 MG tablet TAKE 1 TABLET TWICE A  tablet 3     aspirin (ASA) 81 MG EC tablet Take 81 mg by mouth       atorvastatin (LIPITOR) 40 MG tablet TAKE 1 TABLET DAILY 90 tablet 3     Calcium Carbonate (CALCIUM 600 PO) Take by mouth daily       chlorthalidone (HYGROTON) 25 MG tablet TAKE 1/2 TABLET DAILY 45 tablet 1     cholecalciferol (VITAMIN D3) 25 mcg (1000 units) capsule Take 1,000 Units by mouth       Cyanocobalamin (VITAMIN B12 PO)        lisinopril (ZESTRIL) 20 MG tablet TAKE 1 TABLET DAILY 90 tablet 3     Allergies   Allergen Reactions     Metformin Itching     Perianal itch and some erythema and skin itch on arms etc.      Pneumonia Vaccine: UTD    Review of Systems   Constitutional: Negative for chills and fever.   HENT: Positive for hearing loss. Negative for congestion, ear pain and sore throat.    Eyes: Negative for pain and visual disturbance.   Respiratory: Negative for cough and shortness of breath.    Cardiovascular: Negative for chest pain, palpitations and peripheral edema.   Gastrointestinal: Negative for abdominal pain, constipation, diarrhea, heartburn, hematochezia and  "nausea.   Genitourinary: Positive for impotence and urgency. Negative for dysuria, frequency, genital sores, hematuria and penile discharge.   Musculoskeletal: Negative for arthralgias, joint swelling and myalgias.   Skin: Negative for rash.   Neurological: Negative for dizziness, weakness, headaches and paresthesias.   Psychiatric/Behavioral: Negative for mood changes. The patient is not nervous/anxious.      States urgency in urination is only sporadic.    OBJECTIVE:   /82   Pulse 66   Temp (!) 96.7  F (35.9  C) (Tympanic)   Resp 14   Ht 1.803 m (5' 11\")   Wt 103.3 kg (227 lb 12.8 oz)   SpO2 98%   BMI 31.77 kg/m   Estimated body mass index is 31.77 kg/m  as calculated from the following:    Height as of this encounter: 1.803 m (5' 11\").    Weight as of this encounter: 103.3 kg (227 lb 12.8 oz).  Physical Exam  GENERAL APPEARANCE: obese, alert and no distress  EYES: pink conj, no icterus, PERRL, EOMI  HENT: ear canals and TM's normal, nose and mouth without ulcers or lesions, oropharynx clear and oral mucous membranes moist  NECK: no adenopathy, no asymmetry, masses, or scars and thyroid normal to palpation  RESP: lungs clear to auscultation - no rales, rhonchi or wheezes  CV: regular rates and rhythm, normal S1 S2, no S3 or S4, no murmur, click or rub, no peripheral edema and peripheral pulses strong  ABDOMEN: soft, nontender, no hepatosplenomegaly, no masses and bowel sounds normal  RECTAL: deferred  GENITALIA: no gross deformity when flaccid; no urethral discharge; no palpable abnormality on shaft  MS: no musculoskeletal defects are noted and gait is age appropriate without ataxia  SKIN: no suspicious lesions or rashes  NEURO: Normal strength and tone, sensory exam grossly normal, mentation intact and speech normal    Diagnostic Test Results:  none     ASSESSMENT / PLAN:   Jules was seen today for wellness visit.    Diagnoses and all orders for this visit:    Encounter for Medicare annual wellness " "exam  Patient was advised on recommended screening and preventive health recommendations.  He verbalized understanding and agreed to the plans below.    Benign essential hypertension  -     chlorthalidone (HYGROTON) 25 MG tablet; Take 0.5 tablets (12.5 mg) by mouth daily  -     OFFICE/OUTPT VISIT,EST,LEVL IV  Controlled.  Low salt, low fat diet.   Exercise as tolerated.  Take meds as prescribed; call if with side effects.     Erectile dysfunction, unspecified erectile dysfunction type  -     Testosterone total; Future  -     Adult Urology Referral; Future  -     OFFICE/OUTPT VISIT,EST,LEVL IV  -     Testosterone total  Likely due to peyronie's disease. However, will screen for hypogonadism as well as patient reports \"lack of libido\".  Consider erectile dysfunction med as appropriate after urology consult.    Peyronie's disease  -     Adult Urology Referral; Future  -     OFFICE/OUTPT VISIT,EST,LEVL IV  Discussed pathology of condition.  Referred to urology for management.    Mixed hyperlipidemia  Reinforced heart healthy lifestyle.   On statin    Type 2 diabetes mellitus without complication, without long-term current use of insulin (H)  -     Albumin Random Urine Quantitative with Creat Ratio; Future  -     FOOT EXAM  -     Albumin Random Urine Quantitative with Creat Ratio  Controlled on diet only.  Reinforced carbohydrate control.  Regular exercise reinforced.  Regular foot care, annual eye exam.  Flu vaccine every year.    Screening for prostate cancer  -     Prostate Specific Antigen Screen; Future  -     Prostate Specific Antigen Screen    Personal history of tobacco use, presenting hazards to health  -     [facility] Medicare AAA Screening; Future    Other orders  -     REVIEW OF HEALTH MAINTENANCE PROTOCOL ORDERS        Patient has been advised of split billing requirements and indicates understanding: Yes  COUNSELING:  Reviewed preventive health counseling, as reflected in patient instructions    Estimated " "body mass index is 31.77 kg/m  as calculated from the following:    Height as of this encounter: 1.803 m (5' 11\").    Weight as of this encounter: 103.3 kg (227 lb 12.8 oz).    Weight management plan: Discussed healthy diet and exercise guidelines    He reports that he has quit smoking. His smoking use included cigarettes. He quit after 10.00 years of use. He has never used smokeless tobacco.      Appropriate preventive services were discussed with this patient, including applicable screening as appropriate for cardiovascular disease, diabetes, osteopenia/osteoporosis, and glaucoma.  As appropriate for age/gender, discussed screening for colorectal cancer, prostate cancer, breast cancer, and cervical cancer. Checklist reviewing preventive services available has been given to the patient.    Reviewed patients plan of care and provided an AVS. The Basic Care Plan (routine screening as documented in Health Maintenance) and Complex Care Plan (for patients with higher acuity and needing more deliberate coordination of services) for Jules meets the Care Plan requirement. This Care Plan has been established and reviewed with the Patient.    Counseling Resources:  ATP IV Guidelines  Pooled Cohorts Equation Calculator  Breast Cancer Risk Calculator  Breast Cancer: Medication to Reduce Risk  FRAX Risk Assessment  ICSI Preventive Guidelines  Dietary Guidelines for Americans, 2010  [employer]'s MyPlate  ASA Prophylaxis  Lung CA Screening    Mo Granados MD  [facility]    Identified Health Risks:  "

## 2021-12-07 NOTE — PATIENT INSTRUCTIONS
You will be contacted in 1-2 days for results of your lab tests.    You may get the Shingrix vaccine for shingles if you desire, and after you verify with insurance how they cover the vaccine.    Schedule urology consult for the change in penile curvature and for erectile dysfunction.    To schedule the ultrasound to screen for abdominal aortic aneurysm, call [phone].    Be consistent with low salt, low trans fat and low saturated fat diet.  Eat food rich in omega-3-fatty acids as you tolerate. (salmon, olive oil)  Eat 5 cups of vegetables, fruits and whole grains per day.  Limit starchy food (white rice, white bread, white pasta, white potatoes) to less than a cup per meal.  Minimize sweets, junk food and fastfood. Limit soda beverages to one serving per day; best to avoid it altogether though.    Exercise: moderate intensity sustained for at least 30 mins per episode, goal of 150 mins per week at least  Combine cardiovascular and resistance exercises.  These exercise recommendations are in addition to your daily activity at work or home.  Work on losing weight.    Regular foot care; don't walk barefoot  Annual eye exam.  Please request your eye doctor to furnish a copy of the eye exam report to the clinic to be placed in your record.  Flu vaccine by the end of October yearly.  Be sure to update any other recommended vaccinations for diabetics.    Diabetes labs to be repeated in summer of 2022.    Preventative Care Visits include: Yearly physicals, Well-child visits, Welcome to Medicare visits, & Medicare yearly wellness exams.    The purpose of these visits is to discuss your medical history and prevent health problems before you are sick.  You may need to pay a copay, coinsurance or deductible if your visit today includes testing or treating for a new or existing condition.    Additional charges may be incurred for today's visit. If you have questions about what your insurance plan covers, please contact  your Insurance Company's member service department.  If you have questions specific to a bill you have already received from [facility], please contact the [facility] at [phone].     Patient Education   Personalized Prevention Plan  You are due for the preventive services outlined below.  Your care team is available to assist you in scheduling these services.  If you have already completed any of these items, please share that information with your care team to update in your medical record.  Health Maintenance Due   Topic Date Due     ANNUAL REVIEW OF HM ORDERS  Never done     LUNG CANCER SCREENING  Never done     Zoster (Shingles) Vaccine (2 of 2) 03/18/2016     AORTIC ANEURYSM SCREENING (SYSTEM ASSIGNED)  Never done     Kidney Microalbumin Urine Test  10/15/2021     Diabetic Foot Exam  10/15/2021     FALL RISK ASSESSMENT  10/15/2021     Preventive Health Recommendations  See your health care provider every year to    Review health changes.     Discuss preventive care.      Review your medicines if your doctor has prescribed any.    Talk with your health care provider about whether you should have a test to screen for prostate cancer (PSA).    Every 3 years, have a diabetes test (fasting glucose). If you are at risk for diabetes, you should have this test more often.    Every 5 years, have a cholesterol test. Have this test more often if you are at risk for high cholesterol or heart disease.     Every 10 years, have a colonoscopy. Or, have a yearly FIT test (stool test). These exams will check for colon cancer.    Talk to with your health care provider about screening for Abdominal Aortic Aneurysm if you have a family history of AAA or have a history of smoking.    Shots:     Get a flu shot each year.     Get a tetanus shot every 10 years.     Talk to your doctor about your pneumonia vaccines. There are now two you should receive - Pneumovax (PPSV 23) and Prevnar (PCV 13).    Talk to your  pharmacist about a shingles vaccine.     Talk to your doctor about the hepatitis B vaccine.    Nutrition:     Eat at least 5 servings of fruits and vegetables each day.     Eat whole-grain bread, whole-wheat pasta and brown rice instead of white grains and rice.     Get adequate Calcium and Vitamin D.     Lifestyle    Exercise for at least 150 minutes a week (30 minutes a day, 5 days a week). This will help you control your weight and prevent disease.     Limit alcohol to one drink per day.     No smoking.     Wear sunscreen to prevent skin cancer.     See your dentist every six months for an exam and cleaning.     See your eye doctor every 1 to 2 years to screen for conditions such as glaucoma, macular degeneration and cataracts.    Personalized Prevention Plan  You are due for the preventive services outlined below.  Your care team is available to assist you in scheduling these services.  If you have already completed any of these items, please share that information with your care team to update in your medical record.  Health Maintenance   Topic Date Due     ANNUAL REVIEW OF HM ORDERS  Never done     LUNG CANCER SCREENING  Never done     ZOSTER IMMUNIZATION (2 of 2) 03/18/2016     AORTIC ANEURYSM SCREENING (SYSTEM ASSIGNED)  Never done     MICROALBUMIN  10/15/2021     DIABETIC FOOT EXAM  10/15/2021     FALL RISK ASSESSMENT  10/15/2021     A1C  01/01/2022     BMP  07/01/2022     LIPID  07/01/2022     MEDICARE ANNUAL WELLNESS VISIT  12/07/2022     DTAP/TDAP/TD IMMUNIZATION (3 - Td or Tdap) 12/30/2025     ADVANCE CARE PLANNING  12/07/2026     COLORECTAL CANCER SCREENING  09/19/2029     HEPATITIS C SCREENING  Completed     PHQ-2  Completed     INFLUENZA VACCINE  Completed     Pneumococcal Vaccine: 65+ Years  Completed     COVID-19 Vaccine  Completed     IPV IMMUNIZATION  Aged Out     MENINGITIS IMMUNIZATION  Aged Out     EYE EXAM  Discontinued       Understanding USDA MyPlate  The USDA has guidelines to help you  make healthy food choices. These are called MyPlate. MyPlate shows the food groups that make up healthy meals using the image of a place setting. Before you eat, think about the healthiest choices for what to put on your plate or in your cup or bowl. To learn more about building a healthy plate, visit www.choosemyplate.gov.    The food groups    Fruits. Any fruit or 100% fruit juice counts as part of the Fruit Group. Fruits may be fresh, canned, frozen, or dried, and may be whole, cut-up, or pureed. Make 1/2 of your plate fruits and vegetables.    Vegetables. Any vegetable or 100% vegetable juice counts as a member of the Vegetable Group. Vegetables may be fresh, frozen, canned, or dried. They can be served raw or cooked and may be whole, cut-up, or mashed. Make 1/2 of your plate fruits and vegetables.    Grains. All foods made from grains are part of the Grains Group. These include wheat, rice, oats, cornmeal, and barley. Grains are often used to make foods such as bread, pasta, oatmeal, cereal, tortillas, and grits. Grains should be no more than 1/4 of your plate. At least half of your grains should be whole grains.    Protein. This group includes meat, poultry, seafood, beans and peas, eggs, processed soy products (such as tofu), nuts (including nut butters), and seeds. Make protein choices no more than 1/4 of your plate. Meat and poultry choices should be lean or low fat.    Dairy. The Dairy Group includes all fluid milk products and foods made from milk that contain calcium, such as yogurt and cheese. (Foods that have little calcium, such as cream, butter, and cream cheese, are not part of this group.) Most dairy choices should be low-fat or fat-free.    Oils. Oils aren't a food group, but they do contain essential nutrients. However it's important to watch your intake of oils. These are fats that are liquid at room temperature. They include canola, corn, olive, soybean, vegetable, and sunflower oil. Foods that  are mainly oil include mayonnaise, certain salad dressings, and soft margarines. You likely already get your daily oil allowance from the foods you eat.  Things to limit  Eating healthy also means limiting these things in your diet:       Salt (sodium). Many processed foods have a lot of sodium. To keep sodium intake down, eat fresh vegetables, meats, poultry, and seafood when possible. Purchase low-sodium, reduced-sodium, or no-salt-added food products at the store. And don't add salt to your meals at home. Instead, season them with herbs and spices such as dill, oregano, cumin, and paprika. Or try adding flavor with lemon or lime zest and juice.    Saturated fat. Saturated fats are most often found in animal products such as beef, pork, and chicken. They are often solid at room temperature, such as butter. To reduce your saturated fat intake, choose leaner cuts of meat and poultry. And try healthier cooking methods such as grilling, broiling, roasting, or baking. For a simple lower-fat swap, use plain nonfat yogurt instead of mayonnaise when making potato salad or macaroni salad.    Added sugars. These are sugars added to foods. They are in foods such as ice cream, candy, soda, fruit drinks, sports drinks, energy drinks, cookies, pastries, jams, and syrups. Cut down on added sugars by sharing sweet treats with a family member or friend. You can also choose fruit for dessert, and drink water or other unsweetened beverages.     [employer] last reviewed this educational content on 6/1/2020 2000-2021 [employer]. All rights reserved. This information is not intended as a substitute for professional medical care. Always follow your healthcare professional's instructions.          Signs of Hearing Loss      Hearing much better with one ear can be a sign of hearing loss.   Hearing loss is a problem shared by many people. In fact, it is one of the most common health problems, particularly as people age.  Most people age 65 and older have some hearing loss. By age 80, almost everyone does. Hearing loss often occurs slowly over the years. So you may not realize your hearing has gotten worse.  Have your hearing checked  Call your healthcare provider if you:    Have to strain to hear normal conversation    Have to watch other people s faces very carefully to follow what they re saying    Need to ask people to repeat what they ve said    Often misunderstand what people are saying    Turn the volume of the television or radio up so high that others complain    Feel that people are mumbling when they re talking to you    Find that the effort to hear leaves you feeling tired and irritated    Notice, when using the phone, that you hear better with one ear than the other  [email or web address] last reviewed this educational content on 1/1/2020 2000-2021 [employer]. All rights reserved. This information is not intended as a substitute for professional medical care. Always follow your healthcare professional's instructions.

## 2021-12-09 LAB — TESTOST SERPL-MCNC: 341 NG/DL (ref 240–950)

## 2021-12-21 ENCOUNTER — HOSPITAL ENCOUNTER (OUTPATIENT)
Dept: ULTRASOUND IMAGING | Facility: CLINIC | Age: 66
Discharge: HOME | End: 2021-12-21
Attending: FAMILY MEDICINE | Admitting: FAMILY MEDICINE
Payer: MEDICARE

## 2021-12-21 DIAGNOSIS — Z87.891 PERSONAL HISTORY OF TOBACCO USE, PRESENTING HAZARDS TO HEALTH: ICD-10-CM

## 2021-12-21 PROCEDURE — 76706 US ABDL AORTA SCREEN AAA: CPT

## 2022-02-02 ENCOUNTER — OFFICE VISIT (OUTPATIENT)
Dept: UROLOGY | Facility: CLINIC | Age: 67
End: 2022-02-02
Attending: FAMILY MEDICINE
Payer: MEDICARE

## 2022-02-02 VITALS
SYSTOLIC BLOOD PRESSURE: 148 MMHG | HEIGHT: 71 IN | WEIGHT: 231.5 LBS | DIASTOLIC BLOOD PRESSURE: 78 MMHG | BODY MASS INDEX: 32.41 KG/M2

## 2022-02-02 DIAGNOSIS — N52.9 ERECTILE DYSFUNCTION, UNSPECIFIED ERECTILE DYSFUNCTION TYPE: ICD-10-CM

## 2022-02-02 DIAGNOSIS — N48.6 PEYRONIE'S DISEASE: ICD-10-CM

## 2022-02-02 DIAGNOSIS — I10 HYPERTENSION, UNSPECIFIED TYPE: Primary | ICD-10-CM

## 2022-02-02 PROCEDURE — 99204 OFFICE O/P NEW MOD 45 MIN: CPT | Performed by: UROLOGY

## 2022-02-02 RX ORDER — GABAPENTIN 600 MG/1
600 TABLET ORAL
COMMUNITY
Start: 2020-01-02 | End: 2022-08-10

## 2022-02-02 RX ORDER — SILDENAFIL 100 MG/1
100 TABLET, FILM COATED ORAL DAILY PRN
Qty: 30 TABLET | Refills: 11 | Status: SHIPPED | OUTPATIENT
Start: 2022-02-02

## 2022-02-02 ASSESSMENT — PAIN SCALES - GENERAL: PAINLEVEL: NO PAIN (0)

## 2022-02-02 ASSESSMENT — MIFFLIN-ST. JEOR: SCORE: 1842.21

## 2022-02-02 NOTE — PROGRESS NOTES
SLICK to follow up with Primary Care provider regarding elevated blood pressure.  Ashley Feliz, [facility]

## 2022-02-02 NOTE — PROGRESS NOTES
S: Patient is a 66-year-old male who was requested to be seen by Dr. Mo Granados for a consultation with regard to patient's erectile dysfunction. Patient has had problems with erectile dysfunction for about a year. He has soft erection and cannot maintain it. He has some decrease in libido but recent testosterone was within normal limit. He also noticed a mild curvature of his penis with erections. His risk factors are prediabetes, high blood pressure, history of smoking.  Current Outpatient Medications   Medication Sig Dispense Refill     acyclovir (ZOVIRAX) 400 MG tablet TAKE 1 TABLET TWICE A  tablet 3     aspirin (ASA) 81 MG EC tablet Take 81 mg by mouth       atorvastatin (LIPITOR) 40 MG tablet TAKE 1 TABLET DAILY 90 tablet 3     Calcium Carbonate (CALCIUM 600 PO) Take by mouth daily       chlorthalidone (HYGROTON) 25 MG tablet Take 0.5 tablets (12.5 mg) by mouth daily 45 tablet 3     cholecalciferol (VITAMIN D3) 25 mcg (1000 units) capsule Take 1,000 Units by mouth       Cyanocobalamin (VITAMIN B12 PO)        gabapentin (NEURONTIN) 600 MG tablet Take 600 mg by mouth       lisinopril (ZESTRIL) 20 MG tablet TAKE 1 TABLET DAILY 90 tablet 3     sildenafil (VIAGRA) 100 MG tablet Take 1 tablet (100 mg) by mouth daily as needed (sex) 30 tablet 11     Allergies   Allergen Reactions     Metformin Itching     Perianal itch and some erythema and skin itch on arms etc.      Past Medical History:   Diagnosis Date     Osteoarthrosis, unspecified whether generalized or localized, pelvic region and thigh      Unspecified essential hypertension      Past Surgical History:   Procedure Laterality Date     JOINT REPLACEMENT      left and right total hip replacement      TOTAL HIP ARTHROPLASTY Left 05/17/2012     TOTAL HIP ARTHROPLASTY Right       Family History   Problem Relation Age of Onset     Diabetes Paternal Aunt      Arthritis Maternal Aunt         Rheumatoid     Hypertension Mother      Heart Disease Mother   "    Social History     Socioeconomic History     Marital status:      Spouse name: None     Number of children: None     Years of education: None     Highest education level: None   Occupational History     None   Tobacco Use     Smoking status: Former Smoker     Years: 10.00     Types: Cigarettes     Smokeless tobacco: Never Used   Vaping Use     Vaping Use: Never used   Substance and Sexual Activity     Alcohol use: Not Currently     Drug use: Never     Sexual activity: Yes     Partners: Female     Birth control/protection: Male Surgical   Other Topics Concern     None   Social History Narrative     None     Social Determinants of Health     Financial Resource Strain: Not on file   Food Insecurity: Not on file   Transportation Needs: Not on file   Physical Activity: Not on file   Stress: Not on file   Social Connections: Not on file   Intimate Partner Violence: Not on file   Housing Stability: Not on file       REVIEW OF SYSTEMS  =================  C: NEGATIVE for fever, chills, change in weight  I: NEGATIVE for worrisome rashes, moles or lesions  E/M: NEGATIVE for ear, mouth and throat problems  R: NEGATIVE for significant cough or SHORTNESS OF BREATH  CV:  NEGATIVE for chest pain, palpitations or peripheral edema  GI: NEGATIVE for nausea, abdominal pain, heartburn, or change in bowel habits  NEURO: NEGATIVE numbness/weakness  : see HPI  PSYCH: NEGATIVE depression/anxiety  LYmph: no new enlarged lymph nodes  Ortho: no new trauma/movements      Physical Exam:  BP (!) 148/78 (BP Location: Right arm, Patient Position: Sitting, Cuff Size: Adult Large)   Ht 1.803 m (5' 11\")   Wt 105 kg (231 lb 8 oz)   BMI 32.29 kg/m     Patient is pleasant, in no acute distress, good general condition.  Heart:  negative, PMI normal  Lung: no evidence of respiratory distress    Abdomen: Soft, nondistended, non tender. No masses. No rebound or guarding.   Exam: Penis with mild dorsal plague. Testes no masses but no scrotal " skin lesion.  Skin: Warm and dry.  No redness.  Neuro: grossly normal  Musculaskeletal: moving all extremities  Psych normal mood and affect  Musculoskeletal  moving all extremities  Hematologic/Lymphatic/Immunologic: normal ant/post cervical, axillary, supraclavicular and inguinal nodes    Assessment/Plan:     #1 ED: Etiology discussed. Treatment options discussed. Will start patient on Viagra. Instructions how to use the medication along with side effects discussed.    #2 Vania disease: Reassurance. Treatment options were discussed which include but not limited to Xiaflex, surgical correction.    #3 hypertension: Follow-up with primary doctor.

## 2022-02-12 ENCOUNTER — HEALTH MAINTENANCE LETTER (OUTPATIENT)
Age: 67
End: 2022-02-12

## 2022-04-04 ENCOUNTER — OFFICE VISIT (OUTPATIENT)
Dept: FAMILY MEDICINE | Facility: CLINIC | Age: 67
End: 2022-04-04
Payer: MEDICARE

## 2022-04-04 VITALS
TEMPERATURE: 96.6 F | OXYGEN SATURATION: 96 % | HEART RATE: 82 BPM | BODY MASS INDEX: 32.03 KG/M2 | WEIGHT: 228.8 LBS | HEIGHT: 71 IN | SYSTOLIC BLOOD PRESSURE: 122 MMHG | DIASTOLIC BLOOD PRESSURE: 76 MMHG | RESPIRATION RATE: 16 BRPM

## 2022-04-04 DIAGNOSIS — E11.9 TYPE 2 DIABETES MELLITUS WITHOUT COMPLICATION, WITHOUT LONG-TERM CURRENT USE OF INSULIN (H): ICD-10-CM

## 2022-04-04 DIAGNOSIS — R10.12 LEFT UPPER QUADRANT PAIN: Primary | ICD-10-CM

## 2022-04-04 LAB
ALBUMIN SERPL-MCNC: 3.9 G/DL (ref 3.4–5)
ALP SERPL-CCNC: 81 U/L (ref 40–150)
ALT SERPL W P-5'-P-CCNC: 66 U/L (ref 0–70)
ANION GAP SERPL CALCULATED.3IONS-SCNC: 3 MMOL/L (ref 3–14)
AST SERPL W P-5'-P-CCNC: 34 U/L (ref 0–45)
BASOPHILS # BLD AUTO: 0 10E3/UL (ref 0–0.2)
BASOPHILS NFR BLD AUTO: 0 %
BILIRUB SERPL-MCNC: 0.9 MG/DL (ref 0.2–1.3)
BUN SERPL-MCNC: 14 MG/DL (ref 7–30)
CALCIUM SERPL-MCNC: 8.7 MG/DL (ref 8.5–10.1)
CHLORIDE BLD-SCNC: 102 MMOL/L (ref 94–109)
CHOLEST SERPL-MCNC: 84 MG/DL
CO2 SERPL-SCNC: 29 MMOL/L (ref 20–32)
CREAT SERPL-MCNC: 0.68 MG/DL (ref 0.66–1.25)
CREAT UR-MCNC: 80 MG/DL
EOSINOPHIL # BLD AUTO: 0.1 10E3/UL (ref 0–0.7)
EOSINOPHIL NFR BLD AUTO: 2 %
ERYTHROCYTE [DISTWIDTH] IN BLOOD BY AUTOMATED COUNT: 12 % (ref 10–15)
FASTING STATUS PATIENT QL REPORTED: NO
GFR SERPL CREATININE-BSD FRML MDRD: >90 ML/MIN/1.73M2
GLUCOSE BLD-MCNC: 151 MG/DL (ref 70–99)
HBA1C MFR BLD: 7.2 % (ref 0–5.6)
HCT VFR BLD AUTO: 42.7 % (ref 40–53)
HDLC SERPL-MCNC: 28 MG/DL
HGB BLD-MCNC: 14.4 G/DL (ref 13.3–17.7)
LDLC SERPL CALC-MCNC: 29 MG/DL
LIPASE SERPL-CCNC: 440 U/L (ref 73–393)
LYMPHOCYTES # BLD AUTO: 2.9 10E3/UL (ref 0.8–5.3)
LYMPHOCYTES NFR BLD AUTO: 31 %
MCH RBC QN AUTO: 33.1 PG (ref 26.5–33)
MCHC RBC AUTO-ENTMCNC: 33.7 G/DL (ref 31.5–36.5)
MCV RBC AUTO: 98 FL (ref 78–100)
MICROALBUMIN UR-MCNC: 8 MG/L
MICROALBUMIN/CREAT UR: 10 MG/G CR (ref 0–17)
MONOCYTES # BLD AUTO: 0.9 10E3/UL (ref 0–1.3)
MONOCYTES NFR BLD AUTO: 9 %
NEUTROPHILS # BLD AUTO: 5.5 10E3/UL (ref 1.6–8.3)
NEUTROPHILS NFR BLD AUTO: 58 %
NONHDLC SERPL-MCNC: 56 MG/DL
PLATELET # BLD AUTO: 234 10E3/UL (ref 150–450)
POTASSIUM BLD-SCNC: 4.2 MMOL/L (ref 3.4–5.3)
PROT SERPL-MCNC: 7.5 G/DL (ref 6.8–8.8)
RBC # BLD AUTO: 4.35 10E6/UL (ref 4.4–5.9)
SODIUM SERPL-SCNC: 134 MMOL/L (ref 133–144)
TRIGL SERPL-MCNC: 137 MG/DL
WBC # BLD AUTO: 9.5 10E3/UL (ref 4–11)

## 2022-04-04 PROCEDURE — 93000 ELECTROCARDIOGRAM COMPLETE: CPT | Performed by: FAMILY MEDICINE

## 2022-04-04 PROCEDURE — 83690 ASSAY OF LIPASE: CPT | Performed by: FAMILY MEDICINE

## 2022-04-04 PROCEDURE — 80061 LIPID PANEL: CPT | Performed by: FAMILY MEDICINE

## 2022-04-04 PROCEDURE — 99214 OFFICE O/P EST MOD 30 MIN: CPT | Performed by: FAMILY MEDICINE

## 2022-04-04 PROCEDURE — 82043 UR ALBUMIN QUANTITATIVE: CPT | Performed by: FAMILY MEDICINE

## 2022-04-04 PROCEDURE — 80053 COMPREHEN METABOLIC PANEL: CPT | Performed by: FAMILY MEDICINE

## 2022-04-04 PROCEDURE — 36415 COLL VENOUS BLD VENIPUNCTURE: CPT | Performed by: FAMILY MEDICINE

## 2022-04-04 PROCEDURE — 85025 COMPLETE CBC W/AUTO DIFF WBC: CPT | Performed by: FAMILY MEDICINE

## 2022-04-04 PROCEDURE — 83036 HEMOGLOBIN GLYCOSYLATED A1C: CPT | Performed by: FAMILY MEDICINE

## 2022-04-04 RX ORDER — FAMOTIDINE 40 MG/1
40 TABLET, FILM COATED ORAL DAILY
Qty: 14 TABLET | Refills: 0 | Status: SHIPPED | OUTPATIENT
Start: 2022-04-04 | End: 2022-08-10

## 2022-04-04 ASSESSMENT — ENCOUNTER SYMPTOMS: ABDOMINAL PAIN: 1

## 2022-04-04 ASSESSMENT — PAIN SCALES - GENERAL: PAINLEVEL: MODERATE PAIN (4)

## 2022-04-04 NOTE — PROGRESS NOTES
Assessment & Plan     Left upper quadrant pain  No acute abdomen sign today.  Suspect mild gastritis. Discussed trial of famotidine. Patient concurred.  Other conditions considered: angina, PUD, bowel gas, pancreatic disease.  Patient was advised of his EKG tracing today - no acute ST-T changes to suspect ischemia, no dysrhythmia  Patient has no other risks or habits that predispose to hyperacidity, but literature about treatment of gastritis has been given to patient.  Reassess response to treatment in 2 weeks.  - famotidine (PEPCID) 40 MG tablet  Dispense: 14 tablet; Refill: 0  - CBC with Platelets & Differential  - Comprehensive metabolic panel  - Lipase  - EKG 12-lead complete w/read - Clinics  - Lipase  - Comprehensive metabolic panel  - CBC with Platelets & Differential    Type 2 diabetes mellitus without complication, without long-term current use of insulin (H)  Reminded he is due for labs.  Patient agreed to have it placed today for future when he is fasting.  - HEMOGLOBIN A1C  - Lipid panel reflex to direct LDL Fasting  - Albumin Random Urine Quantitative with Creat Ratio  - Albumin Random Urine Quantitative with Creat Ratio  - Lipid panel reflex to direct LDL Fasting  - HEMOGLOBIN A1C    }     Patient Instructions   Start famotidine 40 mg orally nightly to try to treat possible mild gastritis (stomach lining inflammation).  You will be contacted in 1-2 days for results of your lab tests.    Follow up in 2 weeks with a virtual visit regarding the above tretament.    Schedule fasting blood test for diabetes labs.  Schedule diabetes follow up for after the tests are done.      Patient Education     Gastritis or Ulcer, No Antibiotic Treatment    Gastritis is irritation and inflammation of the stomach lining. This means the lining is red and swollen. It can cause shallow sores in the stomach lining called erosions. An ulcer is a deeper open sore in the lining of the stomach. It may also occur in the first  part of the small intestine (duodenum). The causes and symptoms of gastritis and ulcers are very similar.  Causes and risk factors for both problems can include:    Long-term use of nonsteroidal anti-inflammatory drugs (NSAIDs), such as aspirin and ibuprofen    H. pylori bacteria infection    Tobacco use    Alcohol use    Certain other conditions such as immune disorders, certain medicines (high-dose iron supplements) and street drugs (such as cocaine)  Symptoms for both problems can include:    Dull or burning pain in the upper part of the belly    Loss of appetite    Heartburn or upset stomach    Frequent burping    Bloated feeling    Nausea with or without vomiting  You likely had an evaluation to help find the exact cause and extent of your problem. This may have included a health history, exam, and certain tests.  Results showed that your problem is not due to H. pylori infection. For this reason, you don't need antibiotics as part of your treatment.  Whether your problem is gastritis or an ulcer, you will still need to take other medicines, however. You will also need to follow instructions to help reduce stomach irritation so your stomach can heal.   Home care    Take any medicines you re prescribed exactly as directed. Common medicines used to treat gastritis include:  ? Antacids. These help neutralize the normal acids in your stomach.  ? Proton pump inhibitors. These block your stomach from making any acid.  ? H2 blockers. These reduce the amount of acid your stomach makes.  ? Bismuth subsalicylate. This helps protect the lining of your stomach from acid.    Don't take any NSAIDs during your treatment. If you take NSAID to help treat other health problems, tell your healthcare provider. He or she may need to adjust your medicine plan or change the dosage.    Don t use tobacco. Also don t drink alcohol. These products can increase the amount of acid your stomach makes. This can delay healing. It can also  worsen symptoms.  Follow-up care  Follow up with your healthcare provider, or as advised. In some cases, more testing may be needed.  When to seek medical advice  Call your healthcare provider right away if any of these occur:    Fever of 100.4 F (38 C) or higher, or as directed by your healthcare provider    Stomach pain that worsens or moves to the lower right part of belly    Extreme fatigue    Weakness or dizziness    Continued weight loss    Frequent vomiting, blood in your vomit, or coffee ground-like substance in your vomit    Black, tarry, or bloody stools  Call 911  Call 911 if any of these occur:    Chest pain appears or worsens, or spreads to the back, neck, shoulder, or arm    Unusually fast heart rate    Trouble breathing or swallowing    Confusion    Extreme drowsiness or trouble waking up    Fainting    Large amounts of blood present in vomit or stool  [employer] last reviewed this educational content on 3/1/2018    0222-2014 [employer] All rights reserved. This information is not intended as a substitute for professional medical care. Always follow your healthcare professional's instructions.           Patient Education     Abdominal Pain  Abdominal pain is pain in the stomach or belly area. Everyone has this pain from time to time. In many cases it goes away on its own. But abdominal pain can sometimes be due to a serious problem, such as appendicitis. So it s important to know when to get help.    Causes of abdominal pain  There are many possible causes of abdominal pain. Common causes in adults include:    Constipation, diarrhea, or gas    Stomach acid flowing back up into the esophagus (acid reflux or heartburn)    Severe acid reflux, called GERD (gastroesophageal reflux disease)    A sore in the lining of the stomach or small intestine (peptic ulcer)    Inflammation of the gallbladder, liver, or pancreas    Gallstones or kidney stones    Appendicitis     Intestinal blockage     An  internal organ pushing through a muscle or other tissue (hernia)    Urinary tract infections    In women, menstrual cramps, fibroids, ovarian cysts, pelvic inflammatory disease, or endometriosis    Inflammation or infection of the intestines, including Crohn's disease and ulcerative colitis    Irritable bowel syndrome  Diagnosing the cause of abdominal pain  Your healthcare provider will give you a physical exam help find the cause of your pain. If needed, you will have tests. Belly pain has many possible causes. So it can be hard to find the reason for your pain. Giving details about your pain can help. Tell your provider where and when you feel the pain, and what makes it better or worse. Also let your provider know if you have other symptoms such as:    Fever    Tiredness    Upset stomach (nausea)    Vomiting    Changes in bathroom habits    Blood in the stool or black, tarry stool    Weight loss that you can't explain (involuntary weight loss?)  Also report any family history of stomach or intestinal problems, or cancers. Tell your provider about all your alcohol use and drug use. Tell your provider about all medicines you use, including herbs, vitamins, and supplements.  Treating abdominal pain  Some causes of pain need emergency medical treatment right away. These include appendicitis or a bowel blockage. Other problems can be treated with rest, fluids, or medicines. Your healthcare provider can give you specific instructions for treatment or self-care based on what is causing your pain.     If you have vomiting or diarrhea, sip water or other clear fluids. When you are ready to eat solid foods again, start with small amounts of easy-to-digest, low-fat foods. These include apple sauce, toast, or crackers.  When to get medical care  Call 911 or go to the hospital right away if you:    Can t pass stool and are vomiting    Are vomiting blood or have bloody diarrhea or black, tarry diarrhea    Have chest, neck, or  shoulder pain    Feel like you might pass out    Have pain in your shoulder blades with nausea    Have sudden, severe belly pain    Have new, severe pain unlike any you have felt before    Have a belly that is rigid, hard, and hurts to touch  Call your healthcare provider if you have:    Pain for more than 5 days    Bloating for more than 2 days    Diarrhea for more than 5 days    A fever of 100.4 F (38 C) or higher, or as directed by your healthcare provider    Pain that gets worse    Weight loss for no reason    Continued lack of appetite    Blood in your stool  How to prevent abdominal pain  Here are some tips to help prevent abdominal pain:    Eat smaller amounts of food at each meal.    Don't eat greasy, fried, or other high-fat foods.    Don't eat foods that give you gas.    Exercise regularly.    Drink plenty of fluids.  To help prevent GERD symptoms:    Quit smoking.    Reduce alcohol and foods that increase stomach acid.    Don't use aspirin or over-the-counter pain and fever medicines, if possible. This includes nonsteroidal anti-inflammatory drugs (NSAIDs).    Lose excess weight.    Finish eating at least 2 hours before you go to bed or lie down.    Raise the head of your bed.  [employer] last reviewed this educational content on 4/1/2019 2000-2021 [employer]. All rights reserved. This information is not intended as a substitute for professional medical care. Always follow your healthcare professional's instructions.               Return in about 2 weeks (around 4/18/2022) for Virtual visit for follow up on famotidine treatment..    Mo Granados MD  [facility] is a 66 year old who presents for the following health issues   Chief Complaint   Patient presents with     Abdominal Pain     Pt being seen today for abdominal pain.       Abdominal Pain     History of Present Illness       Reason for visit:  Abdominal pain  Symptom onset:  More than  a month  Symptoms include:  Pain under left ribcage  Symptom intensity:  Moderate  Symptom progression:  Staying the same  Had these symptoms before:  Yes  Has tried/received treatment for these symptoms:  No  What makes it worse:  Not sure  What makes it better:  Not sure    He eats 2-3 servings of fruits and vegetables daily.He consumes 0 sweetened beverage(s) daily.He exercises with enough effort to increase his heart rate 30 to 60 minutes per day.  He exercises with enough effort to increase his heart rate 4 days per week.   He is taking medications regularly.       Pain History:  When did you first notice your pain? - Less than 1 week   Have you seen anyone else for your pain? No  Where in your body do you have pain? Abdominal/Flank Pain  Onset/Duration: 1 1/2 months, maybe a little longer  Description:   Character: Dull ache  Location: left upper quadrant  Radiation: Back  Has oken him up at night sometimes.  Intensity: 2-8/10  Progression of Symptoms:  same and constant  Accompanying Signs & Symptoms:  Fever/Chills: no  Gas/Bloating: YES- little bloating  Nausea: no  Vomitting: no  Diarrhea: no  Constipation: maybe  Dysuria or Hematuria: no  Dyspnea: no  Chest pain: no  Dysphagia: no  Patient  has a patch of skin in the same area that feels a little numb when touching.  History:   Trauma: no  Previous similar pain: YES- but has only lasted a day or two than goes away, happens randomly  Previous tests done: none  Precipitating factors:   Does the pain change with:     Food: no    Bowel Movement: no    Urination: no   Other factors:  no  Therapies tried and outcome: pepto bismol, not sure if it helps    Patient is due for A1c for diabetes monitoring, and fasting lab in a few weeks.    Review of Systems   Gastrointestinal: Positive for abdominal pain.      C: NEGATIVE for fever, chills, change in weight  I: NEGATIVE for jaundice/rash  E: NEGATIVE for jaundice  R: NEGATIVE for significant cough or SOB  CV:  "NEGATIVE for chest pain, palpitations or peripheral edema  GI: see above   male :see above  M: NEGATIVE for significant arthralgias or myalgia  H: NEGATIVE for bleeding problems      Objective    /76   Pulse 82   Temp (!) 96.6  F (35.9  C) (Tympanic)   Resp 16   Ht 1.803 m (5' 11\")   Wt 103.8 kg (228 lb 12.8 oz)   SpO2 96%   BMI 31.91 kg/m    Body mass index is 31.91 kg/m .  Physical Exam   GENERAL: obese, alert and no distress  EYES: pink conjunctivae, no icterus  RESP: lungs clear to auscultation - no rales, no rhonchi, no wheezes  CV: regular rates and rhythm, normal S1 S2, no S3 or S4 and no murmur, no click or rub  ABD: rounded abdomen, no skin changes, no direct TTP, no palpable palpable mass, no guarding, no Thorpe's sign, no palpable organomegaly  MS: extremities- no gross deformities noted, no edema  SKIN: good turgor, no jaundice or rash    Results for orders placed or performed in visit on 04/04/22   Lipase     Status: Abnormal   Result Value Ref Range    Lipase 440 (H) 73 - 393 U/L   Comprehensive metabolic panel     Status: Abnormal   Result Value Ref Range    Sodium 134 133 - 144 mmol/L    Potassium 4.2 3.4 - 5.3 mmol/L    Chloride 102 94 - 109 mmol/L    Carbon Dioxide (CO2) 29 20 - 32 mmol/L    Anion Gap 3 3 - 14 mmol/L    Urea Nitrogen 14 7 - 30 mg/dL    Creatinine 0.68 0.66 - 1.25 mg/dL    Calcium 8.7 8.5 - 10.1 mg/dL    Glucose 151 (H) 70 - 99 mg/dL    Alkaline Phosphatase 81 40 - 150 U/L    AST 34 0 - 45 U/L    ALT 66 0 - 70 U/L    Protein Total 7.5 6.8 - 8.8 g/dL    Albumin 3.9 3.4 - 5.0 g/dL    Bilirubin Total 0.9 0.2 - 1.3 mg/dL    GFR Estimate >90 >60 mL/min/1.73m2   Albumin Random Urine Quantitative with Creat Ratio     Status: None   Result Value Ref Range    Creatinine Urine mg/dL 80 mg/dL    Albumin Urine mg/L 8 mg/L    Albumin Urine mg/g Cr 10.00 0.00 - 17.00 mg/g Cr   Lipid panel reflex to direct LDL Fasting     Status: Abnormal   Result Value Ref Range    Cholesterol 84 " <200 mg/dL    Triglycerides 137 <150 mg/dL    Direct Measure HDL 28 (L) >=40 mg/dL    LDL Cholesterol Calculated 29 <=100 mg/dL    Non HDL Cholesterol 56 <130 mg/dL    Patient Fasting > 8hrs? No     Narrative    Cholesterol  Desirable:  <200 mg/dL    Triglycerides  Normal:  Less than 150 mg/dL  Borderline High:  150-199 mg/dL  High:  200-499 mg/dL  Very High:  Greater than or equal to 500 mg/dL    Direct Measure HDL  Female:  Greater than or equal to 50 mg/dL   Male:  Greater than or equal to 40 mg/dL    LDL Cholesterol  Desirable:  <100mg/dL  Above Desirable:  100-129 mg/dL   Borderline High:  130-159 mg/dL   High:  160-189 mg/dL   Very High:  >= 190 mg/dL    Non HDL Cholesterol  Desirable:  130 mg/dL  Above Desirable:  130-159 mg/dL  Borderline High:  160-189 mg/dL  High:  190-219 mg/dL  Very High:  Greater than or equal to 220 mg/dL   HEMOGLOBIN A1C     Status: Abnormal   Result Value Ref Range    Hemoglobin A1C 7.2 (H) 0.0 - 5.6 %   CBC with platelets and differential     Status: Abnormal   Result Value Ref Range    WBC Count 9.5 4.0 - 11.0 10e3/uL    RBC Count 4.35 (L) 4.40 - 5.90 10e6/uL    Hemoglobin 14.4 13.3 - 17.7 g/dL    Hematocrit 42.7 40.0 - 53.0 %    MCV 98 78 - 100 fL    MCH 33.1 (H) 26.5 - 33.0 pg    MCHC 33.7 31.5 - 36.5 g/dL    RDW 12.0 10.0 - 15.0 %    Platelet Count 234 150 - 450 10e3/uL    % Neutrophils 58 %    % Lymphocytes 31 %    % Monocytes 9 %    % Eosinophils 2 %    % Basophils 0 %    Absolute Neutrophils 5.5 1.6 - 8.3 10e3/uL    Absolute Lymphocytes 2.9 0.8 - 5.3 10e3/uL    Absolute Monocytes 0.9 0.0 - 1.3 10e3/uL    Absolute Eosinophils 0.1 0.0 - 0.7 10e3/uL    Absolute Basophils 0.0 0.0 - 0.2 10e3/uL   CBC with Platelets & Differential     Status: Abnormal    Narrative    The following orders were created for panel order CBC with Platelets & Differential.  Procedure                               Abnormality         Status                     ---------                                -----------         ------                     CBC with platelets and d...[[id number]]  Abnormal            Final result                 Please view results for these tests on the individual orders.

## 2022-04-04 NOTE — PATIENT INSTRUCTIONS
Start famotidine 40 mg orally nightly to try to treat possible mild gastritis (stomach lining inflammation).  You will be contacted in 1-2 days for results of your lab tests.    Follow up in 2 weeks with a virtual visit regarding the above tretament.    Schedule fasting blood test for diabetes labs.  Schedule diabetes follow up for after the tests are done.      Patient Education     Gastritis or Ulcer, No Antibiotic Treatment    Gastritis is irritation and inflammation of the stomach lining. This means the lining is red and swollen. It can cause shallow sores in the stomach lining called erosions. An ulcer is a deeper open sore in the lining of the stomach. It may also occur in the first part of the small intestine (duodenum). The causes and symptoms of gastritis and ulcers are very similar.  Causes and risk factors for both problems can include:    Long-term use of nonsteroidal anti-inflammatory drugs (NSAIDs), such as aspirin and ibuprofen    H. pylori bacteria infection    Tobacco use    Alcohol use    Certain other conditions such as immune disorders, certain medicines (high-dose iron supplements) and street drugs (such as cocaine)  Symptoms for both problems can include:    Dull or burning pain in the upper part of the belly    Loss of appetite    Heartburn or upset stomach    Frequent burping    Bloated feeling    Nausea with or without vomiting  You likely had an evaluation to help find the exact cause and extent of your problem. This may have included a health history, exam, and certain tests.  Results showed that your problem is not due to H. pylori infection. For this reason, you don't need antibiotics as part of your treatment.  Whether your problem is gastritis or an ulcer, you will still need to take other medicines, however. You will also need to follow instructions to help reduce stomach irritation so your stomach can heal.   Home care    Take any medicines you re prescribed exactly as directed.  Common medicines used to treat gastritis include:  ? Antacids. These help neutralize the normal acids in your stomach.  ? Proton pump inhibitors. These block your stomach from making any acid.  ? H2 blockers. These reduce the amount of acid your stomach makes.  ? Bismuth subsalicylate. This helps protect the lining of your stomach from acid.    Don't take any NSAIDs during your treatment. If you take NSAID to help treat other health problems, tell your healthcare provider. He or she may need to adjust your medicine plan or change the dosage.    Don t use tobacco. Also don t drink alcohol. These products can increase the amount of acid your stomach makes. This can delay healing. It can also worsen symptoms.  Follow-up care  Follow up with your healthcare provider, or as advised. In some cases, more testing may be needed.  When to seek medical advice  Call your healthcare provider right away if any of these occur:    Fever of 100.4 F (38 C) or higher, or as directed by your healthcare provider    Stomach pain that worsens or moves to the lower right part of belly    Extreme fatigue    Weakness or dizziness    Continued weight loss    Frequent vomiting, blood in your vomit, or coffee ground-like substance in your vomit    Black, tarry, or bloody stools  Call 911  Call 911 if any of these occur:    Chest pain appears or worsens, or spreads to the back, neck, shoulder, or arm    Unusually fast heart rate    Trouble breathing or swallowing    Confusion    Extreme drowsiness or trouble waking up    Fainting    Large amounts of blood present in vomit or stool  [employer] last reviewed this educational content on 3/1/2018    9654-7544 [employer]. All rights reserved. This information is not intended as a substitute for professional medical care. Always follow your healthcare professional's instructions.           Patient Education     Abdominal Pain  Abdominal pain is pain in the stomach or belly area. Everyone  has this pain from time to time. In many cases it goes away on its own. But abdominal pain can sometimes be due to a serious problem, such as appendicitis. So it s important to know when to get help.    Causes of abdominal pain  There are many possible causes of abdominal pain. Common causes in adults include:    Constipation, diarrhea, or gas    Stomach acid flowing back up into the esophagus (acid reflux or heartburn)    Severe acid reflux, called GERD (gastroesophageal reflux disease)    A sore in the lining of the stomach or small intestine (peptic ulcer)    Inflammation of the gallbladder, liver, or pancreas    Gallstones or kidney stones    Appendicitis     Intestinal blockage     An internal organ pushing through a muscle or other tissue (hernia)    Urinary tract infections    In women, menstrual cramps, fibroids, ovarian cysts, pelvic inflammatory disease, or endometriosis    Inflammation or infection of the intestines, including Crohn's disease and ulcerative colitis    Irritable bowel syndrome  Diagnosing the cause of abdominal pain  Your healthcare provider will give you a physical exam help find the cause of your pain. If needed, you will have tests. Belly pain has many possible causes. So it can be hard to find the reason for your pain. Giving details about your pain can help. Tell your provider where and when you feel the pain, and what makes it better or worse. Also let your provider know if you have other symptoms such as:    Fever    Tiredness    Upset stomach (nausea)    Vomiting    Changes in bathroom habits    Blood in the stool or black, tarry stool    Weight loss that you can't explain (involuntary weight loss?)  Also report any family history of stomach or intestinal problems, or cancers. Tell your provider about all your alcohol use and drug use. Tell your provider about all medicines you use, including herbs, vitamins, and supplements.  Treating abdominal pain  Some causes of pain need  emergency medical treatment right away. These include appendicitis or a bowel blockage. Other problems can be treated with rest, fluids, or medicines. Your healthcare provider can give you specific instructions for treatment or self-care based on what is causing your pain.     If you have vomiting or diarrhea, sip water or other clear fluids. When you are ready to eat solid foods again, start with small amounts of easy-to-digest, low-fat foods. These include apple sauce, toast, or crackers.  When to get medical care  Call 911 or go to the hospital right away if you:    Can t pass stool and are vomiting    Are vomiting blood or have bloody diarrhea or black, tarry diarrhea    Have chest, neck, or shoulder pain    Feel like you might pass out    Have pain in your shoulder blades with nausea    Have sudden, severe belly pain    Have new, severe pain unlike any you have felt before    Have a belly that is rigid, hard, and hurts to touch  Call your healthcare provider if you have:    Pain for more than 5 days    Bloating for more than 2 days    Diarrhea for more than 5 days    A fever of 100.4 F (38 C) or higher, or as directed by your healthcare provider    Pain that gets worse    Weight loss for no reason    Continued lack of appetite    Blood in your stool  How to prevent abdominal pain  Here are some tips to help prevent abdominal pain:    Eat smaller amounts of food at each meal.    Don't eat greasy, fried, or other high-fat foods.    Don't eat foods that give you gas.    Exercise regularly.    Drink plenty of fluids.  To help prevent GERD symptoms:    Quit smoking.    Reduce alcohol and foods that increase stomach acid.    Don't use aspirin or over-the-counter pain and fever medicines, if possible. This includes nonsteroidal anti-inflammatory drugs (NSAIDs).    Lose excess weight.    Finish eating at least 2 hours before you go to bed or lie down.    Raise the head of your bed.  [employer] last reviewed this  educational content on 4/1/2019 2000-2021 [employer]. All rights reserved. This information is not intended as a substitute for professional medical care. Always follow your healthcare professional's instructions.

## 2022-04-05 NOTE — RESULT ENCOUNTER NOTE
Already discussed on encounter today.  
No notes recorded by provider
0 = swallows foods/liquids without difficulty

## 2022-04-06 ENCOUNTER — TELEPHONE (OUTPATIENT)
Dept: FAMILY MEDICINE | Facility: CLINIC | Age: 67
End: 2022-04-06
Payer: MEDICARE

## 2022-04-06 NOTE — TELEPHONE ENCOUNTER
Reason for Call:  Other appointment    Detailed comments: patients wife called and patient has an appointment with Roberta Florence at [facility] on April 21.  Patient received a my chart messged that provider wants to see them 1 week sooner.  Needs approval to schedule.  Also lab appointment on April 19 needs re schedule as well 2 days before Roberta Florence appointment.  Please contact patient.  Thank you.    Phone Number Patient can be reached at: Home number on file [phone] (home)    Best Time: any    Can we leave a detailed message on this number? YES    Call taken on 4/6/2022 at 7:47 AM by Ce Cardenas

## 2022-04-06 NOTE — TELEPHONE ENCOUNTER
Pt was seen 2 days ago for left upper quadrant pain, possible gastritis, with instructions to return in 2 weeks.    Pt has appt 4/21 but was instructed to be seen in 7 days per lab result note.    Made appt for 4/12/22.  Pt will arrange fasting lab appt sooner.    Leticia Lyles RN

## 2022-04-07 ENCOUNTER — DOCUMENTATION ONLY (OUTPATIENT)
Dept: LAB | Facility: CLINIC | Age: 67
End: 2022-04-07
Payer: MEDICARE

## 2022-04-07 DIAGNOSIS — R74.8 ELEVATED LIPASE: Primary | ICD-10-CM

## 2022-04-08 ENCOUNTER — LAB (OUTPATIENT)
Dept: LAB | Facility: CLINIC | Age: 67
End: 2022-04-08
Payer: MEDICARE

## 2022-04-08 DIAGNOSIS — R74.8 ELEVATED LIPASE: ICD-10-CM

## 2022-04-08 LAB — LIPASE SERPL-CCNC: 83 U/L (ref 73–393)

## 2022-04-08 PROCEDURE — 36415 COLL VENOUS BLD VENIPUNCTURE: CPT

## 2022-04-08 PROCEDURE — 83690 ASSAY OF LIPASE: CPT

## 2022-04-11 ENCOUNTER — MYC MEDICAL ADVICE (OUTPATIENT)
Dept: FAMILY MEDICINE | Facility: CLINIC | Age: 67
End: 2022-04-11
Payer: MEDICARE

## 2022-04-12 ENCOUNTER — OFFICE VISIT (OUTPATIENT)
Dept: FAMILY MEDICINE | Facility: CLINIC | Age: 67
End: 2022-04-12
Payer: MEDICARE

## 2022-04-12 VITALS
OXYGEN SATURATION: 98 % | TEMPERATURE: 96.4 F | WEIGHT: 224.6 LBS | HEIGHT: 71 IN | RESPIRATION RATE: 16 BRPM | BODY MASS INDEX: 31.44 KG/M2 | HEART RATE: 72 BPM | DIASTOLIC BLOOD PRESSURE: 80 MMHG | SYSTOLIC BLOOD PRESSURE: 116 MMHG

## 2022-04-12 DIAGNOSIS — R10.12 LEFT UPPER QUADRANT PAIN: ICD-10-CM

## 2022-04-12 DIAGNOSIS — E11.9 TYPE 2 DIABETES MELLITUS WITHOUT COMPLICATION, WITHOUT LONG-TERM CURRENT USE OF INSULIN (H): Primary | ICD-10-CM

## 2022-04-12 PROCEDURE — 99214 OFFICE O/P EST MOD 30 MIN: CPT | Performed by: FAMILY MEDICINE

## 2022-04-12 ASSESSMENT — PAIN SCALES - GENERAL: PAINLEVEL: MODERATE PAIN (4)

## 2022-04-12 NOTE — PROGRESS NOTES
Assessment & Plan     Type 2 diabetes mellitus without complication, without long-term current use of insulin (H)  Patient was advised of his most recent labs. Diabetes is slightly out of control with A1c at 7.2% - patient has been in the 6's prior.  Reviewed his previous treatment in detail. Did not tolerate metformin.  Discussed option of starting low dose glipizide XL, including its MOA and possible side effects.  Patient prefers to still not be on med.  Discussed in detail consistency in carb-controlled diet and regular exercise year round.  Patient said he will be more consistent with lifesetyle changes.  Repeat A1c in 3 months. If higher than current level, patient will be started on med.  Patient was advised of possible risk of gradually or more reapidly increasing glucose and he verbalized understanding and still preferred not to be on med.  - Glucose  - Hemoglobin A1c    Left upper quadrant pain  Non-acute. Still recurrent per patient.  No improvement after a week of famotidine. Will still continue for another week or so.  His lipase was initially borderline high but came down to well within normal range few days after. So doubt patient has acute and current pancreatitis.  Abdominal imaging advised to patient. He concurs.  Consider GI consult if symptoms persist.  Return precautions discussed and given to patient.   - CT Abdomen Pelvis w Contrast       Patient Instructions   Continue famotidine 40 mg daily.    To schedule the CT scan of the abdomen, call [phone].     You preferred to defer starting medication for diabetes.    Be consistent with low salt, low trans fat and low saturated fat diet.  Eat food rich in omega-3-fatty acids as you tolerate. (salmon, olive oil)  Eat 5 cups of vegetables, fruits and whole grains per day.  Limit starchy food (white rice, white bread, white pasta, white potatoes) to less than a cup per meal.  Minimize sweets, junk food and fastfood. Limit soda beverages to one  serving per day; best to avoid it altogether though.    Exercise: moderate intensity sustained for at least 30 mins per episode, goal of 150 mins per week at least  Combine cardiovascular and resistance exercises.  These exercise recommendations are in addition to your daily activity at work or home.  Work on losing weight.      Regular foot care; don't walk barefoot  Annual eye exam.  Please request your eye doctor to furnish a copy of the eye exam report to the clinic to be placed in your record.  Flu vaccine by the end of October yearly.  Be sure to update any other recommended vaccinations for diabetics.    Blood tests: schedule repeat lab test appointment in July 2022       Return in about 3 months (around 7/12/2022).    Mo Granados MD  [facility] HospitalCAYETANO KRUGER is a 66 year old who presents for the following health issues  accompanied by his spouse.  Chief Complaint   Patient presents with     Diabetes     Pt being seen for a follow up on diabetes.  Labs done on 4/4/22 and gly hgb elevated.     Abdominal Pain     Pt also being seen for a follow up on abdominal pain.       HPI     Diabetes Follow-up    How often are you checking your blood sugar? Does not check    What symptoms do you notice when your blood sugar is low?  None    What concerns do you have today about your diabetes? None     Do you have any of these symptoms? (Select all that apply)  No numbness or tingling in feet.  No redness, sores or blisters on feet.  No complaints of excessive thirst.  No reports of blurry vision.  No significant changes to weight.      BP Readings from Last 2 Encounters:   04/12/22 116/80   04/04/22 122/76     Hemoglobin A1C POCT (%)   Date Value   07/01/2021 6.1 (H)   11/10/2020 6.3 (H)     Hemoglobin A1C (%)   Date Value   04/04/2022 7.2 (H)     LDL Cholesterol Calculated (mg/dL)   Date Value   04/04/2022 29   07/01/2021 47   10/22/2020 47       How many days per week do you exercise  "enough to make your heart beat faster? 3    How many minutes a day do you exercise enough to make your heart beat faster? 30 - 60    How many days per week do you miss taking your medication? NA    Patient has had adverse reaction to metformin few years ago. Patient reports he had sensation of pin pricks on arms, and developed a rash.      Pain History:  When did you first notice your pain? - Less than 1 week   Have you seen anyone else for your pain? Yes - seen 4/4/22  Where in your body do you have pain? Abdominal/Flank Pain  Onset/Duration: past couple of months  Description:   Character: Dull ache  Location: left upper quadrant  Radiation: None and Back  Intensity: 4/10  Progression of Symptoms:  same and constant  Accompanying Signs & Symptoms:  Fever/Chills: no  Gas/Bloating: YES- little bloating  Nausea: no  Vomitting: no  Diarrhea: no  Constipation: maybe  Dysuria or Hematuria: no  History:   Trauma: no  Previous similar pain: YES  Previous tests done: lipase level  Precipitating factors:   Does the pain change with:     Food: no    Bowel Movement: no    Urination: no   Other factors:  no  Therapies tried and outcome: pepcid, did not notice any difference    Patient ha shad a mildly elevated lipase on last visit. Repeated a week later and it was back to normal range.  Patient continues to have waxing and waning LUQ pain.  Patient ha sbeen taking famotidine for the last 8 days now.      Review of Systems   Constitutional, HEENT, cardiovascular, pulmonary, GI, , musculoskeletal, neuro, skin, endocrine and psych systems are negative, except as otherwise noted.      Objective    /80   Pulse 72   Temp (!) 96.4  F (35.8  C) (Tympanic)   Resp 16   Ht 1.803 m (5' 11\")   Wt 101.9 kg (224 lb 9.6 oz)   SpO2 98%   BMI 31.33 kg/m    Body mass index is 31.33 kg/m .  Physical Exam   GENERAL: obese , alert and no distress  EYES: no icterus    ABD: rounded abdomen, no skin changes, mild epigastrica nd LUQ TTP, " no palpable mass, no guarding, no Thorpe's sign, no palpable organomegaly  MS: extremities- no gross deformities noted, no edema  SKIN: good turgor, no jaundice or rash    Lab on 04/08/2022   Component Date Value Ref Range Status     Lipase 04/08/2022 83  73 - 393 U/L Final

## 2022-04-12 NOTE — PATIENT INSTRUCTIONS
Continue famotidine 40 mg daily.    To schedule the CT scan of the abdomen, call [phone].     You preferred to defer starting medication for diabetes.    Be consistent with low salt, low trans fat and low saturated fat diet.  Eat food rich in omega-3-fatty acids as you tolerate. (salmon, olive oil)  Eat 5 cups of vegetables, fruits and whole grains per day.  Limit starchy food (white rice, white bread, white pasta, white potatoes) to less than a cup per meal.  Minimize sweets, junk food and fastfood. Limit soda beverages to one serving per day; best to avoid it altogether though.    Exercise: moderate intensity sustained for at least 30 mins per episode, goal of 150 mins per week at least  Combine cardiovascular and resistance exercises.  These exercise recommendations are in addition to your daily activity at work or home.  Work on losing weight.      Regular foot care; don't walk barefoot  Annual eye exam.  Please request your eye doctor to furnish a copy of the eye exam report to the clinic to be placed in your record.  Flu vaccine by the end of October yearly.  Be sure to update any other recommended vaccinations for diabetics.    Blood tests: schedule repeat lab test appointment in July 2022

## 2022-04-15 ENCOUNTER — HOSPITAL ENCOUNTER (OUTPATIENT)
Dept: CT IMAGING | Facility: CLINIC | Age: 67
Discharge: HOME | End: 2022-04-15
Attending: FAMILY MEDICINE | Admitting: FAMILY MEDICINE
Payer: MEDICARE

## 2022-04-15 DIAGNOSIS — R10.12 LEFT UPPER QUADRANT PAIN: ICD-10-CM

## 2022-04-15 PROCEDURE — 250N000009 HC RX 250: Performed by: RADIOLOGY

## 2022-04-15 PROCEDURE — 250N000011 HC RX IP 250 OP 636: Performed by: RADIOLOGY

## 2022-04-15 PROCEDURE — 74177 CT ABD & PELVIS W/CONTRAST: CPT | Mod: MG

## 2022-04-15 RX ORDER — IOPAMIDOL 755 MG/ML
100 INJECTION, SOLUTION INTRAVASCULAR ONCE
Status: COMPLETED | OUTPATIENT
Start: 2022-04-15 | End: 2022-04-15

## 2022-04-15 RX ADMIN — SODIUM CHLORIDE 67 ML: 9 INJECTION, SOLUTION INTRAVENOUS at 08:14

## 2022-04-15 RX ADMIN — IOPAMIDOL 100 ML: 755 INJECTION, SOLUTION INTRAVENOUS at 08:13

## 2022-04-19 ENCOUNTER — TELEPHONE (OUTPATIENT)
Dept: FAMILY MEDICINE | Facility: CLINIC | Age: 67
End: 2022-04-19
Payer: MEDICARE

## 2022-04-19 DIAGNOSIS — Z96.649 HISTORY OF HIP REPLACEMENT, UNSPECIFIED LATERALITY: ICD-10-CM

## 2022-04-19 DIAGNOSIS — Z79.2 NEED FOR PROPHYLACTIC ANTIBIOTIC: Primary | ICD-10-CM

## 2022-04-19 NOTE — TELEPHONE ENCOUNTER
Reason for Call:  Other prescription    Detailed comments: Patient is calling stating that he needs amoxicillin for dental procedure due to hip replacements. He has three appts. In May.    Phone Number Patient can be reached at: Cell number on file:    Telephone Information:   Mobile [phone]       Best Time: any    Can we leave a detailed message on this number? YES    Call taken on 4/19/2022 at 2:01 PM by Courtney Mack

## 2022-04-21 RX ORDER — AMOXICILLIN 500 MG/1
2000 CAPSULE ORAL
Qty: 12 CAPSULE | Refills: 0 | Status: SHIPPED | OUTPATIENT
Start: 2022-04-21 | End: 2022-08-03

## 2022-08-02 DIAGNOSIS — Z79.2 NEED FOR PROPHYLACTIC ANTIBIOTIC: ICD-10-CM

## 2022-08-02 DIAGNOSIS — Z96.649 HISTORY OF HIP REPLACEMENT, UNSPECIFIED LATERALITY: ICD-10-CM

## 2022-08-02 NOTE — TELEPHONE ENCOUNTER
Patient requesting  A fill on his amoxicillin for his dental appt on Tuesday 8/9/2022.      Jhoana Pereira, RAYA Fitzpatrick

## 2022-08-03 RX ORDER — AMOXICILLIN 500 MG/1
2000 CAPSULE ORAL
Qty: 12 CAPSULE | Refills: 0 | Status: SHIPPED | OUTPATIENT
Start: 2022-08-03 | End: 2023-05-15

## 2022-08-07 ENCOUNTER — LAB (OUTPATIENT)
Dept: FAMILY MEDICINE | Facility: CLINIC | Age: 67
End: 2022-08-07
Attending: FAMILY MEDICINE
Payer: MEDICARE

## 2022-08-07 DIAGNOSIS — Z20.822 SUSPECTED 2019 NOVEL CORONAVIRUS INFECTION: ICD-10-CM

## 2022-08-07 PROCEDURE — 99207 PR NO CHARGE LOS: CPT

## 2022-08-07 PROCEDURE — U0005 INFEC AGEN DETEC AMPLI PROBE: HCPCS

## 2022-08-07 PROCEDURE — U0003 INFECTIOUS AGENT DETECTION BY NUCLEIC ACID (DNA OR RNA); SEVERE ACUTE RESPIRATORY SYNDROME CORONAVIRUS 2 (SARS-COV-2) (CORONAVIRUS DISEASE [COVID-19]), AMPLIFIED PROBE TECHNIQUE, MAKING USE OF HIGH THROUGHPUT TECHNOLOGIES AS DESCRIBED BY CMS-2020-01-R: HCPCS

## 2022-08-08 ENCOUNTER — TELEPHONE (OUTPATIENT)
Dept: NURSING | Facility: CLINIC | Age: 67
End: 2022-08-08

## 2022-08-08 LAB — SARS-COV-2 RNA RESP QL NAA+PROBE: POSITIVE

## 2022-08-08 NOTE — TELEPHONE ENCOUNTER
Coronavirus (COVID-19) Notification    Caller Name (Patient, parent, daughter/son, grandparent, etc)  Patient    Reason for call  Notify of Positive Coronavirus (COVID-19) lab results, assess symptoms,  review [facility] recommendations    Lab Result    Lab test:  2019-nCoV rRt-PCR or SARS-CoV-2 PCR    Oropharyngeal AND/OR nasopharyngeal swabs is POSITIVE for 2019-nCoV RNA/SARS-COV-2 PCR (COVID-19 virus)      Gather patient reported symptoms   Assessment   Current Symptoms at time of phone call, reported by patient: (if no symptoms, document: No symptoms] Body aches, sinus headache, sinus drainage, chest congestion, cough   Date of symptom(s) onset (if applicable)      If at time of call, Patients symptoms have worsened, the Patient should contact 911 or have someone drive them to Emergency Dept promptly:      If Patient calling 911, inform 911 personal that you have tested positive for the Coronavirus (COVID-19).  Place mask on and await 911 to arrive.    If Emergency Dept, If possible, please have another adult drive you to the Emergency Dept but you need to wear mask when in contact with other people.      Treatment Options:   Patient classified as COVID treatment eligible by Epic high risk algorithm: Yes  Is the patient symptomatic at the time of result notification? Yes. Was the onset of symptoms within the last 5 days? Yes.   There are now oral medications available for the treatment of COVID-19.  Taking one of these medications within the first five days of symptoms (when people may not yet feel severely ill) has been shown to make people feel better, prevent them from getting sicker, and preventing hospitalization and death.   Does the patient agree to have a visit with a provider to discuss treatment options? No.  Reason patient declined:  Not that sick and don't think I will get worse (save for people who possibly need it more)      Review information with Patient    Your result was positive. This  means you have COVID-19 (coronavirus).    How can I protect others?    These guidelines are for isolating before returning to work, school or .    If you DO have symptoms    Stay home and away from others     For at least 5 days after your symptoms started, AND    You are fever free for 24 hours (with no medicine that reduces fever), AND    Your other symptoms are better    Wear a mask for 10 full days anytime you are around others    If you DON'T have symptoms    Stay home and away from others for at least 5 days after your positive test    Wear a mask for 10 full days anytime you are around others    There may be different guidelines for healthcare facilities.  Please check with the specific sites before arriving.    If you have been told by a doctor that you were severely ill with COVID-19 or are immunocompromised, you should isolate for at least 10 days.    You should not go back to work until you meet the guidelines above for ending your home isolation. You don't need to be retested for COVID-19 before going back to work--studies show that you won't spread the virus if it's been at least 10 days since your symptoms started (or 20 days, if you have a weak immune system).    Employers, schools, and daycares: This is an official notice for this person's medical guidelines for returning in-person.  They must meet the above guidelines before going back to work, school or  in person.    You will receive a positive COVID-19 letter via [employer] or the mail soon with additional self-care information.    Would you like me to review some of that information with you now?  No    If you were tested for an upcoming procedure, please contact your provider for next steps.    Marcia Gil

## 2022-08-10 ENCOUNTER — VIRTUAL VISIT (OUTPATIENT)
Dept: PEDIATRICS | Facility: CLINIC | Age: 67
End: 2022-08-10
Payer: MEDICARE

## 2022-08-10 DIAGNOSIS — J01.90 ACUTE NON-RECURRENT SINUSITIS, UNSPECIFIED LOCATION: Primary | ICD-10-CM

## 2022-08-10 DIAGNOSIS — U07.1 INFECTION DUE TO 2019 NOVEL CORONAVIRUS: ICD-10-CM

## 2022-08-10 PROCEDURE — 99213 OFFICE O/P EST LOW 20 MIN: CPT | Mod: 95 | Performed by: NURSE PRACTITIONER

## 2022-08-10 NOTE — PATIENT INSTRUCTIONS
Start flonase (fluticasone) - you can buy the target brand (or drug store brand) as this is much cheaper.

## 2022-08-10 NOTE — PROGRESS NOTES
SLICK is a 66 year old who is being evaluated via a billable video visit.      How would you like to obtain your AVS? MyChart  If the video visit is dropped, the invitation should be resent by: Text to cell phone: [phone]  Will anyone else be joining your video visit? No  {If patient encounters technical issues they should call [phone] :520362}        {PROVIDER CHARTING PREFERENCE:716864}    Subjective      SLICK is a 66 year old,  presenting for the following health issues:  Covid Concern      HPI       COVID-19 Symptom Review  How many days ago did these symptoms start? 8 days    Are any of the following symptoms significant for you?    New or worsening difficulty breathing? No    Worsening cough? Mild cough, this has been resolving. More of just a tickle in his throat.    Fever or chills? Yes, now resolved. The highest temperature was 101.    Headache: YES, pounding sinus headache persisting. Has had this previously, sudafed and advil typically helps. Congestion has resolved in his chest, head and nose persists. Nose, eyes, and teeth are all throbbing. This is nothing new for him, he has had headaches like this in the past about every 2-3 months or so.     Sore throat: YES    Chest pain: No    Diarrhea: No    Body aches? YES, resolved    What treatments has patient tried? Acetaminophen, Nonsteroidals and Decongestant - oral     {Provider  Link to COVID SmartSet :403214}        Patient Active Problem List   Diagnosis     Benign neoplasm of colon     Coronary artery calcification seen on CAT scan     Diabetes mellitus, type 2 (H)     Genital herpes     Hyperlipemia     Hypertension     Right thyroid nodule     Lightheadedness     Past Medical History:   Diagnosis Date     Diabetes (H)      Osteoarthrosis, unspecified whether generalized or localized, pelvic region and thigh      Unspecified essential hypertension      Current Outpatient Medications   Medication     amoxicillin-clavulanate (AUGMENTIN)  "875-125 MG tablet     acyclovir (ZOVIRAX) 400 MG tablet     amoxicillin (AMOXIL) 500 MG capsule     aspirin (ASA) 81 MG EC tablet     atorvastatin (LIPITOR) 40 MG tablet     Calcium Carbonate (CALCIUM 600 PO)     chlorthalidone (HYGROTON) 25 MG tablet     cholecalciferol (VITAMIN D3) 25 mcg (1000 units) capsule     Cyanocobalamin (VITAMIN B12 PO)     lisinopril (ZESTRIL) 20 MG tablet     sildenafil (VIAGRA) 100 MG tablet     No current facility-administered medications for this visit.        Allergies   Allergen Reactions     Metformin Itching     Perianal itch and some erythema and skin itch on arms etc.          Review of Systems    ROS: 10 point ROS neg other than the symptoms noted above in the HPI.        Objective       Vitals:  No vitals were obtained today due to virtual visit.    Physical Exam   GENERAL: Healthy, alert and no distress  EYES: Eyes grossly normal to inspection.  No discharge or erythema, or obvious scleral/conjunctival abnormalities.  RESP: No audible wheeze, cough, or visible cyanosis.  No visible retractions or increased work of breathing.    SKIN: Visible skin clear. No significant rash, abnormal pigmentation or lesions.  NEURO: Cranial nerves grossly intact.  Mentation and speech appropriate for age.  PSYCH: Mentation appears normal, affect normal/bright, judgement and insight intact, normal speech and appearance well-groomed.            Video-Visit Details    Video Start Time: {video visit start/end time for provider to select:602139}    Type of service:  Video Visit    Video End Time:{video visit start/end time for provider to select:170167}    Originating Location (pt. Location): {video visit patient location:573134::\"Home\"}    Distant Location (provider location):  [facility]     Platform used for Video Visit: {Virtual Visit Platforms:977827::\"GetTaxi\"}    .  ..  "

## 2022-08-10 NOTE — PROGRESS NOTES
SLICK is a 66 year old who is being evaluated via a billable telephone visit.      What phone number would you like to be contacted at? [phone]  How would you like to obtain your AVS? MyChart    Assessment & Plan     Acute non-recurrent sinusitis, unspecified location  Suspect sinusitis secondary to COVID-19 infection. Given severity of symptoms, will treat with Augmentin and inhaled steroid nasal spray. Discussed headache red flags including new daily persistent headache. If headache does not resolve with treatment for sinusitis, recommend follow-up in clinic for evaluation of headache.  - amoxicillin-clavulanate (AUGMENTIN) 875-125 MG tablet; Take 1 tablet by mouth 2 times daily for 7 days    Infection due to 2019 novel coronavirus  On day 8 of symptoms, so he is outside window for treatment with paxlovid.       24 minutes spent on the date of the encounter doing chart review, patient visit and documentation          Follow-up: Return to clinic if symptoms fail to improve, worsen, or new symptoms develop with the above treatment plan.       NAHED Ramirez [facility]    Subjective      SLICK is a 66 year old, presenting for the following health issues:  Covid Concern      HPI     COVID-19 Symptom Review  How many days ago did these symptoms start? 8 days    Are any of the following symptoms significant for you?    New or worsening difficulty breathing? No    Worsening cough? Mild cough, this has been resolving. More of just a tickle in his throat.    Fever or chills? Yes, now resolved. The highest temperature was 101.    Headache: YES, pounding sinus headache persisting. Has had this previously, sudafed and advil typically helps. Congestion has resolved in his chest, head and nose congestion persists. Nose, eyes, and teeth are all throbbing. This is nothing new for him, he has had headaches like this in the past about every 2-3 months or so.     Sore throat: YES    Chest pain:  No    Diarrhea: No    Body aches? YES, resolved    What treatments has patient tried? Acetaminophen, Nonsteroidals and Decongestant - oral       Patient Active Problem List   Diagnosis     Benign neoplasm of colon     Coronary artery calcification seen on CAT scan     Diabetes mellitus, type 2 (H)     Genital herpes     Hyperlipemia     Hypertension     Right thyroid nodule     Lightheadedness     Current Outpatient Medications   Medication     amoxicillin-clavulanate (AUGMENTIN) 875-125 MG tablet     acyclovir (ZOVIRAX) 400 MG tablet     amoxicillin (AMOXIL) 500 MG capsule     aspirin (ASA) 81 MG EC tablet     atorvastatin (LIPITOR) 40 MG tablet     Calcium Carbonate (CALCIUM 600 PO)     chlorthalidone (HYGROTON) 25 MG tablet     cholecalciferol (VITAMIN D3) 25 mcg (1000 units) capsule     Cyanocobalamin (VITAMIN B12 PO)     lisinopril (ZESTRIL) 20 MG tablet     sildenafil (VIAGRA) 100 MG tablet     No current facility-administered medications for this visit.        Allergies   Allergen Reactions     Metformin Itching     Perianal itch and some erythema and skin itch on arms etc.        Review of Systems    ROS: 10 point ROS neg other than the symptoms noted above in the HPI.        Objective       Vitals:  No vitals were obtained today due to virtual visit.    Physical Exam   healthy, alert and no distress  PSYCH: Alert and oriented times 3; coherent speech, normal   rate and volume, able to articulate logical thoughts, able   to abstract reason, no tangential thoughts, no hallucinations   or delusions  His affect is normal and pleasant  RESP: No cough, no audible wheezing, able to talk in full sentences  Remainder of exam unable to be completed due to telephone visits            Phone call duration: 12 minutes    .  ..

## 2022-10-09 ENCOUNTER — HEALTH MAINTENANCE LETTER (OUTPATIENT)
Age: 67
End: 2022-10-09

## 2022-10-26 ENCOUNTER — LAB (OUTPATIENT)
Dept: LAB | Facility: CLINIC | Age: 67
End: 2022-10-26
Payer: MEDICARE

## 2022-10-26 DIAGNOSIS — E11.9 TYPE 2 DIABETES MELLITUS WITHOUT COMPLICATION, WITHOUT LONG-TERM CURRENT USE OF INSULIN (H): ICD-10-CM

## 2022-10-26 LAB
FASTING STATUS PATIENT QL REPORTED: YES
GLUCOSE SERPL-MCNC: 161 MG/DL (ref 70–99)
HBA1C MFR BLD: 7.1 % (ref 0–5.6)

## 2022-10-26 PROCEDURE — 82947 ASSAY GLUCOSE BLOOD QUANT: CPT

## 2022-10-26 PROCEDURE — 36415 COLL VENOUS BLD VENIPUNCTURE: CPT

## 2022-10-26 PROCEDURE — 83036 HEMOGLOBIN GLYCOSYLATED A1C: CPT

## 2022-11-03 DIAGNOSIS — A60.01 HERPES SIMPLEX INFECTION OF PENIS: ICD-10-CM

## 2022-11-04 RX ORDER — ACYCLOVIR 400 MG/1
TABLET ORAL
Qty: 180 TABLET | Refills: 3 | Status: SHIPPED | OUTPATIENT
Start: 2022-11-04 | End: 2023-02-13

## 2022-11-04 NOTE — TELEPHONE ENCOUNTER
"  Last Written Prescription Date:  11/29/2021  Last Fill Quantity: 180,  # refills: 3   Last office visit provider:  8/10/2022     Requested Prescriptions   Pending Prescriptions Disp Refills     acyclovir (ZOVIRAX) 400 MG tablet [Pharmacy Med Name: ACYCLOVIR TAB 400MG] 180 tablet 3     Sig: TAKE 1 TABLET TWICE A DAY       Antivirals for Herpes Protocol Passed - 11/3/2022  7:11 AM        Passed - Patient is age 12 or older        Passed - Recent (12 mo) or future (30 days) visit within the authorizing provider's specialty     Patient has had an office visit with the authorizing provider or a provider within the authorizing providers department within the previous 12 mos or has a future within next 30 days. See \"Patient Info\" tab in inbasket, or \"Choose Columns\" in Meds & Orders section of the refill encounter.              Passed - Medication is active on med list        Passed - Normal serum creatinine on file in past 12 months     Recent Labs   Lab Test 04/04/22  0934   CR 0.68       Ok to refill medication if creatinine is low               Lexis Agustin RN 11/04/22 1:15 PM  "

## 2022-11-21 DIAGNOSIS — I10 BENIGN ESSENTIAL HYPERTENSION: ICD-10-CM

## 2022-11-21 DIAGNOSIS — E11.9 TYPE 2 DIABETES MELLITUS WITHOUT COMPLICATION, WITHOUT LONG-TERM CURRENT USE OF INSULIN (H): ICD-10-CM

## 2022-11-21 RX ORDER — LISINOPRIL 20 MG/1
TABLET ORAL
Qty: 90 TABLET | Refills: 2 | Status: SHIPPED | OUTPATIENT
Start: 2022-11-21 | End: 2023-02-09

## 2022-11-21 NOTE — TELEPHONE ENCOUNTER
"Last Written Prescription Date:  9/7/21  Last Fill Quantity: 90,  # refills: 3   Last office visit provider:  4/12/22     Requested Prescriptions   Pending Prescriptions Disp Refills     lisinopril (ZESTRIL) 20 MG tablet [Pharmacy Med Name: LISINOPRIL TAB 20MG] 90 tablet 3     Sig: TAKE 1 TABLET DAILY       ACE Inhibitors (Including Combos) Protocol Passed - 11/21/2022  1:11 AM        Passed - Blood pressure under 140/90 in past 12 months     BP Readings from Last 3 Encounters:   04/12/22 116/80   04/04/22 122/76   02/02/22 (!) 148/78                 Passed - Recent (12 mo) or future (30 days) visit within the authorizing provider's specialty     Patient has had an office visit with the authorizing provider or a provider within the authorizing providers department within the previous 12 mos or has a future within next 30 days. See \"Patient Info\" tab in inbasket, or \"Choose Columns\" in Meds & Orders section of the refill encounter.              Passed - Medication is active on med list        Passed - Patient is age 18 or older        Passed - Normal serum creatinine on file in past 12 months     Recent Labs   Lab Test 04/04/22  0934   CR 0.68       Ok to refill medication if creatinine is low          Passed - Normal serum potassium on file in past 12 months     Recent Labs   Lab Test 04/04/22  0934   POTASSIUM 4.2                  Chelsey Hernandez RN 11/21/22 2:49 PM  "

## 2022-12-04 ASSESSMENT — ENCOUNTER SYMPTOMS
HEADACHES: 1
DIARRHEA: 0
MYALGIAS: 0
ARTHRALGIAS: 0
FREQUENCY: 0
PARESTHESIAS: 0
FEVER: 0
HEMATURIA: 0
WEAKNESS: 0
NERVOUS/ANXIOUS: 0
NAUSEA: 0
SORE THROAT: 0
COUGH: 1
HEMATOCHEZIA: 0
SHORTNESS OF BREATH: 0
CONSTIPATION: 0
CHILLS: 0
DIZZINESS: 0
EYE PAIN: 0
DYSURIA: 0
JOINT SWELLING: 0
ABDOMINAL PAIN: 0
HEARTBURN: 0
PALPITATIONS: 0

## 2022-12-04 ASSESSMENT — ACTIVITIES OF DAILY LIVING (ADL): CURRENT_FUNCTION: NO ASSISTANCE NEEDED

## 2022-12-05 ENCOUNTER — OFFICE VISIT (OUTPATIENT)
Dept: FAMILY MEDICINE | Facility: CLINIC | Age: 67
End: 2022-12-05
Payer: MEDICARE

## 2022-12-05 VITALS
SYSTOLIC BLOOD PRESSURE: 134 MMHG | HEART RATE: 76 BPM | TEMPERATURE: 96.7 F | RESPIRATION RATE: 18 BRPM | HEIGHT: 71 IN | WEIGHT: 224 LBS | BODY MASS INDEX: 31.36 KG/M2 | OXYGEN SATURATION: 96 % | DIASTOLIC BLOOD PRESSURE: 82 MMHG

## 2022-12-05 DIAGNOSIS — E11.9 TYPE 2 DIABETES MELLITUS WITHOUT COMPLICATION, WITHOUT LONG-TERM CURRENT USE OF INSULIN (H): ICD-10-CM

## 2022-12-05 DIAGNOSIS — R09.82 POSTNASAL DRIP: ICD-10-CM

## 2022-12-05 DIAGNOSIS — Z00.00 ENCOUNTER FOR MEDICARE ANNUAL WELLNESS EXAM: Primary | ICD-10-CM

## 2022-12-05 PROCEDURE — 99207 PR FOOT EXAM NO CHARGE: CPT | Performed by: FAMILY MEDICINE

## 2022-12-05 PROCEDURE — G0439 PPPS, SUBSEQ VISIT: HCPCS | Performed by: FAMILY MEDICINE

## 2022-12-05 PROCEDURE — 99213 OFFICE O/P EST LOW 20 MIN: CPT | Mod: 25 | Performed by: FAMILY MEDICINE

## 2022-12-05 RX ORDER — GLIPIZIDE 2.5 MG/1
2.5 TABLET, EXTENDED RELEASE ORAL DAILY
Qty: 90 TABLET | Refills: 1 | Status: SHIPPED | OUTPATIENT
Start: 2022-12-05 | End: 2023-02-13

## 2022-12-05 RX ORDER — FLUTICASONE PROPIONATE 50 MCG
1 SPRAY, SUSPENSION (ML) NASAL DAILY
Start: 2022-12-05

## 2022-12-05 ASSESSMENT — ENCOUNTER SYMPTOMS
FEVER: 0
DIARRHEA: 0
CONSTIPATION: 0
ARTHRALGIAS: 0
FREQUENCY: 0
JOINT SWELLING: 0
WEAKNESS: 0
CHILLS: 0
PALPITATIONS: 0
HEMATOCHEZIA: 0
HEARTBURN: 0
SORE THROAT: 0
PARESTHESIAS: 0
MYALGIAS: 0
SHORTNESS OF BREATH: 0
HEADACHES: 1
NAUSEA: 0
ABDOMINAL PAIN: 0
DIZZINESS: 0
COUGH: 1
HEMATURIA: 0
DYSURIA: 0
EYE PAIN: 0
NERVOUS/ANXIOUS: 0

## 2022-12-05 ASSESSMENT — PAIN SCALES - GENERAL: PAINLEVEL: NO PAIN (0)

## 2022-12-05 ASSESSMENT — ACTIVITIES OF DAILY LIVING (ADL): CURRENT_FUNCTION: NO ASSISTANCE NEEDED

## 2022-12-05 NOTE — PROGRESS NOTES
"SUBJECTIVE:   SLICK is a 67 year old who presents for Preventive Visit.  Patient has been advised of split billing requirements and indicates understanding: Yes  Are you in the first 12 months of your Medicare coverage?  No    Healthy Habits:     In general, how would you rate your overall health?  Good    Frequency of exercise:  2-3 days/week    Duration of exercise:  15-30 minutes    Do you usually eat at least 4 servings of fruit and vegetables a day, include whole grains    & fiber and avoid regularly eating high fat or \"junk\" foods?  No    Taking medications regularly:  Yes    Medication side effects:  Not applicable    Ability to successfully perform activities of daily living:  No assistance needed    Home Safety:  No safety concerns identified    Hearing Impairment:  Difficulty following a conversation in a noisy restaurant or crowded room, difficulty following dialogue in the theater and find that men's voices are easier to understand than woman's    In the past 6 months, have you been bothered by leaking of urine?  No    In general, how would you rate your overall mental or emotional health?  Good      PHQ-2 Total Score: 0    Additional concerns today:  No    Patient denies needing to repeat hearing test yet as it has not been worsening since his last audiology testing several years ago.    Diabetes Follow-up      How often are you checking your blood sugar? Not at all    What concerns do you have today about your diabetes? None     Do you have any of these symptoms? (Select all that apply)  No numbness or tingling in feet.  No redness, sores or blisters on feet.  No complaints of excessive thirst.  No reports of blurry vision.  No significant changes to weight.      BP Readings from Last 2 Encounters:   12/05/22 134/82   04/12/22 116/80     Hemoglobin A1C (%)   Date Value   10/26/2022 7.1 (H)   04/04/2022 7.2 (H)   07/01/2021 6.1 (H)   11/10/2020 6.3 (H)     LDL Cholesterol Calculated (mg/dL)   Date Value "   2022 29   2021 47   10/22/2020 47     Have you ever done Advance Care Planning? (For example, a Health Directive, POLST, or a discussion with a medical provider or your loved ones about your wishes): Yes, patient states has an Advance Care Planning document and will bring a copy to the clinic.      Fall risk  Fallen 2 or more times in the past year?: No  Any fall with injury in the past year?: No    Cognitive Screening   1) Repeat 3 items (Leader, Season, Table)    2) Clock draw: NORMAL  3) 3 item recall: Recalls 3 objects  Results: 3 items recalled: COGNITIVE IMPAIRMENT LESS LIKELY    Mini-CogTM Copyright S Asa. Licensed by the author for use in [facility]; reprinted with permission ([email or web address]). All rights reserved.      Do you have sleep apnea, excessive snoring or daytime drowsiness?: no    Reviewed and updated as needed this visit by clinical staff   Tobacco  Allergies  Meds  Problems           Reviewed and updated as needed this visit by Provider    Allergies  Meds  Problems            Social History     Tobacco Use     Smoking status: Former     Packs/day: 1.50     Years: 10.00     Pack years: 15.00     Types: Cigarettes     Start date: 10/1/1972     Quit date: 10/1/1992     Years since quittin.1     Smokeless tobacco: Never   Substance Use Topics     Alcohol use: Not Currently     If you drink alcohol do you typically have >3 drinks per day or >7 drinks per week? No    Alcohol Use 2022   Prescreen: >3 drinks/day or >7 drinks/week? -   Prescreen: >3 drinks/day or >7 drinks/week? No       Current providers sharing in care for this patient include:   Patient Care Team:  Mo Granados MD as PCP - General (Family Medicine)  Mo Granados MD as Assigned PCP  Jules Garcia MD as MD (Urology)  Jules Garcia MD as Assigned Surgical Provider    The following health maintenance items are reviewed in Epic and correct as of  today:  Health Maintenance   Topic Date Due     ZOSTER IMMUNIZATION (2 of 2) 2016     BMP  2023     LIPID  2023     MICROALBUMIN  2023     A1C  2023     MEDICARE ANNUAL WELLNESS VISIT  2023     DIABETIC FOOT EXAM  2023     ANNUAL REVIEW OF HM ORDERS  2023     FALL RISK ASSESSMENT  2023     DTAP/TDAP/TD IMMUNIZATION (3 - Td or Tdap) 2025     ADVANCE CARE PLANNING  2027     COLORECTAL CANCER SCREENING  2029     HEPATITIS C SCREENING  Completed     PHQ-2 (once per calendar year)  Completed     INFLUENZA VACCINE  Completed     Pneumococcal Vaccine: 65+ Years  Completed     AORTIC ANEURYSM SCREENING (SYSTEM ASSIGNED)  Completed     COVID-19 Vaccine  Completed     IPV IMMUNIZATION  Aged Out     MENINGITIS IMMUNIZATION  Aged Out     EYE EXAM  Discontinued     Patient Active Problem List   Diagnosis     Benign neoplasm of colon     Coronary artery calcification seen on CAT scan     Diabetes mellitus, type 2 (H)     Genital herpes     Hyperlipemia     Hypertension     Right thyroid nodule     Lightheadedness     Past Surgical History:   Procedure Laterality Date     JOINT REPLACEMENT      left and right total hip replacement      ORTHOPEDIC SURGERY  2012    both total hip     TOTAL HIP ARTHROPLASTY Left 2012     TOTAL HIP ARTHROPLASTY Right        Social History     Tobacco Use     Smoking status: Former     Packs/day: 1.50     Years: 10.00     Pack years: 15.00     Types: Cigarettes     Start date: 10/1/1972     Quit date: 10/1/1992     Years since quittin.1     Smokeless tobacco: Never   Substance Use Topics     Alcohol use: Not Currently     Family History   Problem Relation Age of Onset     Diabetes Paternal Aunt      Arthritis Maternal Aunt         Rheumatoid     Hypertension Mother      Heart Disease Mother          Current Outpatient Medications   Medication Sig Dispense Refill     acyclovir (ZOVIRAX) 400 MG tablet TAKE 1 TABLET TWICE A   tablet 3     aspirin (ASA) 81 MG EC tablet Take 81 mg by mouth       atorvastatin (LIPITOR) 40 MG tablet TAKE 1 TABLET DAILY 90 tablet 3     Calcium Carbonate (CALCIUM 600 PO) Take by mouth daily       chlorthalidone (HYGROTON) 25 MG tablet Take 0.5 tablets (12.5 mg) by mouth daily 45 tablet 3     cholecalciferol (VITAMIN D3) 25 mcg (1000 units) capsule Take 1,000 Units by mouth       Cyanocobalamin (VITAMIN B12 PO)        fluticasone (FLONASE) 50 MCG/ACT nasal spray Spray 1 spray into both nostrils daily       glipiZIDE (GLUCOTROL XL) 2.5 MG 24 hr tablet Take 1 tablet (2.5 mg) by mouth daily 90 tablet 1     lisinopril (ZESTRIL) 20 MG tablet TAKE 1 TABLET DAILY 90 tablet 2     sildenafil (VIAGRA) 100 MG tablet Take 1 tablet (100 mg) by mouth daily as needed (sex) 30 tablet 11     amoxicillin (AMOXIL) 500 MG capsule Take 4 capsules (2,000 mg) by mouth once as needed (prior to dental procedure) Patient has three dental appointments in May 2022. (Patient not taking: Reported on 12/5/2022) 12 capsule 0     Allergies   Allergen Reactions     Metformin Itching     Perianal itch and some erythema and skin itch on arms etc.      Pneumonia Vaccine: completed    Review of Systems   Constitutional: Negative for chills and fever.   HENT: Positive for congestion and ear pain. Negative for hearing loss and sore throat.    Eyes: Negative for pain and visual disturbance.   Respiratory: Positive for cough. Negative for shortness of breath.    Cardiovascular: Negative for chest pain, palpitations and peripheral edema.   Gastrointestinal: Negative for abdominal pain, constipation, diarrhea, heartburn, hematochezia and nausea.   Genitourinary: Negative for dysuria, frequency, genital sores, hematuria, impotence, penile discharge and urgency.   Musculoskeletal: Negative for arthralgias, joint swelling and myalgias.   Skin: Negative for rash.   Neurological: Positive for headaches. Negative for dizziness, weakness and  "paresthesias.   Psychiatric/Behavioral: Negative for mood changes. The patient is not nervous/anxious.          OBJECTIVE:   /82   Pulse 76   Temp (!) 96.7  F (35.9  C) (Tympanic)   Resp 18   Ht 1.797 m (5' 10.75\")   Wt 101.6 kg (224 lb)   SpO2 96%   BMI 31.46 kg/m   Estimated body mass index is 31.46 kg/m  as calculated from the following:    Height as of this encounter: 1.797 m (5' 10.75\").    Weight as of this encounter: 101.6 kg (224 lb).  Physical Exam  GENERAL APPEARANCE: healthy, alert and no distress  EYES: pink conj, no icterus, PERRL, EOMI  HENT: ear canals and TM's normal, nose and mouth without ulcers or lesions, oropharynx clear and oral mucous membranes moist  NECK: no adenopathy, no asymmetry, masses, or scars and thyroid normal to palpation  RESP: lungs clear to auscultation - no rales, rhonchi or wheezes  CV: regular rates and rhythm, normal S1 S2, no S3 or S4, no murmur, click or rub, no peripheral edema and peripheral pulses strong  ABDOMEN: soft, nontender, no hepatosplenomegaly, no masses and bowel sounds normal  RECTAL: deferred  MS: no musculoskeletal defects are noted and gait is age appropriate without ataxia  SKIN: no suspicious lesions or rashes  NEURO: Normal strength and tone, sensory exam grossly normal, mentation intact and speech normal    Diagnostic Test Results:  none     ASSESSMENT / PLAN:   Jules was seen today for wellness visit.    Diagnoses and all orders for this visit:    Encounter for Medicare annual wellness exam  Patient was advised on recommended screening and preventive health recommendations.  He verbalized understanding and agreed to the plans below.    Type 2 diabetes mellitus without complication, without long-term current use of insulin (H)  -     FOOT EXAM  -     glipiZIDE (GLUCOTROL XL) 2.5 MG 24 hr tablet; Take 1 tablet (2.5 mg) by mouth daily  -     Hemoglobin A1c; Future  -     Basic metabolic panel; Future  -     OFFICE/OUTPT VISIT,EST,LEVL " "IV  Patient was advised his A1c has been above 7% on two measurements now. Discussed with him possible complications of uncontrolled Dm.  Discussed medical treatment. Patient verifies with provider he developed itchy rash all over few days after he started metformin years ago - rash resolved when med was stopped..  Discussed with patient possible use of low dose glipizide to optimize DM management. Discussed possible ADR. Patient agreed to try.  Reinforced carbohydrate control.  Regular exercise 30 mins per day, 3 times a week.  Regular foot care, annual eye exam.  Flu vaccine every year.    Postnasal drip  -     OFFICE/OUTPT VISIT,EST,LEVL IV  -     fluticasone (FLONASE) 50 MCG/ACT nasal spray; Spray 1 spray into both nostrils daily  Possible cause of his recurrent mild cough.  Advised use of flonase for a few weeks. Patient concurs.  Return precautions discussed and given to patient.     Other orders  -     REVIEW OF HEALTH MAINTENANCE PROTOCOL ORDERS        Patient has been advised of split billing requirements and indicates understanding: Yes      COUNSELING:  Reviewed preventive health counseling, as reflected in patient instructions      BMI:   Estimated body mass index is 31.46 kg/m  as calculated from the following:    Height as of this encounter: 1.797 m (5' 10.75\").    Weight as of this encounter: 101.6 kg (224 lb).   Weight management plan: Discussed healthy diet and exercise guidelines      He reports that he quit smoking about 30 years ago. His smoking use included cigarettes. He started smoking about 50 years ago. He has a 15.00 pack-year smoking history. He has never used smokeless tobacco.      Appropriate preventive services were discussed with this patient, including applicable screening as appropriate for cardiovascular disease, diabetes, osteopenia/osteoporosis, and glaucoma.  As appropriate for age/gender, discussed screening for colorectal cancer, prostate cancer, breast cancer, and cervical " cancer. Checklist reviewing preventive services available has been given to the patient.    Reviewed patients plan of care and provided an AVS. The Basic Care Plan (routine screening as documented in Health Maintenance) for Jules meets the Care Plan requirement. This Care Plan has been established and reviewed with the Patient.      Mo Granados MD  [facility]    Identified Health Risks:    The patient was counseled and encouraged to consider modifying their diet and eating habits. He was provided with information on recommended healthy diet options.  The patient was provided with written information regarding signs of hearing loss.

## 2022-12-05 NOTE — PATIENT INSTRUCTIONS
You agreed to start glipizide XL 2.5 mg orally with breakfast daily.  Measure blood sugars before breakfast, and as needed later in the day if you have symptoms of low or high blood sugars.  Let Dr. Granados know if you need new blood sugar meter prescription ASAP.    Be consistent with low salt, low trans fat and low saturated fat diet.  Eat food rich in omega-3-fatty acids as you tolerate. (salmon, olive oil)  Eat 5 cups of vegetables, fruits and whole grains per day.  Limit starchy food (white rice, white bread, white pasta, white potatoes) to less than a cup per meal.  Minimize sweets, junk food and fastfood. Limit soda beverages to one serving per day; best to avoid it altogether though.    Exercise: moderate intensity sustained for at least 30 mins per episode, goal of 150 mins per week at least  Combine cardiovascular and resistance exercises.  These exercise recommendations are in addition to your daily activity at work or home.  Work on losing weight.    Regular foot care; don't walk barefoot  Annual eye exam.  Please request your eye doctor to furnish a copy of the eye exam report to the clinic to be placed in your record.  Flu vaccine by the end of October yearly.  Be sure to update any other recommended vaccinations for diabetics.    Blood tests: schedule repeat A1c and BMP in 3 months.    You may try flonase nasal spray 1 spray to each nostril daily for 2-3 weeks to help reduce postnasal drip that can cause recurrent cough.    You may get the Shingrix vaccine for shingles if you desire, and after you verify with insurance how they cover the vaccine.     Preventative Care Visits include: Yearly physicals, Well-child visits, Welcome to Medicare visits, & Medicare yearly wellness exams.    The purpose of these visits is to discuss your medical history and prevent health problems before you are sick.  You may need to pay a copay, coinsurance or deductible if your visit today includes testing or treating for a  new or existing condition.    Additional charges may be incurred for today's visit. If you have questions about what your insurance plan covers, please contact your Insurance Company's member service department.  If you have questions specific to a bill you have already received from [employer], please contact the [employer] Billing Office at [phone].       Patient Education   Personalized Prevention Plan  You are due for the preventive services outlined below.  Your care team is available to assist you in scheduling these services.  If you have already completed any of these items, please share that information with your care team to update in your medical record.  Health Maintenance Due   Topic Date Due    Zoster (Shingles) Vaccine (2 of 2) 03/18/2016    Diabetic Foot Exam  12/07/2022    ANNUAL REVIEW OF HM ORDERS  12/07/2022     Preventive Health Recommendations  See your health care provider every year to  Review health changes.   Discuss preventive care.    Review your medicines if your doctor has prescribed any.  Talk with your health care provider about whether you should have a test to screen for prostate cancer (PSA).  Every 3 years, have a diabetes test (fasting glucose). If you are at risk for diabetes, you should have this test more often.  Every 5 years, have a cholesterol test. Have this test more often if you are at risk for high cholesterol or heart disease.   Every 10 years, have a colonoscopy. Or, have a yearly FIT test (stool test). These exams will check for colon cancer.  Talk to with your health care provider about screening for Abdominal Aortic Aneurysm if you have a family history of AAA or have a history of smoking.    Shots:   Get a flu shot each year.   Get a tetanus shot every 10 years.   Talk to your doctor about your pneumonia vaccines. There are now two you should receive - Pneumovax (PPSV 23) and Prevnar (PCV 13).  Talk to your pharmacist about a shingles vaccine.   Talk to your  doctor about the hepatitis B vaccine.    Nutrition:   Eat at least 5 servings of fruits and vegetables each day.   Eat whole-grain bread, whole-wheat pasta and brown rice instead of white grains and rice.   Get adequate Calcium and Vitamin D.     Lifestyle  Exercise for at least 150 minutes a week (30 minutes a day, 5 days a week). This will help you control your weight and prevent disease.   Limit alcohol to one drink per day.   No smoking.   Wear sunscreen to prevent skin cancer.   See your dentist every six months for an exam and cleaning.   See your eye doctor every 1 to 2 years to screen for conditions such as glaucoma, macular degeneration and cataracts.    Personalized Prevention Plan  You are due for the preventive services outlined below.  Your care team is available to assist you in scheduling these services.  If you have already completed any of these items, please share that information with your care team to update in your medical record.  Health Maintenance   Topic Date Due    ZOSTER IMMUNIZATION (2 of 2) 03/18/2016    DIABETIC FOOT EXAM  12/07/2022    ANNUAL REVIEW OF HM ORDERS  12/07/2022    BMP  04/04/2023    LIPID  04/04/2023    MICROALBUMIN  04/04/2023    A1C  04/26/2023    MEDICARE ANNUAL WELLNESS VISIT  12/05/2023    FALL RISK ASSESSMENT  12/05/2023    DTAP/TDAP/TD IMMUNIZATION (3 - Td or Tdap) 12/30/2025    ADVANCE CARE PLANNING  12/05/2027    COLORECTAL CANCER SCREENING  09/19/2029    HEPATITIS C SCREENING  Completed    PHQ-2 (once per calendar year)  Completed    INFLUENZA VACCINE  Completed    Pneumococcal Vaccine: 65+ Years  Completed    AORTIC ANEURYSM SCREENING (SYSTEM ASSIGNED)  Completed    COVID-19 Vaccine  Completed    IPV IMMUNIZATION  Aged Out    MENINGITIS IMMUNIZATION  Aged Out    EYE EXAM  Discontinued       Understanding USDA MyPlate  The USDA has guidelines to help you make healthy food choices. These are called MyPlate. MyPlate shows the food groups that make up healthy meals  using the image of a place setting. Before you eat, think about the healthiest choices for what to put on your plate or in your cup or bowl. To learn more about building a healthy plate, visit www.choosemyplate.gov.    The food groups  Fruits. Any fruit or 100% fruit juice counts as part of the Fruit Group. Fruits may be fresh, canned, frozen, or dried, and may be whole, cut-up, or pureed. Make 1/2 of your plate fruits and vegetables.  Vegetables. Any vegetable or 100% vegetable juice counts as a member of the Vegetable Group. Vegetables may be fresh, frozen, canned, or dried. They can be served raw or cooked and may be whole, cut-up, or mashed. Make 1/2 of your plate fruits and vegetables.  Grains. All foods made from grains are part of the Grains Group. These include wheat, rice, oats, cornmeal, and barley. Grains are often used to make foods such as bread, pasta, oatmeal, cereal, tortillas, and grits. Grains should be no more than 1/4 of your plate. At least half of your grains should be whole grains.  Protein. This group includes meat, poultry, seafood, beans and peas, eggs, processed soy products (such as tofu), nuts (including nut butters), and seeds. Make protein choices no more than 1/4 of your plate. Meat and poultry choices should be lean or low fat.  Dairy. The Dairy Group includes all fluid milk products and foods made from milk that contain calcium, such as yogurt and cheese. (Foods that have little calcium, such as cream, butter, and cream cheese, are not part of this group.) Most dairy choices should be low-fat or fat-free.  Oils. Oils aren't a food group, but they do contain essential nutrients. However it's important to watch your intake of oils. These are fats that are liquid at room temperature. They include canola, corn, olive, soybean, vegetable, and sunflower oil. Foods that are mainly oil include mayonnaise, certain salad dressings, and soft margarines. You likely already get your daily oil  allowance from the foods you eat.  Things to limit  Eating healthy also means limiting these things in your diet:     Salt (sodium). Many processed foods have a lot of sodium. To keep sodium intake down, eat fresh vegetables, meats, poultry, and seafood when possible. Purchase low-sodium, reduced-sodium, or no-salt-added food products at the store. And don't add salt to your meals at home. Instead, season them with herbs and spices such as dill, oregano, cumin, and paprika. Or try adding flavor with lemon or lime zest and juice.  Saturated fat. Saturated fats are most often found in animal products such as beef, pork, and chicken. They are often solid at room temperature, such as butter. To reduce your saturated fat intake, choose leaner cuts of meat and poultry. And try healthier cooking methods such as grilling, broiling, roasting, or baking. For a simple lower-fat swap, use plain nonfat yogurt instead of mayonnaise when making potato salad or macaroni salad.  Added sugars. These are sugars added to foods. They are in foods such as ice cream, candy, soda, fruit drinks, sports drinks, energy drinks, cookies, pastries, jams, and syrups. Cut down on added sugars by sharing sweet treats with a family member or friend. You can also choose fruit for dessert, and drink water or other unsweetened beverages.     [employer] last reviewed this educational content on 6/1/2020 2000-2021 [employer]. All rights reserved. This information is not intended as a substitute for professional medical care. Always follow your healthcare professional's instructions.          Signs of Hearing Loss      Hearing much better with one ear can be a sign of hearing loss.   Hearing loss is a problem shared by many people. In fact, it is one of the most common health problems, particularly as people age. Most people age 65 and older have some hearing loss. By age 80, almost everyone does. Hearing loss often occurs slowly over the  years. So you may not realize your hearing has gotten worse.  Have your hearing checked  Call your healthcare provider if you:  Have to strain to hear normal conversation  Have to watch other people s faces very carefully to follow what they re saying  Need to ask people to repeat what they ve said  Often misunderstand what people are saying  Turn the volume of the television or radio up so high that others complain  Feel that people are mumbling when they re talking to you  Find that the effort to hear leaves you feeling tired and irritated  Notice, when using the phone, that you hear better with one ear than the other  [employer] last reviewed this educational content on 1/1/2020 2000-2021 [employer]. All rights reserved. This information is not intended as a substitute for professional medical care. Always follow your healthcare professional's instructions.

## 2022-12-06 DIAGNOSIS — E11.9 TYPE 2 DIABETES MELLITUS WITHOUT COMPLICATION, WITHOUT LONG-TERM CURRENT USE OF INSULIN (H): Primary | ICD-10-CM

## 2022-12-06 NOTE — TELEPHONE ENCOUNTER
Reason for call:    Symptom or request:     Patient called looking for testing supplies glucose control serum and accucheck pat test strips.     [facility]         Best Time:  any    Can we leave a detailed message on this number?  YES     Cheri BRITT  Station

## 2022-12-09 NOTE — TELEPHONE ENCOUNTER
See request below for glucometer control solution and test strips. No current orders noted for these supplies, order started/pended, and routed to PCP for review.    Mary Beth Castillo RN  [facility]

## 2022-12-12 RX ORDER — BLOOD-GLUCOSE CONTROL, NORMAL
EACH MISCELLANEOUS
Qty: 1 EACH | Refills: 11 | Status: SHIPPED | OUTPATIENT
Start: 2022-12-12

## 2023-01-16 NOTE — TELEPHONE ENCOUNTER
Dr Granados    Patient is having some dental work done soon and wants to know if he should have a antibiotic ordered.    It was 6 years ago that he was told by   [facility] to take antibiotic pre dental.  He's had 2 hip replacements.  He does not have that orthopedic surgeons name anymore.    Please advise.   Attending Only

## 2023-05-08 ENCOUNTER — TELEPHONE (OUTPATIENT)
Dept: FAMILY MEDICINE | Facility: CLINIC | Age: 68
End: 2023-05-08
Payer: MEDICARE

## 2023-05-08 NOTE — TELEPHONE ENCOUNTER
Medication Question or Refill    Contacts       Type Contact Phone/Fax    05/08/2023 10:02 AM CDT Phone (Incoming) SLICK Cheng (Self) [phone] (H)          What medication are you calling about (include dose and sig)?: Amoxicillin. Amoxicillin, 12 capsules,  sent to [facility] for patient's dental work on May 16, 2023. He has 2 artificial hips.      Preferred Pharmacy:     [place] [id number] [place] [address] [address]  Phone: [phone] Fax: [phone]    Controlled Substance Agreement on file:   CSA -- Patient Level:    CSA: None found at the patient level.       Who prescribed the medication?: Dr. Granados    Do you need a refill? Yes    When did you use the medication last? Last dental appointment.    Patient offered an appointment? No    Do you have any questions or concerns?  No      Could we send this information to you in [facility] or would you prefer to receive a phone call?:   Patient would prefer a phone call   Okay to leave a detailed message?: Yes at Home number on file [phone] (home)

## 2023-05-15 DIAGNOSIS — Z96.643 HISTORY OF BILATERAL HIP REPLACEMENTS: ICD-10-CM

## 2023-05-15 DIAGNOSIS — Z96.649 HISTORY OF HIP REPLACEMENT, UNSPECIFIED LATERALITY: ICD-10-CM

## 2023-05-15 DIAGNOSIS — Z79.2 NEED FOR PROPHYLACTIC ANTIBIOTIC: Primary | ICD-10-CM

## 2023-05-15 RX ORDER — AMOXICILLIN 500 MG/1
CAPSULE ORAL
Qty: 4 CAPSULE | Refills: 0 | Status: SHIPPED | OUTPATIENT
Start: 2023-05-15 | End: 2023-10-09

## 2023-05-15 NOTE — TELEPHONE ENCOUNTER
"Routing refill request to provider for review/approval because:  Drug not on the [facility] refill protocol       Pending Prescriptions:                       Disp   Refills    amoxicillin (AMOXIL) 500 MG capsule [Phar*12 cap*0            Sig: TAKE 4 CAPSULES (2,000 MG) BY MOUTH ONCE AS           NEEDED (PRIOR TO DENTAL PROCEDURE)      Requested Prescriptions   Pending Prescriptions Disp Refills     amoxicillin (AMOXIL) 500 MG capsule [Pharmacy Med Name: AMOXICILLIN 500 MG CAPSULE] 12 capsule 0     Sig: TAKE 4 CAPSULES (2,000 MG) BY MOUTH ONCE AS NEEDED (PRIOR TO DENTAL PROCEDURE)       Oral Acne/Rosacea Medications Protocol Failed - 5/15/2023 10:26 AM        Failed - Confirmation of diagnosis is required     Please confirm diagnosis is acne or rosacea.     If NOT acne or rosacea; refer request to provider for further evaluation.    If diagnosis IS acne or rosacea, OK to refill BASED ON PREVIOUS REFILL CLINICAL NOTE RECOMMENDATION.          Passed - Patient is 12 years of age or older        Passed - Recent (12 mo) or future (30 days) visit within the authorizing provider's specialty     Patient has had an office visit with the authorizing provider or a provider within the authorizing providers department within the previous 12 mos or has a future within next 30 days. See \"Patient Info\" tab in inbasket, or \"Choose Columns\" in Meds & Orders section of the refill encounter.              Passed - Medication is active on med list           Agnes Horner RN on 5/15/2023 at 1:12 PM    "

## 2023-05-27 ENCOUNTER — HEALTH MAINTENANCE LETTER (OUTPATIENT)
Age: 68
End: 2023-05-27

## 2023-06-28 DIAGNOSIS — E11.9 TYPE 2 DIABETES MELLITUS WITHOUT COMPLICATION, WITHOUT LONG-TERM CURRENT USE OF INSULIN (H): ICD-10-CM

## 2023-06-28 RX ORDER — GLIPIZIDE 2.5 MG/1
2.5 TABLET, EXTENDED RELEASE ORAL DAILY
Qty: 90 TABLET | Refills: 0 | Status: SHIPPED | OUTPATIENT
Start: 2023-06-28 | End: 2023-07-27

## 2023-06-28 NOTE — TELEPHONE ENCOUNTER
"Routing refill request to provider for review/approval because:  Labs not current:  a1c, creat  Patient needs to be seen because:  due for 6 month visit  My chart message sent  Pending Prescriptions:                       Disp   Refills    glipiZIDE (GLUCOTROL XL) 2.5 MG 24 hr tabl*90 tab*3        Sig: TAKE 1 TABLET BY MOUTH DAILY      Requested Prescriptions   Pending Prescriptions Disp Refills    glipiZIDE (GLUCOTROL XL) 2.5 MG 24 hr tablet [Pharmacy Med Name: glipiZIDE ER 2.5 MG Oral Tablet Extended Release 24 Hour] 90 tablet 3     Sig: TAKE 1 TABLET BY MOUTH DAILY       Sulfonylurea Agents Failed - 6/28/2023  3:13 AM        Failed - Patient has documented A1c within the specified period of time.     If HgbA1C is 8 or greater, it needs to be on file within the past 3 months.  If less than 8, must be on file within the past 6 months.     Recent Labs   Lab Test 10/26/22  0704   A1C 7.1*             Failed - Patient has a recent creatinine (normal) within the past 12 mos.     Recent Labs   Lab Test 04/04/22  0934   CR 0.68       Ok to refill medication if creatinine is low          Failed - Recent (6 mo) or future (30 days) visit within the authorizing provider's specialty     Patient had office visit in the last 6 months or has a visit in the next 30 days with authorizing provider or within the authorizing provider's specialty.  See \"Patient Info\" tab in inbasket, or \"Choose Columns\" in Meds & Orders section of the refill encounter.            Passed - Medication is active on med list        Passed - Patient is age 18 or older           Agnes Horner RN on 6/28/2023 at 1:19 PM    "

## 2023-07-07 ENCOUNTER — LAB (OUTPATIENT)
Dept: LAB | Facility: CLINIC | Age: 68
End: 2023-07-07
Payer: COMMERCIAL

## 2023-07-07 DIAGNOSIS — E11.9 TYPE 2 DIABETES MELLITUS WITHOUT COMPLICATION, WITHOUT LONG-TERM CURRENT USE OF INSULIN (H): ICD-10-CM

## 2023-07-07 LAB
ANION GAP SERPL CALCULATED.3IONS-SCNC: 9 MMOL/L (ref 7–15)
BUN SERPL-MCNC: 21.3 MG/DL (ref 8–23)
CALCIUM SERPL-MCNC: 9.7 MG/DL (ref 8.8–10.2)
CHLORIDE SERPL-SCNC: 105 MMOL/L (ref 98–107)
CHOLEST SERPL-MCNC: 111 MG/DL
CREAT SERPL-MCNC: 0.78 MG/DL (ref 0.67–1.17)
CREAT UR-MCNC: 170.5 MG/DL
DEPRECATED HCO3 PLAS-SCNC: 27 MMOL/L (ref 22–29)
GFR SERPL CREATININE-BSD FRML MDRD: >90 ML/MIN/1.73M2
GLUCOSE SERPL-MCNC: 159 MG/DL (ref 70–99)
HBA1C MFR BLD: 7.5 % (ref 0–5.6)
HDLC SERPL-MCNC: 31 MG/DL
LDLC SERPL CALC-MCNC: 55 MG/DL
MICROALBUMIN UR-MCNC: <12 MG/L
MICROALBUMIN/CREAT UR: NORMAL MG/G{CREAT}
NONHDLC SERPL-MCNC: 80 MG/DL
POTASSIUM SERPL-SCNC: 4.4 MMOL/L (ref 3.4–5.3)
SODIUM SERPL-SCNC: 141 MMOL/L (ref 136–145)
TRIGL SERPL-MCNC: 127 MG/DL

## 2023-07-07 PROCEDURE — 82043 UR ALBUMIN QUANTITATIVE: CPT

## 2023-07-07 PROCEDURE — 80048 BASIC METABOLIC PNL TOTAL CA: CPT

## 2023-07-07 PROCEDURE — 82570 ASSAY OF URINE CREATININE: CPT

## 2023-07-07 PROCEDURE — 36415 COLL VENOUS BLD VENIPUNCTURE: CPT

## 2023-07-07 PROCEDURE — 80061 LIPID PANEL: CPT

## 2023-07-07 PROCEDURE — 83036 HEMOGLOBIN GLYCOSYLATED A1C: CPT

## 2023-07-27 ENCOUNTER — OFFICE VISIT (OUTPATIENT)
Dept: FAMILY MEDICINE | Facility: CLINIC | Age: 68
End: 2023-07-27
Payer: COMMERCIAL

## 2023-07-27 VITALS
BODY MASS INDEX: 31.52 KG/M2 | WEIGHT: 225.13 LBS | TEMPERATURE: 97.9 F | HEIGHT: 71 IN | DIASTOLIC BLOOD PRESSURE: 72 MMHG | SYSTOLIC BLOOD PRESSURE: 108 MMHG | OXYGEN SATURATION: 94 % | HEART RATE: 77 BPM | RESPIRATION RATE: 16 BRPM

## 2023-07-27 DIAGNOSIS — Z23 NEED FOR SHINGLES VACCINE: ICD-10-CM

## 2023-07-27 DIAGNOSIS — E66.9 NON MORBID OBESITY, UNSPECIFIED OBESITY TYPE: ICD-10-CM

## 2023-07-27 DIAGNOSIS — E11.65 TYPE 2 DIABETES MELLITUS WITH HYPERGLYCEMIA, WITHOUT LONG-TERM CURRENT USE OF INSULIN (H): Primary | ICD-10-CM

## 2023-07-27 PROCEDURE — 99214 OFFICE O/P EST MOD 30 MIN: CPT | Mod: 25 | Performed by: FAMILY MEDICINE

## 2023-07-27 PROCEDURE — 0134A COVID-19 BIVALENT 18+ (MODERNA): CPT | Performed by: FAMILY MEDICINE

## 2023-07-27 PROCEDURE — 91313 COVID-19 BIVALENT 18+ (MODERNA): CPT | Performed by: FAMILY MEDICINE

## 2023-07-27 RX ORDER — GLIPIZIDE 2.5 MG/1
2.5 TABLET, EXTENDED RELEASE ORAL DAILY
Qty: 90 TABLET | Refills: 3 | Status: SHIPPED | OUTPATIENT
Start: 2023-07-27 | End: 2024-07-03

## 2023-07-27 ASSESSMENT — PAIN SCALES - GENERAL: PAINLEVEL: NO PAIN (0)

## 2023-07-27 NOTE — PROGRESS NOTES
Assessment & Plan     Type 2 diabetes mellitus with hyperglycemia, without long-term current use of insulin (H)  Mild increase in A1c.  Identified opportunities to improve in his lifestyle: meal schedule/habits, lack of exercise.  Keep current meds.  Reinforced carbohydrate control.  Gradulaly increase exercise. Regular foot care, annual eye exam.  Flu vaccine every year.   Repeat labs in 3 months.  - glipiZIDE (GLUCOTROL XL) 2.5 MG 24 hr tablet  Dispense: 90 tablet; Refill: 3  - Hemoglobin A1c  - Basic metabolic panel    Non morbid obesity, unspecified obesity type  Patient was advised healthy diet recommendations.  Patient was advised weekly exercise recommendations and goals.     Need for shingles vaccine  Patient to check with insurance about coverage       Patient Instructions   Keep all medications unchanged.    Change your eating habits: do not skip lunch; eat diabetes approved meals (in moderate portions) 3-4 x a day.    Be consistent with low salt, low trans fat and low saturated fat diet.  Eat food rich in omega-3-fatty acids as you tolerate. (salmon, olive oil)  Eat 5 cups of vegetables, fruits and whole grains per day.  Limit starchy food (white rice, white bread, white pasta, white potatoes) to less than a cup per meal.  Minimize sweets, junk food and fastfood. Limit soda beverages to one serving per day; best to avoid it altogether though.    Exercise: moderate intensity sustained for at least 30 mins per episode, goal of 150 mins per week at least  Combine cardiovascular and resistance exercises.  These exercise recommendations are in addition to your daily activity at work or home.  Work on losing weight.      Regular foot care; don't walk barefoot  Annual eye exam.  Please request your eye doctor to furnish a copy of the eye exam report to the clinic to be placed in your record.  Flu vaccine by the end of October yearly.  Be sure to update any other recommended vaccinations for diabetics.    You  may get the Shingrix vaccine for shingles if you desire, and after you verify with insurance how they cover the vaccine.       Mo Granados MD  [facility]    Jareth KRUGER is a 67 year old, presenting for the following health issues:  Diabetes        7/27/2023     2:44 PM   Additional Questions   Roomed by Humaira LAWTON CMA   Accompanied by Self       Diabetes Follow-up  Glipizide 2.5mg every day    Hyperlipidemia Follow-Up  Atorvastatin 40mg qd    Hypertension Follow-up  Chlorthalidone 12.5mg every day, lisinopril 20mg every day    History of Present Illness       Diabetes:   He presents for follow up of diabetes.  He is checking home blood glucose a few times a month.   He checks blood glucose before meals.  Blood glucose is sometimes over 200 and never under 70. He is aware of hypoglycemia symptoms including none.    He has no concerns regarding his diabetes at this time.   He is not experiencing numbness or burning in feet, excessive thirst, blurry vision, weight changes or redness, sores or blisters on feet.           Hyperlipidemia:  He presents for follow up of hyperlipidemia.   He is taking medication to lower cholesterol. He is not having myalgia or other side effects to statin medications.    Hypertension: He presents for follow up of hypertension.  He does not check blood pressure  regularly outside of the clinic. Outpatient blood pressures have not been over 140/90. He follows a low salt diet.     He eats 2-3 servings of fruits and vegetables daily.He consumes 0 sweetened beverage(s) daily.He exercises with enough effort to increase his heart rate 30 to 60 minutes per day.  He exercises with enough effort to increase his heart rate 4 days per week.   He is taking medications regularly.         BP Readings from Last 2 Encounters:   07/27/23 108/72   12/05/22 134/82     Hemoglobin A1C (%)   Date Value   07/07/2023 7.5 (H)   10/26/2022 7.1 (H)   07/01/2021 6.1 (H)   11/10/2020 6.3  "(H)     LDL Cholesterol Calculated (mg/dL)   Date Value   07/07/2023 55   04/04/2022 29   07/01/2021 47   10/22/2020 47       Review of Systems   Constitutional, HEENT, cardiovascular, pulmonary, GI, , musculoskeletal, neuro, skin, endocrine and psych systems are negative, except as otherwise noted.      Objective    /72   Pulse 77   Temp 97.9  F (36.6  C) (Tympanic)   Resp 16   Ht 1.797 m (5' 10.75\")   Wt 102.1 kg (225 lb 2 oz)   SpO2 94%   BMI 31.62 kg/m    Body mass index is 31.62 kg/m .  Physical Exam   GENERAL:  alert and no distress, ambulatory w/o assist  EYES: no icterus; pink conjunctivae.  NECK: no tenderness, no adenopathy,  Thyroid not enlarged  RESP: lungs clear to auscultation - no rales, no rhonchi, no wheezes  CV: regular rates and rhythm, no murmur  MS: no edema  SKIN: no suspicious lesions, no rashes  NEURO: strength and tone- normal, sensory exam- grossly normal, mentation- intact, speech- normal, reflexes- symmetric  ABD:  nontender  FOOT EXAM: no ulcer/erythema; pedal pulses strong bilaterally; monofilament: no deficit either foot     Lab on 07/07/2023   Component Date Value Ref Range Status    Sodium 07/07/2023 141  136 - 145 mmol/L Final    Potassium 07/07/2023 4.4  3.4 - 5.3 mmol/L Final    Chloride 07/07/2023 105  98 - 107 mmol/L Final    Carbon Dioxide (CO2) 07/07/2023 27  22 - 29 mmol/L Final    Anion Gap 07/07/2023 9  7 - 15 mmol/L Final    Urea Nitrogen 07/07/2023 21.3  8.0 - 23.0 mg/dL Final    Creatinine 07/07/2023 0.78  0.67 - 1.17 mg/dL Final    Calcium 07/07/2023 9.7  8.8 - 10.2 mg/dL Final    Glucose 07/07/2023 159 (H)  70 - 99 mg/dL Final    GFR Estimate 07/07/2023 >90  >60 mL/min/1.73m2 Final    Cholesterol 07/07/2023 111  <200 mg/dL Final    Triglycerides 07/07/2023 127  <150 mg/dL Final    Direct Measure HDL 07/07/2023 31 (L)  >=40 mg/dL Final    LDL Cholesterol Calculated 07/07/2023 55  <=100 mg/dL Final    Non HDL Cholesterol 07/07/2023 80  <130 mg/dL Final    " Hemoglobin A1C 07/07/2023 7.5 (H)  0.0 - 5.6 % Final    Normal <5.7%   Prediabetes 5.7-6.4%    Diabetes 6.5% or higher     Note: Adopted from ADA consensus guidelines.    Creatinine Urine mg/dL 07/07/2023 170.5  mg/dL Final    The reference ranges have not been established in urine creatinine. The results should be integrated into the clinical context for interpretation.    Albumin Urine mg/L 07/07/2023 <12.0  mg/L Final    The reference ranges have not been established in urine albumin. The results should be integrated into the clinical context for interpretation.    Albumin Urine mg/g Cr 07/07/2023    Final    Unable to calculate, urine albumin and/or urine creatinine is outside detectable limits.  Microalbuminuria is defined as an albumin:creatinine ratio of 17 to 299 for males and 25 to 299 for females. A ratio of albumin:creatinine of 300 or higher is indicative of overt proteinuria.  Due to biologic variability, positive results should be confirmed by a second, first-morning random or 24-hour timed urine specimen. If there is discrepancy, a third specimen is recommended. When 2 out of 3 results are in the microalbuminuria range, this is evidence for incipient nephropathy and warrants increased efforts at glucose control, blood pressure control, and institution of therapy with an angiotensin-converting-enzyme (ACE) inhibitor (if the patient can tolerate it).

## 2023-07-27 NOTE — PATIENT INSTRUCTIONS
Keep all medications unchanged.    Change your eating habits: do not skip lunch; eat diabetes approved meals (in moderate portions) 3-4 x a day.    Be consistent with low salt, low trans fat and low saturated fat diet.  Eat food rich in omega-3-fatty acids as you tolerate. (salmon, olive oil)  Eat 5 cups of vegetables, fruits and whole grains per day.  Limit starchy food (white rice, white bread, white pasta, white potatoes) to less than a cup per meal.  Minimize sweets, junk food and fastfood. Limit soda beverages to one serving per day; best to avoid it altogether though.    Exercise: moderate intensity sustained for at least 30 mins per episode, goal of 150 mins per week at least  Combine cardiovascular and resistance exercises.  These exercise recommendations are in addition to your daily activity at work or home.  Work on losing weight.      Regular foot care; don't walk barefoot  Annual eye exam.  Please request your eye doctor to furnish a copy of the eye exam report to the clinic to be placed in your record.  Flu vaccine by the end of October yearly.  Be sure to update any other recommended vaccinations for diabetics.    You may get the Shingrix vaccine for shingles if you desire, and after you verify with insurance how they cover the vaccine.

## 2023-09-19 ENCOUNTER — TRANSFERRED RECORDS (OUTPATIENT)
Dept: HEALTH INFORMATION MANAGEMENT | Facility: CLINIC | Age: 68
End: 2023-09-19
Payer: COMMERCIAL

## 2023-10-09 ENCOUNTER — MYC REFILL (OUTPATIENT)
Dept: FAMILY MEDICINE | Facility: CLINIC | Age: 68
End: 2023-10-09
Payer: COMMERCIAL

## 2023-10-09 DIAGNOSIS — Z79.2 NEED FOR PROPHYLACTIC ANTIBIOTIC: ICD-10-CM

## 2023-10-09 DIAGNOSIS — Z96.649 HISTORY OF HIP REPLACEMENT, UNSPECIFIED LATERALITY: ICD-10-CM

## 2023-10-09 DIAGNOSIS — Z96.643 HISTORY OF BILATERAL HIP REPLACEMENTS: ICD-10-CM

## 2023-10-10 RX ORDER — AMOXICILLIN 500 MG/1
CAPSULE ORAL
Qty: 4 CAPSULE | Refills: 0 | Status: SHIPPED | OUTPATIENT
Start: 2023-10-10 | End: 2023-11-20

## 2023-11-20 ENCOUNTER — MYC REFILL (OUTPATIENT)
Dept: FAMILY MEDICINE | Facility: CLINIC | Age: 68
End: 2023-11-20
Payer: COMMERCIAL

## 2023-11-20 DIAGNOSIS — Z96.643 HISTORY OF BILATERAL HIP REPLACEMENTS: ICD-10-CM

## 2023-11-20 DIAGNOSIS — Z96.649 HISTORY OF HIP REPLACEMENT, UNSPECIFIED LATERALITY: ICD-10-CM

## 2023-11-20 DIAGNOSIS — Z79.2 NEED FOR PROPHYLACTIC ANTIBIOTIC: ICD-10-CM

## 2023-11-20 RX ORDER — AMOXICILLIN 500 MG/1
CAPSULE ORAL
Qty: 4 CAPSULE | Refills: 0 | Status: SHIPPED | OUTPATIENT
Start: 2023-11-20 | End: 2024-05-14

## 2023-12-10 DIAGNOSIS — E78.2 MIXED HYPERLIPIDEMIA: ICD-10-CM

## 2023-12-10 DIAGNOSIS — I10 BENIGN ESSENTIAL HYPERTENSION: ICD-10-CM

## 2023-12-10 DIAGNOSIS — E11.9 TYPE 2 DIABETES MELLITUS WITHOUT COMPLICATION, WITHOUT LONG-TERM CURRENT USE OF INSULIN (H): ICD-10-CM

## 2023-12-11 RX ORDER — ATORVASTATIN CALCIUM 40 MG/1
40 TABLET, FILM COATED ORAL DAILY
Qty: 90 TABLET | Refills: 3 | OUTPATIENT
Start: 2023-12-11

## 2023-12-11 RX ORDER — CHLORTHALIDONE 25 MG/1
12.5 TABLET ORAL DAILY
Qty: 45 TABLET | Refills: 3 | OUTPATIENT
Start: 2023-12-11

## 2023-12-22 DIAGNOSIS — E78.2 MIXED HYPERLIPIDEMIA: ICD-10-CM

## 2023-12-22 DIAGNOSIS — I10 BENIGN ESSENTIAL HYPERTENSION: ICD-10-CM

## 2023-12-22 DIAGNOSIS — A60.01 HERPES SIMPLEX INFECTION OF PENIS: ICD-10-CM

## 2023-12-22 DIAGNOSIS — E11.9 TYPE 2 DIABETES MELLITUS WITHOUT COMPLICATION, WITHOUT LONG-TERM CURRENT USE OF INSULIN (H): ICD-10-CM

## 2023-12-26 RX ORDER — ACYCLOVIR 400 MG/1
400 TABLET ORAL 2 TIMES DAILY
Qty: 180 TABLET | Refills: 2 | Status: SHIPPED | OUTPATIENT
Start: 2023-12-26 | End: 2024-08-20

## 2023-12-26 RX ORDER — ATORVASTATIN CALCIUM 40 MG/1
40 TABLET, FILM COATED ORAL DAILY
Qty: 90 TABLET | Refills: 2 | Status: SHIPPED | OUTPATIENT
Start: 2023-12-26 | End: 2024-08-20

## 2023-12-26 RX ORDER — CHLORTHALIDONE 25 MG/1
12.5 TABLET ORAL DAILY
Qty: 45 TABLET | Refills: 2 | Status: SHIPPED | OUTPATIENT
Start: 2023-12-26 | End: 2024-08-20

## 2024-01-03 ENCOUNTER — PATIENT OUTREACH (OUTPATIENT)
Dept: GASTROENTEROLOGY | Facility: CLINIC | Age: 69
End: 2024-01-03
Payer: COMMERCIAL

## 2024-01-06 ENCOUNTER — HEALTH MAINTENANCE LETTER (OUTPATIENT)
Age: 69
End: 2024-01-06

## 2024-03-16 ENCOUNTER — HEALTH MAINTENANCE LETTER (OUTPATIENT)
Age: 69
End: 2024-03-16

## 2024-04-23 DIAGNOSIS — E11.9 TYPE 2 DIABETES MELLITUS WITHOUT COMPLICATION, WITHOUT LONG-TERM CURRENT USE OF INSULIN (H): ICD-10-CM

## 2024-04-23 DIAGNOSIS — I10 BENIGN ESSENTIAL HYPERTENSION: ICD-10-CM

## 2024-04-23 RX ORDER — LISINOPRIL 20 MG/1
20 TABLET ORAL DAILY
Qty: 90 TABLET | Refills: 0 | Status: SHIPPED | OUTPATIENT
Start: 2024-04-23 | End: 2024-06-25

## 2024-05-14 ENCOUNTER — MYC REFILL (OUTPATIENT)
Dept: FAMILY MEDICINE | Facility: CLINIC | Age: 69
End: 2024-05-14
Payer: COMMERCIAL

## 2024-05-14 DIAGNOSIS — Z96.649 HISTORY OF HIP REPLACEMENT, UNSPECIFIED LATERALITY: ICD-10-CM

## 2024-05-14 DIAGNOSIS — Z79.2 NEED FOR PROPHYLACTIC ANTIBIOTIC: ICD-10-CM

## 2024-05-14 DIAGNOSIS — Z96.643 HISTORY OF BILATERAL HIP REPLACEMENTS: ICD-10-CM

## 2024-05-14 RX ORDER — AMOXICILLIN 500 MG/1
CAPSULE ORAL
Qty: 4 CAPSULE | Refills: 0 | Status: SHIPPED | OUTPATIENT
Start: 2024-05-14

## 2024-05-14 NOTE — TELEPHONE ENCOUNTER
Requested Prescriptions   Pending Prescriptions Disp Refills    amoxicillin (AMOXIL) 500 MG capsule 4 capsule 0     Sig: TAKE 4 CAPSULES (2,000 MG) BY MOUTH ONCE AS NEEDED (PRIOR TO DENTAL PROCEDURE) Strength: 500 mg       Oral Acne/Rosacea Medications Protocol Failed - 5/14/2024  9:54 AM        Failed - Confirmation of diagnosis is required     Please confirm diagnosis is acne or rosacea.     If NOT acne or rosacea; refer request to provider for further evaluation.    If diagnosis IS acne or rosacea, OK to refill BASED ON PREVIOUS REFILL CLINICAL NOTE RECOMMENDATION.          Failed - Medication indicated for associated diagnosis     The medication is prescribed for one or more of the following conditions:    Acne   Rosacea          Passed - Patient is 12 years of age or older        Passed - Medication is active on med list        Passed - Recent (12 month) or future (90 days) visit with authorizing provider s specialty     The patient must have completed an in-person or virtual visit within the past 12 months or has a future visit scheduled within the next 90 days with the authorizing provider s specialty.  Urgent care and e-visits do not quality as an office visit for this protocol.

## 2024-06-25 DIAGNOSIS — E11.9 TYPE 2 DIABETES MELLITUS WITHOUT COMPLICATION, WITHOUT LONG-TERM CURRENT USE OF INSULIN (H): ICD-10-CM

## 2024-06-25 DIAGNOSIS — I10 BENIGN ESSENTIAL HYPERTENSION: ICD-10-CM

## 2024-06-25 RX ORDER — LISINOPRIL 20 MG/1
20 TABLET ORAL DAILY
Qty: 90 TABLET | Refills: 0 | Status: SHIPPED | OUTPATIENT
Start: 2024-06-25 | End: 2024-07-03

## 2024-07-03 ENCOUNTER — TRANSFERRED RECORDS (OUTPATIENT)
Dept: HEALTH INFORMATION MANAGEMENT | Facility: CLINIC | Age: 69
End: 2024-07-03

## 2024-07-03 ENCOUNTER — OFFICE VISIT (OUTPATIENT)
Dept: FAMILY MEDICINE | Facility: CLINIC | Age: 69
End: 2024-07-03
Payer: COMMERCIAL

## 2024-07-03 VITALS
WEIGHT: 225 LBS | RESPIRATION RATE: 16 BRPM | OXYGEN SATURATION: 96 % | HEART RATE: 66 BPM | DIASTOLIC BLOOD PRESSURE: 72 MMHG | TEMPERATURE: 97.3 F | SYSTOLIC BLOOD PRESSURE: 112 MMHG | BODY MASS INDEX: 31.5 KG/M2 | HEIGHT: 71 IN

## 2024-07-03 DIAGNOSIS — Z00.00 ENCOUNTER FOR MEDICARE ANNUAL WELLNESS EXAM: Primary | ICD-10-CM

## 2024-07-03 DIAGNOSIS — E11.65 TYPE 2 DIABETES MELLITUS WITH HYPERGLYCEMIA, WITHOUT LONG-TERM CURRENT USE OF INSULIN (H): ICD-10-CM

## 2024-07-03 DIAGNOSIS — I25.10 CORONARY ARTERY CALCIFICATION SEEN ON CAT SCAN: ICD-10-CM

## 2024-07-03 DIAGNOSIS — I10 BENIGN ESSENTIAL HYPERTENSION: ICD-10-CM

## 2024-07-03 LAB
ANION GAP SERPL CALCULATED.3IONS-SCNC: 10 MMOL/L (ref 7–15)
BUN SERPL-MCNC: 19.1 MG/DL (ref 8–23)
CALCIUM SERPL-MCNC: 9.6 MG/DL (ref 8.8–10.2)
CHLORIDE SERPL-SCNC: 102 MMOL/L (ref 98–107)
CHOLEST SERPL-MCNC: 98 MG/DL
CREAT SERPL-MCNC: 0.75 MG/DL (ref 0.67–1.17)
CREAT UR-MCNC: 113.2 MG/DL
DEPRECATED HCO3 PLAS-SCNC: 26 MMOL/L (ref 22–29)
EGFRCR SERPLBLD CKD-EPI 2021: >90 ML/MIN/1.73M2
FASTING STATUS PATIENT QL REPORTED: YES
FASTING STATUS PATIENT QL REPORTED: YES
GLUCOSE SERPL-MCNC: 151 MG/DL (ref 70–99)
HBA1C MFR BLD: 9.1 % (ref 0–5.6)
HDLC SERPL-MCNC: 32 MG/DL
LDLC SERPL CALC-MCNC: 44 MG/DL
MICROALBUMIN UR-MCNC: <12 MG/L
MICROALBUMIN/CREAT UR: NORMAL MG/G{CREAT}
NONHDLC SERPL-MCNC: 66 MG/DL
POTASSIUM SERPL-SCNC: 4 MMOL/L (ref 3.4–5.3)
SODIUM SERPL-SCNC: 138 MMOL/L (ref 135–145)
TRIGL SERPL-MCNC: 108 MG/DL

## 2024-07-03 PROCEDURE — 82043 UR ALBUMIN QUANTITATIVE: CPT | Performed by: FAMILY MEDICINE

## 2024-07-03 PROCEDURE — 36415 COLL VENOUS BLD VENIPUNCTURE: CPT | Performed by: FAMILY MEDICINE

## 2024-07-03 PROCEDURE — 80048 BASIC METABOLIC PNL TOTAL CA: CPT | Performed by: FAMILY MEDICINE

## 2024-07-03 PROCEDURE — 83036 HEMOGLOBIN GLYCOSYLATED A1C: CPT | Performed by: FAMILY MEDICINE

## 2024-07-03 PROCEDURE — G0439 PPPS, SUBSEQ VISIT: HCPCS | Mod: 25 | Performed by: FAMILY MEDICINE

## 2024-07-03 PROCEDURE — 99214 OFFICE O/P EST MOD 30 MIN: CPT | Performed by: FAMILY MEDICINE

## 2024-07-03 PROCEDURE — 82570 ASSAY OF URINE CREATININE: CPT | Performed by: FAMILY MEDICINE

## 2024-07-03 PROCEDURE — 80061 LIPID PANEL: CPT | Performed by: FAMILY MEDICINE

## 2024-07-03 RX ORDER — GLIPIZIDE 5 MG/1
5 TABLET, FILM COATED, EXTENDED RELEASE ORAL DAILY
Qty: 90 TABLET | Refills: 3 | Status: SHIPPED | OUTPATIENT
Start: 2024-07-03

## 2024-07-03 RX ORDER — LISINOPRIL 20 MG/1
20 TABLET ORAL DAILY
Qty: 90 TABLET | Refills: 3 | Status: SHIPPED | OUTPATIENT
Start: 2024-07-03

## 2024-07-03 RX ORDER — GLIPIZIDE 2.5 MG/1
2.5 TABLET, EXTENDED RELEASE ORAL DAILY
Qty: 90 TABLET | Refills: 3 | Status: CANCELLED | OUTPATIENT
Start: 2024-07-03

## 2024-07-03 SDOH — HEALTH STABILITY: PHYSICAL HEALTH: ON AVERAGE, HOW MANY DAYS PER WEEK DO YOU ENGAGE IN MODERATE TO STRENUOUS EXERCISE (LIKE A BRISK WALK)?: 5 DAYS

## 2024-07-03 ASSESSMENT — PAIN SCALES - GENERAL: PAINLEVEL: MODERATE PAIN (5)

## 2024-07-03 ASSESSMENT — SOCIAL DETERMINANTS OF HEALTH (SDOH): HOW OFTEN DO YOU GET TOGETHER WITH FRIENDS OR RELATIVES?: ONCE A WEEK

## 2024-07-03 NOTE — PROGRESS NOTES
"Preventive Care Visit  [facility]  Mo Granados MD, Family Medicine  Jul 3, 2024      Assessment & Plan     Encounter for Medicare annual wellness exam  Patient was advised on recommended screening and preventive health recommendations.  He verbalized understanding.  He will obtain covid19 and RSV vaccine at the pharmacy.  He will check with Medicare coverage for shingles vaccine.    Type 2 diabetes mellitus with hyperglycemia, without long-term current use of insulin (H)  Asymptomatic. Reports occasional >200 BG.  Update labs today. Adjust med as appropriate.  Reinforced carbohydrate control.  Regular exercise 30 mins per day, 3 times a week.  Regular foot care, annual eye exam.  Flu vaccine every year.   - Albumin Random Urine Quantitative with Creat Ratio  - Basic metabolic panel  - Hemoglobin A1c  - FOOT EXAM  - OFFICE/OUTPT VISIT,EST,LEVL IV  - Hemoglobin A1c  - Basic metabolic panel  - Albumin Random Urine Quantitative with Creat Ratio    Benign essential hypertension  Discussed causes, course, complications and treatment of condition.  Low salt, low fat diet.   Exercise as tolerated 30 mins per day 3 days a week to start with.  Counseled on different meds; patient agreed to above recommended med.  Take meds as prescribed; call if with side effects.   Nurse visit in 2 weeks for recheck.  Return precautions given.   - lisinopril (ZESTRIL) 20 MG tablet  Dispense: 90 tablet; Refill: 3  - OFFICE/OUTPT VISIT,EST,LEVL IV    Coronary artery calcification seen on CAT scan  Asymptomatic  Continue aspirin and statin.  Reinforced heart healthy lifestyle.   - Lipid panel reflex to direct LDL Fasting  - Lipid panel reflex to direct LDL Fasting      Patient has been advised of split billing requirements and indicates understanding: Yes        BMI  Estimated body mass index is 31.38 kg/m  as calculated from the following:    Height as of this encounter: 1.803 m (5' 11\").    Weight as of " this encounter: 102.1 kg (225 lb).   Weight management plan: Discussed healthy diet and exercise guidelines    Counseling  Appropriate preventive services were discussed with this patient, including applicable screening as appropriate for fall prevention, nutrition, physical activity, Tobacco-use cessation, weight loss and cognition.  Checklist reviewing preventive services available has been given to the patient.  Reviewed patient's diet, addressing concerns and/or questions.   The patient was provided with written information regarding signs of hearing loss.   Information on urinary incontinence and treatment options given to patient.       Patient Instructions   Patient Education    Update covid19 vaccination soon.  Get RSV and flu shot in the fall.  Get shingles vaccine - likely covered at the pharmacy by Medicare.    You will be contacted in 1-2 days for results of your lab tests.   Further recommendations thereafter.    Be consistent with low salt, low trans fat and low saturated fat diet.  Eat food rich in omega-3-fatty acids as you tolerate. (salmon, olive oil)  Eat 5 cups of vegetables, fruits and whole grains per day.  Limit starchy food (white rice, white bread, white pasta, white potatoes) to less than a cup per meal.  Minimize sweets, junk food and fastfood. Limit soda beverages to one serving per day; best to avoid it altogether though.    Exercise: moderate intensity sustained for at least 30 mins per episode, goal of 150 mins per week at least  Combine cardiovascular and resistance exercises.  These exercise recommendations are in addition to your daily activity at work or home.  Work on losing weight.    Regular foot care; don't walk barefoot  Annual eye exam.  Please request your eye doctor to furnish a copy of the eye exam report to the clinic to be placed in your record.  Flu vaccine by the end of October yearly.  Be sure to update any other recommended vaccinations for diabetics.    Preventative  "Care Visits include: Yearly physicals, Well-child visits, Welcome to Medicare visits, & Medicare yearly wellness exams.    The purpose of these visits is to discuss your medical history and prevent health problems before you are sick.  You may need to pay a copay, coinsurance or deductible if your visit today includes testing or treating for a new or existing condition.    Additional charges may be incurred for today's visit. If you have questions about what your insurance plan covers, please contact your Insurance Company's member service department.  If you have questions specific to a bill you have already received from [facility], please contact the [facility]illing Office at [phone].    Preventive Care Advice   This is general advice we often give to help people stay healthy. Your care team may have specific advice just for you. Please talk to your care team about your own preventive care needs.  Lifestyle  Exercise at least 150 minutes each week (30 minutes a day, 5 days a week).  Do muscle strengthening activities 2 days a week. These help control your weight and prevent disease.  No smoking.  Wear sunscreen to prevent skin cancer.  Have your home tested for radon every 2 to 5 years. Radon is a colorless, odorless gas that can harm your lungs. To learn more, go to www.health.[facility].mn. and search for \"Radon in Homes.\"  Keep guns unloaded and locked up in a safe place like a safe or gun vault, or, use a gun lock and hide the keys. Always lock away bullets separately. To learn more, visit [employer].mn.gov and search for \"safe gun storage.\"  Nutrition  Eat 5 or more servings of fruits and vegetables each day.  Try wheat bread, brown rice and whole grain pasta (instead of white bread, rice, and pasta).  Get enough calcium and vitamin D. Check the label on foods and aim for 100% of the RDA (recommended daily allowance).  Regular exams  Have a dental exam and cleaning every 6 months.  See your health care team every " year to talk about:  Any changes in your health.  Any medicines your care team has prescribed.  Preventive care, family planning, and ways to prevent chronic diseases.  Shots (vaccines)   HPV shots (up to age 26), if you've never had them before.  Hepatitis B shots (up to age 59), if you've never had them before.  COVID-19 shot: Get this shot when it's due.  Flu shot: Get a flu shot every year.  Tetanus shot: Get a tetanus shot every 10 years.  Pneumococcal, hepatitis A, and RSV shots: Ask your care team if you need these based on your risk.  Shingles shot (for age 50 and up).  General health tests  Diabetes screening:  Starting at age 35, Get screened for diabetes at least every 3 years.  If you are younger than age 35, ask your care team if you should be screened for diabetes.  Cholesterol test: At age 39, start having a cholesterol test every 5 years, or more often if advised.  Bone density scan (DEXA): At age 50, ask your care team if you should have this scan for osteoporosis (brittle bones).  Hepatitis C: Get tested at least once in your life.  Abdominal aortic aneurysm screening: Talk to your doctor about having this screening if you:  Have ever smoked; and  Are biologically male; and  Are between the ages of 65 and 75.  STIs (sexually transmitted infections)  Before age 24: Ask your care team if you should be screened for STIs.  After age 24: Get screened for STIs if you're at risk. You are at risk for STIs (including HIV) if:  You are sexually active with more than one person.  You don't use condoms every time.  You or a partner was diagnosed with a sexually transmitted infection.  If you are at risk for HIV, ask about PrEP medicine to prevent HIV.  Get tested for HIV at least once in your life, whether you are at risk for HIV or not.  Cancer screening tests  Cervical cancer screening: If you have a cervix, begin getting regular cervical cancer screening tests at age 21. Most people who have regular  screenings with normal results can stop after age 65. Talk about this with your provider.  Breast cancer scan (mammogram): If you've ever had breasts, begin having regular mammograms starting at age 40. This is a scan to check for breast cancer.  Colon cancer screening: It is important to start screening for colon cancer at age 45.  Have a colonoscopy test every 10 years (or more often if you're at risk) Or, ask your provider about stool tests like a FIT test every year or Cologuard test every 3 years.  To learn more about your testing options, visit: www.[employer]/730083.pdf.  For help making a decision, visit: lynn/ll00295.  Prostate cancer screening test: If you have a prostate and are age 55 to 69, ask your provider if you would benefit from a yearly prostate cancer screening test.  Lung cancer screening: If you are a current or former smoker age 50 to 80, ask your care team if ongoing lung cancer screenings are right for you.  For informational purposes only. Not to replace the advice of your health care provider. Copyright   2023 [employer]. All rights reserved. Clinically reviewed by the [facility]. [place] 713878 - REV 04/24.  Hearing Loss: Care Instructions  Overview     Hearing loss is a sudden or slow decrease in how well you hear. It can range from slight to profound. Permanent hearing loss can occur with aging. It also can happen when you are exposed long-term to loud noise. Examples include listening to loud music, riding motorcycles, or being around other loud machines.  Hearing loss can affect your work and home life. It can make you feel lonely or depressed. You may feel that you have lost your independence. But hearing aids and other devices can help you hear better and feel connected to others.  Follow-up care is a key part of your treatment and safety. Be sure to make and go to all appointments, and call your doctor if you are having problems. It's  also a good idea to know your test results and keep a list of the medicines you take.  How can you care for yourself at home?  Avoid loud noises whenever possible. This helps keep your hearing from getting worse.  Always wear hearing protection around loud noises.  Wear a hearing aid as directed.  A professional can help you pick a hearing aid that will work best for you.  You can also get hearing aids over the counter for mild to moderate hearing loss.  Have hearing tests as your doctor suggests. They can show whether your hearing has changed. Your hearing aid may need to be adjusted.  Use other devices as needed. These may include:  Telephone amplifiers and hearing aids that can connect to a television, stereo, radio, or microphone.  Devices that use lights or vibrations. These alert you to the doorbell, a ringing telephone, or a baby monitor.  Television closed-captioning. This shows the words at the bottom of the screen. Most new TVs can do this.  TTY (text telephone). This lets you type messages back and forth on the telephone instead of talking or listening. These devices are also called TDD. When messages are typed on the keyboard, they are sent over the phone line to a receiving TTY. The message is shown on a monitor.  Use text messaging, social media, and email if it is hard for you to communicate by telephone.  Try to learn a listening technique called speechreading. It is not lipreading. You pay attention to people's gestures, expressions, posture, and tone of voice. These clues can help you understand what a person is saying. Face the person you are talking to, and have them face you. Make sure the lighting is good. You need to see the other person's face clearly.  Think about counseling if you need help to adjust to your hearing loss.  When should you call for help?  Watch closely for changes in your health, and be sure to contact your doctor if:    You think your hearing is getting worse.     You have  "new symptoms, such as dizziness or nausea.   Where can you learn more?  Go to https://www.[employer].net/patiented  Enter R798 in the search box to learn more about \"Hearing Loss: Care Instructions.\"  Current as of: September 27, 2023               Content Version: 14.0    7696-9970 [employer].   Care instructions adapted under license by your healthcare professional. If you have questions about a medical condition or this instruction, always ask your healthcare professional. [employer] disclaims any warranty or liability for your use of this information.      Bladder Training: Care Instructions  Your Care Instructions     Bladder training is used to treat urge incontinence and stress incontinence. Urge incontinence means that the need to urinate comes on so fast that you can't get to a toilet in time. Stress incontinence means that you leak urine because of pressure on your bladder. For example, it may happen when you laugh, cough, or lift something heavy.  Bladder training can increase how long you can wait before you have to urinate. It can also help your bladder hold more urine. And it can give you better control over the urge to urinate.  It is important to remember that bladder training takes a few weeks to a few months to make a difference. You may not see results right away, but don't give up.  Follow-up care is a key part of your treatment and safety. Be sure to make and go to all appointments, and call your doctor if you are having problems. It's also a good idea to know your test results and keep a list of the medicines you take.  How can you care for yourself at home?  Work with your doctor to come up with a bladder training program that is right for you. You may use one or more of the following methods.  Delayed urination  In the beginning, try to keep from urinating for 5 minutes after you first feel the need to go.  While you wait, take deep, slow breaths to relax. Marco A " "exercises can also help you delay the need to go to the bathroom.  After some practice, when you can easily wait 5 minutes to urinate, try to wait 10 minutes before you urinate.  Slowly increase the waiting period until you are able to control when you have to urinate.  Scheduled urination  Empty your bladder when you first wake up in the morning.  Schedule times throughout the day when you will urinate.  Start by going to the bathroom every hour, even if you don't need to go.  Slowly increase the time between trips to the bathroom.  When you have found a schedule that works well for you, keep doing it.  If you wake up during the night and have to urinate, do it. Apply your schedule to waking hours only.  Kegel exercises  These tighten and strengthen pelvic muscles, which can help you control the flow of urine. (If doing these exercises causes pain, stop doing them and talk with your doctor.) To do Kegel exercises:  Squeeze your muscles as if you were trying not to pass gas. Or squeeze your muscles as if you were stopping the flow of urine. Your belly, legs, and buttocks shouldn't move.  Hold the squeeze for 3 seconds, then relax for 5 to 10 seconds.  Start with 3 seconds, then add 1 second each week until you are able to squeeze for 10 seconds.  Repeat the exercise 10 times a session. Do 3 to 8 sessions a day.  When should you call for help?  Watch closely for changes in your health, and be sure to contact your doctor if:    Your incontinence is getting worse.     You do not get better as expected.   Where can you learn more?  Go to https://www.[employer].net/patiented  Enter V684 in the search box to learn more about \"Bladder Training: Care Instructions.\"  Current as of: November 15, 2023               Content Version: 14.0    8508-4544 [employer].   Care instructions adapted under license by your healthcare professional. If you have questions about a medical condition or this instruction, always ask " your healthcare professional. [employer]wise, [employer] disclaims any warranty or liability for your use of this information.           Jareth KRUGER is a 68 year old, presenting for the following:  Physical        7/3/2024     6:50 AM   Additional Questions   Roomed by Paty FATIMA   Accompanied by self         Health Care Directive  Patient does not have a Health Care Directive or Living Will: Discussed advance care planning with patient; information given to patient to review.    HPI      Diabetes Follow-up    How often are you checking your blood sugar? A few times a month  What time of day are you checking your blood sugars (select all that apply)?  Before meals  Have you had any blood sugars above 200?  Yes a couple  Have you had any blood sugars below 70?  No  What symptoms do you notice when your blood sugar is low?  None  What concerns do you have today about your diabetes? None   Do you have any of these symptoms? (Select all that apply)  No numbness or tingling in feet.  No redness, sores or blisters on feet.  No complaints of excessive thirst.  No reports of blurry vision.  No significant changes to weight.          Hyperlipidemia Follow-Up    Are you regularly taking any medication or supplement to lower your cholesterol?   Yes- atorvastatin  Are you having muscle aches or other side effects that you think could be caused by your cholesterol lowering medication?  No    Hypertension Follow-up    Do you check your blood pressure regularly outside of the clinic? No   Are you following a low salt diet? Yes, tries to  Are your blood pressures ever more than 140 on the top number (systolic) OR more   than 90 on the bottom number (diastolic), for example 140/90?     BP Readings from Last 2 Encounters:   07/03/24 112/72   07/27/23 108/72     Hemoglobin A1C (%)   Date Value   07/07/2023 7.5 (H)   10/26/2022 7.1 (H)   07/01/2021 6.1 (H)   11/10/2020 6.3 (H)     LDL Cholesterol Calculated (mg/dL)   Date Value    07/07/2023 55   04/04/2022 29   07/01/2021 47   10/22/2020 47           7/3/2024   General Health   How would you rate your overall physical health? Excellent   Feel stress (tense, anxious, or unable to sleep) To some extent      (!) STRESS CONCERN      7/3/2024   Nutrition   Diet: Low salt    Low fat/cholesterol    Diabetic       Multiple values from one day are sorted in reverse-chronological order         7/3/2024   Exercise   Days per week of moderate/strenous exercise 5 days            7/3/2024   Social Factors   Frequency of gathering with friends or relatives Once a week   Worry food won't last until get money to buy more No   Food not last or not have enough money for food? No   Do you have housing? (Housing is defined as stable permanent housing and does not include staying ouside in a car, in a tent, in an abandoned building, in an overnight shelter, or couch-surfing.) Yes   Are you worried about losing your housing? No   Lack of transportation? No   Unable to get utilities (heat,electricity)? No            7/3/2024   Fall Risk   Fallen 2 or more times in the past year? No    No   Trouble with walking or balance? No    No       Multiple values from one day are sorted in reverse-chronological order          7/3/2024   Activities of Daily Living- Home Safety   Needs help with the following daily activites None of the above   Safety concerns in the home None of the above            7/3/2024   Dental   Dentist two times every year? Yes            7/3/2024   Hearing Screening   Hearing concerns? (!) I NEED TO ASK PEOPLE TO SPEAK UP OR REPEAT THEMSELVES.    (!) IT'S HARDER TO UNDERSTAND WOMEN'S VOICES THAN MEN'S VOICES.    (!) IT'S HARD TO FOLLOW A CONVERSATION IN A NOISY RESTAURANT OR CROWDED ROOM.       Multiple values from one day are sorted in reverse-chronological order         7/3/2024   Driving Risk Screening   Patient/family members have concerns about driving No            7/3/2024   General  Alertness/Fatigue Screening   Have you been more tired than usual lately? No            7/3/2024   Urinary Incontinence Screening   Bothered by leaking urine in past 6 months Yes            7/3/2024   TB Screening   Were you born outside of the [place]? No            Today's PHQ-2 Score:       7/3/2024     6:43 AM   PHQ-2 (  [employer])   Q1: Little interest or pleasure in doing things 0   Q2: Feeling down, depressed or hopeless 0   PHQ-2 Score 0   Q1: Little interest or pleasure in doing things Not at all   Q2: Feeling down, depressed or hopeless Not at all   PHQ-2 Score 0           7/3/2024   Substance Use   Alcohol more than 3/day or more than 7/wk Not Applicable   Do you have a current opioid prescription? No   How severe/bad is pain from 1 to 10? 5/10   Do you use any other substances recreationally? No        Social History     Tobacco Use    Smoking status: Former     Current packs/day: 0.00     Average packs/day: 1.5 packs/day for 20.0 years (30.0 ttl pk-yrs)     Types: Cigarettes     Start date: 10/1/1972     Quit date: 10/1/1992     Years since quittin.7    Smokeless tobacco: Never   Vaping Use    Vaping status: Never Used   Substance Use Topics    Alcohol use: Not Currently    Drug use: Never       ASCVD Risk   The ASCVD Risk score (Joselyn TOBIAS, et al., 2019) failed to calculate for the following reasons:    The valid total cholesterol range is 130 to 320 mg/dL            Reviewed and updated as needed this visit by Provider     Meds                Past Medical History:   Diagnosis Date    Diabetes (H)     Osteoarthrosis, unspecified whether generalized or localized, pelvic region and thigh     Unspecified essential hypertension      Past Surgical History:   Procedure Laterality Date    JOINT REPLACEMENT      left and right total hip replacement     ORTHOPEDIC SURGERY  2012    both total hip    TOTAL HIP ARTHROPLASTY Left 2012    TOTAL HIP ARTHROPLASTY Right      Patient Active Problem List    Diagnosis    Benign neoplasm of colon    Coronary artery calcification seen on CAT scan    Genital herpes    Hyperlipemia    Hypertension    Right thyroid nodule    Lightheadedness     Past Surgical History:   Procedure Laterality Date    JOINT REPLACEMENT      left and right total hip replacement     ORTHOPEDIC SURGERY  2012    both total hip    TOTAL HIP ARTHROPLASTY Left 2012    TOTAL HIP ARTHROPLASTY Right        Social History     Tobacco Use    Smoking status: Former     Current packs/day: 0.00     Average packs/day: 1.5 packs/day for 20.0 years (30.0 ttl pk-yrs)     Types: Cigarettes     Start date: 10/1/1972     Quit date: 10/1/1992     Years since quittin.7    Smokeless tobacco: Never   Substance Use Topics    Alcohol use: Not Currently     Family History   Problem Relation Age of Onset    Diabetes Paternal Aunt     Arthritis Maternal Aunt         Rheumatoid    Hypertension Mother     Heart Disease Mother          Current Outpatient Medications   Medication Sig Dispense Refill    acyclovir (ZOVIRAX) 400 MG tablet TAKE 1 TABLET BY MOUTH TWICE  DAILY 180 tablet 2    amoxicillin (AMOXIL) 500 MG capsule TAKE 4 CAPSULES (2,000 MG) BY MOUTH ONCE AS NEEDED (PRIOR TO DENTAL PROCEDURE) Strength: 500 mg 4 capsule 0    aspirin (ASA) 81 MG EC tablet Take 81 mg by mouth      atorvastatin (LIPITOR) 40 MG tablet TAKE 1 TABLET BY MOUTH DAILY 90 tablet 2    blood glucose (NO BRAND SPECIFIED) test strip Use to test blood sugar 2 times daily or as directed. 100 strip 11    blood glucose calibration (NO BRAND SPECIFIED) solution Use to calibrate blood glucose monitor as needed as directed. 1 each 11    Calcium Carbonate (CALCIUM 600 PO) Take by mouth daily      chlorthalidone (HYGROTON) 25 MG tablet TAKE ONE-HALF TABLET BY MOUTH  DAILY 45 tablet 2    cholecalciferol (VITAMIN D3) 25 mcg (1000 units) capsule Take 1,000 Units by mouth      Cyanocobalamin (VITAMIN B12 PO)       glipiZIDE (GLUCOTROL XL) 2.5 MG 24 hr  tablet Take 1 tablet (2.5 mg) by mouth daily Hold until patient calls for refill. 90 tablet 3    lisinopril (ZESTRIL) 20 MG tablet TAKE 1 TABLET BY MOUTH DAILY 90 tablet 0    sildenafil (VIAGRA) 100 MG tablet Take 1 tablet (100 mg) by mouth daily as needed (sex) 30 tablet 11    fluticasone (FLONASE) 50 MCG/ACT nasal spray Spray 1 spray into both nostrils daily (Patient not taking: Reported on 7/3/2024)       Allergies   Allergen Reactions    Metformin Itching     Perianal itch and some erythema and skin itch on arms etc.      Current providers sharing in care for this patient include:  Patient Care Team:  Mo Granados MD as PCP - General (Family Medicine)  Mo Granados MD as Assigned PCP  Jules Garcia MD as MD (Urology)    The following health maintenance items are reviewed in Epic and correct as of today:  Health Maintenance   Topic Date Due    RSV VACCINE (Pregnancy & 60+) (1 - 1-dose 60+ series) Never done    ZOSTER IMMUNIZATION (2 of 2) 03/18/2016    DIABETIC FOOT EXAM  12/05/2023    ANNUAL REVIEW OF HM ORDERS  12/05/2023    A1C  01/07/2024    COVID-19 Vaccine (8 - 2023-24 season) 03/02/2024    BMP  07/07/2024    LIPID  07/07/2024    MICROALBUMIN  07/07/2024    INFLUENZA VACCINE (1) 09/01/2024    MEDICARE ANNUAL WELLNESS VISIT  07/03/2025    FALL RISK ASSESSMENT  07/03/2025    DTAP/TDAP/TD IMMUNIZATION (4 - Td or Tdap) 12/30/2025    ADVANCE CARE PLANNING  12/05/2027    COLORECTAL CANCER SCREENING  09/19/2028    HEPATITIS C SCREENING  Completed    PHQ-2 (once per calendar year)  Completed    Pneumococcal Vaccine: 65+ Years  Completed    AORTIC ANEURYSM SCREENING (SYSTEM ASSIGNED)  Completed    IPV IMMUNIZATION  Aged Out    HPV IMMUNIZATION  Aged Out    MENINGITIS IMMUNIZATION  Aged Out    RSV MONOCLONAL ANTIBODY  Aged Out    EYE EXAM  Discontinued         Review of Systems  Constitutional, HEENT, cardiovascular, pulmonary, GI, , musculoskeletal, neuro, skin, endocrine and  "psych systems are negative, except as otherwise noted.     Objective    Exam  /72 (BP Location: Right arm, Patient Position: Chair, Cuff Size: Adult Large)   Pulse 66   Temp 97.3  F (36.3  C) (Tympanic)   Resp 16   Ht 1.803 m (5' 11\")   Wt 102.1 kg (225 lb)   SpO2 96%   BMI 31.38 kg/m     Estimated body mass index is 31.38 kg/m  as calculated from the following:    Height as of this encounter: 1.803 m (5' 11\").    Weight as of this encounter: 102.1 kg (225 lb).    Physical Exam  GENERAL APPEARANCE: alert and no distress  EYES: pink conj, no icterus, PERRL, EOMI  HENT: ear canals and TM's normal, nose and mouth without ulcers or lesions, oropharynx clear and oral mucous membranes moist  NECK: no adenopathy, no asymmetry, masses, or scars and thyroid normal to palpation  RESP: lungs clear to auscultation - no rales, rhonchi or wheezes  CV: regular rates and rhythm, normal S1 S2, no S3 or S4, no murmur, click or rub, no peripheral edema and peripheral pulses strong  ABDOMEN: soft, nontender, no hepatosplenomegaly, no masses and bowel sounds normal  RECTAL: deferred  MS: no musculoskeletal defects are noted and gait is age appropriate without ataxia  SKIN: no suspicious lesions or rashes  NEURO: Normal strength and tone, sensory exam grossly normal, mentation intact and speech normal   FOOT EXAM: no ulcer/erythema; pedal pulses strong bilaterally; monofilament: no deficit either foot          7/3/2024   Mini Cog   Clock Draw Score 2 Normal   3 Item Recall 3 objects recalled   Mini Cog Total Score 5                 Signed Electronically by: Mo Granados MD    "

## 2024-07-03 NOTE — PATIENT INSTRUCTIONS
Patient Education     Update covid19 vaccination soon.  Get RSV and flu shot in the fall.  Get shingles vaccine - likely covered at the pharmacy by Medicare.    You will be contacted in 1-2 days for results of your lab tests.   Further recommendations thereafter.    Be consistent with low salt, low trans fat and low saturated fat diet.  Eat food rich in omega-3-fatty acids as you tolerate. (salmon, olive oil)  Eat 5 cups of vegetables, fruits and whole grains per day.  Limit starchy food (white rice, white bread, white pasta, white potatoes) to less than a cup per meal.  Minimize sweets, junk food and fastfood. Limit soda beverages to one serving per day; best to avoid it altogether though.    Exercise: moderate intensity sustained for at least 30 mins per episode, goal of 150 mins per week at least  Combine cardiovascular and resistance exercises.  These exercise recommendations are in addition to your daily activity at work or home.  Work on losing weight.    Regular foot care; don't walk barefoot  Annual eye exam.  Please request your eye doctor to furnish a copy of the eye exam report to the clinic to be placed in your record.  Flu vaccine by the end of October yearly.  Be sure to update any other recommended vaccinations for diabetics.    Preventative Care Visits include: Yearly physicals, Well-child visits, Welcome to Medicare visits, & Medicare yearly wellness exams.    The purpose of these visits is to discuss your medical history and prevent health problems before you are sick.  You may need to pay a copay, coinsurance or deductible if your visit today includes testing or treating for a new or existing condition.    Additional charges may be incurred for today's visit. If you have questions about what your insurance plan covers, please contact your Insurance Company's member service department.  If you have questions specific to a bill you have already received from [facility], please contact the Corporate  "Billing Office at [phone].    Preventive Care Advice   This is general advice we often give to help people stay healthy. Your care team may have specific advice just for you. Please talk to your care team about your own preventive care needs.  Lifestyle  Exercise at least 150 minutes each week (30 minutes a day, 5 days a week).  Do muscle strengthening activities 2 days a week. These help control your weight and prevent disease.  No smoking.  Wear sunscreen to prevent skin cancer.  Have your home tested for radon every 2 to 5 years. Radon is a colorless, odorless gas that can harm your lungs. To learn more, go to www.health.[email or web address].mn.us and search for \"Radon in Homes.\"  Keep guns unloaded and locked up in a safe place like a safe or gun vault, or, use a gun lock and hide the keys. Always lock away bullets separately. To learn more, visit 2sms.[place].gov and search for \"safe gun storage.\"  Nutrition  Eat 5 or more servings of fruits and vegetables each day.  Try wheat bread, brown rice and whole grain pasta (instead of white bread, rice, and pasta).  Get enough calcium and vitamin D. Check the label on foods and aim for 100% of the RDA (recommended daily allowance).  Regular exams  Have a dental exam and cleaning every 6 months.  See your health care team every year to talk about:  Any changes in your health.  Any medicines your care team has prescribed.  Preventive care, family planning, and ways to prevent chronic diseases.  Shots (vaccines)   HPV shots (up to age 26), if you've never had them before.  Hepatitis B shots (up to age 59), if you've never had them before.  COVID-19 shot: Get this shot when it's due.  Flu shot: Get a flu shot every year.  Tetanus shot: Get a tetanus shot every 10 years.  Pneumococcal, hepatitis A, and RSV shots: Ask your care team if you need these based on your risk.  Shingles shot (for age 50 and up).  General health tests  Diabetes screening:  Starting at age 35, Get screened for " diabetes at least every 3 years.  If you are younger than age 35, ask your care team if you should be screened for diabetes.  Cholesterol test: At age 39, start having a cholesterol test every 5 years, or more often if advised.  Bone density scan (DEXA): At age 50, ask your care team if you should have this scan for osteoporosis (brittle bones).  Hepatitis C: Get tested at least once in your life.  Abdominal aortic aneurysm screening: Talk to your doctor about having this screening if you:  Have ever smoked; and  Are biologically male; and  Are between the ages of 65 and 75.  STIs (sexually transmitted infections)  Before age 24: Ask your care team if you should be screened for STIs.  After age 24: Get screened for STIs if you're at risk. You are at risk for STIs (including HIV) if:  You are sexually active with more than one person.  You don't use condoms every time.  You or a partner was diagnosed with a sexually transmitted infection.  If you are at risk for HIV, ask about PrEP medicine to prevent HIV.  Get tested for HIV at least once in your life, whether you are at risk for HIV or not.  Cancer screening tests  Cervical cancer screening: If you have a cervix, begin getting regular cervical cancer screening tests at age 21. Most people who have regular screenings with normal results can stop after age 65. Talk about this with your provider.  Breast cancer scan (mammogram): If you've ever had breasts, begin having regular mammograms starting at age 40. This is a scan to check for breast cancer.  Colon cancer screening: It is important to start screening for colon cancer at age 45.  Have a colonoscopy test every 10 years (or more often if you're at risk) Or, ask your provider about stool tests like a FIT test every year or Cologuard test every 3 years.  To learn more about your testing options, visit: www.[employer]/754063.pdf.  For help making a decision, visit: lynn/tp44389.  Prostate cancer screening test:  If you have a prostate and are age 55 to 69, ask your provider if you would benefit from a yearly prostate cancer screening test.  Lung cancer screening: If you are a current or former smoker age 50 to 80, ask your care team if ongoing lung cancer screenings are right for you.  For informational purposes only. Not to replace the advice of your health care provider. Copyright   2023 [employer] [facility]. All rights reserved. Clinically reviewed by the [employer] [place] [facility]. [employer] 503740 - REV 04/24.  Hearing Loss: Care Instructions  Overview     Hearing loss is a sudden or slow decrease in how well you hear. It can range from slight to profound. Permanent hearing loss can occur with aging. It also can happen when you are exposed long-term to loud noise. Examples include listening to loud music, riding motorcycles, or being around other loud machines.  Hearing loss can affect your work and home life. It can make you feel lonely or depressed. You may feel that you have lost your independence. But hearing aids and other devices can help you hear better and feel connected to others.  Follow-up care is a key part of your treatment and safety. Be sure to make and go to all appointments, and call your doctor if you are having problems. It's also a good idea to know your test results and keep a list of the medicines you take.  How can you care for yourself at home?  Avoid loud noises whenever possible. This helps keep your hearing from getting worse.  Always wear hearing protection around loud noises.  Wear a hearing aid as directed.  A professional can help you pick a hearing aid that will work best for you.  You can also get hearing aids over the counter for mild to moderate hearing loss.  Have hearing tests as your doctor suggests. They can show whether your hearing has changed. Your hearing aid may need to be adjusted.  Use other devices as needed. These may include:  Telephone amplifiers and  "hearing aids that can connect to a television, stereo, radio, or microphone.  Devices that use lights or vibrations. These alert you to the doorbell, a ringing telephone, or a baby monitor.  Television closed-captioning. This shows the words at the bottom of the screen. Most new TVs can do this.  TTY (text telephone). This lets you type messages back and forth on the telephone instead of talking or listening. These devices are also called TDD. When messages are typed on the keyboard, they are sent over the phone line to a receiving TTY. The message is shown on a monitor.  Use text messaging, social media, and email if it is hard for you to communicate by telephone.  Try to learn a listening technique called speechreading. It is not lipreading. You pay attention to people's gestures, expressions, posture, and tone of voice. These clues can help you understand what a person is saying. Face the person you are talking to, and have them face you. Make sure the lighting is good. You need to see the other person's face clearly.  Think about counseling if you need help to adjust to your hearing loss.  When should you call for help?  Watch closely for changes in your health, and be sure to contact your doctor if:    You think your hearing is getting worse.     You have new symptoms, such as dizziness or nausea.   Where can you learn more?  Go to https://www.[employer].net/patiented  Enter R798 in the search box to learn more about \"Hearing Loss: Care Instructions.\"  Current as of: September 27, 2023               Content Version: 14.0    5114-9049 [employer].   Care instructions adapted under license by your healthcare professional. If you have questions about a medical condition or this instruction, always ask your healthcare professional. [employer] disclaims any warranty or liability for your use of this information.      Bladder Training: Care Instructions  Your Care Instructions     Bladder " training is used to treat urge incontinence and stress incontinence. Urge incontinence means that the need to urinate comes on so fast that you can't get to a toilet in time. Stress incontinence means that you leak urine because of pressure on your bladder. For example, it may happen when you laugh, cough, or lift something heavy.  Bladder training can increase how long you can wait before you have to urinate. It can also help your bladder hold more urine. And it can give you better control over the urge to urinate.  It is important to remember that bladder training takes a few weeks to a few months to make a difference. You may not see results right away, but don't give up.  Follow-up care is a key part of your treatment and safety. Be sure to make and go to all appointments, and call your doctor if you are having problems. It's also a good idea to know your test results and keep a list of the medicines you take.  How can you care for yourself at home?  Work with your doctor to come up with a bladder training program that is right for you. You may use one or more of the following methods.  Delayed urination  In the beginning, try to keep from urinating for 5 minutes after you first feel the need to go.  While you wait, take deep, slow breaths to relax. Kegel exercises can also help you delay the need to go to the bathroom.  After some practice, when you can easily wait 5 minutes to urinate, try to wait 10 minutes before you urinate.  Slowly increase the waiting period until you are able to control when you have to urinate.  Scheduled urination  Empty your bladder when you first wake up in the morning.  Schedule times throughout the day when you will urinate.  Start by going to the bathroom every hour, even if you don't need to go.  Slowly increase the time between trips to the bathroom.  When you have found a schedule that works well for you, keep doing it.  If you wake up during the night and have to urinate, do  "it. Apply your schedule to waking hours only.  Kegel exercises  These tighten and strengthen pelvic muscles, which can help you control the flow of urine. (If doing these exercises causes pain, stop doing them and talk with your doctor.) To do Kegel exercises:  Squeeze your muscles as if you were trying not to pass gas. Or squeeze your muscles as if you were stopping the flow of urine. Your belly, legs, and buttocks shouldn't move.  Hold the squeeze for 3 seconds, then relax for 5 to 10 seconds.  Start with 3 seconds, then add 1 second each week until you are able to squeeze for 10 seconds.  Repeat the exercise 10 times a session. Do 3 to 8 sessions a day.  When should you call for help?  Watch closely for changes in your health, and be sure to contact your doctor if:    Your incontinence is getting worse.     You do not get better as expected.   Where can you learn more?  Go to https://www.[employer].net/patiented  Enter V684 in the search box to learn more about \"Bladder Training: Care Instructions.\"  Current as of: November 15, 2023               Content Version: 14.0    3725-6071 [employer].   Care instructions adapted under license by your healthcare professional. If you have questions about a medical condition or this instruction, always ask your healthcare professional. [employer] disclaims any warranty or liability for your use of this information.         "

## 2024-07-11 ENCOUNTER — ALLIED HEALTH/NURSE VISIT (OUTPATIENT)
Dept: EDUCATION SERVICES | Facility: CLINIC | Age: 69
End: 2024-07-11
Attending: FAMILY MEDICINE
Payer: COMMERCIAL

## 2024-07-11 DIAGNOSIS — E11.65 TYPE 2 DIABETES MELLITUS WITH HYPERGLYCEMIA, WITHOUT LONG-TERM CURRENT USE OF INSULIN (H): ICD-10-CM

## 2024-07-11 PROCEDURE — G0108 DIAB MANAGE TRN  PER INDIV: HCPCS | Performed by: DIETITIAN, REGISTERED

## 2024-07-11 NOTE — LETTER
7/11/2024         RE: Jules Cheng  [address] [facility] [id number]        Dear Colleague,    Thank you for referring your patient, Jules Cheng, to the [facility]. Please see a copy of my visit note below.    Diabetes Self-Management Education & Support    Presents for: Individual review    Type of Service: In Person Visit      ASSESSMENT:  -type 2 diabetes for ~8 years  -recent a1c of 9.1  -currently being managed with Glipizide (did not tolerate Metformin)  -on aspirin and statin therapy  -diabetes complications:  n/a  -last seen by diabetes education 2016  -home BG monitoring reveals:  66% in target, n=3, BG range: 113-223    Primary concern: wonders if the Glipizide is working. History of weight watchers.    Discussed:  basic physiology, natural progression of type 2, target BG and a1c, mechanism of action of medication (Metformin, Glipizide, Glp-1 and Gip), basic carbohydrate counting, meal plan, exercise, 5-10% (11-22#) weight loss in 6 months (Jan 2025), potential complications of uncontrolled diabetes, care team, resources.  Patient expressed understanding.      Patient's most recent   Lab Results   Component Value Date    A1C 9.1 07/03/2024    A1C 6.1 07/01/2021     is not meeting goal of <8.0    Diabetes knowledge and skills assessment:   Patient is knowledgeable in diabetes management concepts related to: Taking Medication    Continue education with the following diabetes management concepts: Healthy Eating, Being Active, Monitoring, Taking Medication, Problem Solving, Reducing Risks, and Healthy Coping    Based on learning assessment above, most appropriate setting for further diabetes education would be: Individual setting.      PLAN     Increase exercise by walking your dog consistently twice around the loop and walking more when golfing.  Begin to consider what you will do in the winter. (Consider joining the Y.)  Continue to work on healthy  "eating 3 times/day.  Do not skip meals (add lunch).  Aim for 45-75 grams of carbohydrate/meal and 0-30 grams of carbohydrate/snack.    Consider recording food intake.  Consider an jj.    Increase BG testing to 3 times/day:  fasting, before and 2 hrs after the start of the meal.  Continue to take Glipizide as prescribed.  Weight loss goal of 11-22# by Jan 2025.  Follow up with Mo Granados  Follow up with Diabetes Education 8/28/24 at 8am.  If you have questions you can send them through [employer] or call Diabetes Education Triage [phone].  For Scheduling call [phone].    See Care Plan for co-developed, patient-state behavior change goals.  AVS provided for patient today.    Education Materials Provided:  BG Log Sheet and Goals for Diabetes Care      SUBJECTIVE/OBJECTIVE:  Presents for: Individual review  Accompanied by: Self  Diabetes education in the past 24mo: No  Diabetes type: Type 2  Date of diagnosis: 2016  Disease course: Worsening  How confident are you filling out medical forms by yourself:: Extremely  Diabetes management related comments/concerns: No  Transportation concerns: No  Other concerns:: Glasses  Cultural Influences/Ethnic Background:  Not  or     Diabetes Symptoms & Complications:  Diabetes Related Symptoms: None  Weight trend: Stable  Symptom course: Stable  Disease course: Worsening       Patient Problem List and Family Medical History reviewed for relevant medical history, current medical status, and diabetes risk factors.    Vitals:  There were no vitals taken for this visit.  Estimated body mass index is 31.38 kg/m  as calculated from the following:    Height as of 7/3/24: 1.803 m (5' 11\").    Weight as of 7/3/24: 102.1 kg (225 lb).   Last 3 BP:   BP Readings from Last 3 Encounters:   07/03/24 112/72   07/27/23 108/72   12/05/22 134/82       History   Smoking Status     Former     Packs/day: 1.50     Years: 10.00     Types: Cigarettes     Start date: " "10/1/1972     Quit date: 10/1/1992   Smokeless Tobacco     Never       Labs:  Lab Results   Component Value Date    A1C 9.1 07/03/2024    A1C 6.1 07/01/2021     Lab Results   Component Value Date     07/03/2024     04/04/2022    GLC 96 07/01/2021     Lab Results   Component Value Date    LDL 44 07/03/2024    LDL 47 07/01/2021     HDL Cholesterol   Date Value Ref Range Status   07/01/2021 36 (L) >39 mg/dL Final     Direct Measure HDL   Date Value Ref Range Status   07/03/2024 32 (L) >=40 mg/dL Final   ]  GFR Estimate   Date Value Ref Range Status   07/03/2024 >90 >60 mL/min/1.73m2 Final     Comment:     eGFR calculated using 2021 CKD-EPI equation.   07/01/2021 65 >60 mL/min/[1.73_m2] Final     Comment:     Non  GFR Calc  Starting 12/18/2018, serum creatinine based estimated GFR (eGFR) will be   calculated using the Chronic Kidney Disease Epidemiology Collaboration   (CKD-EPI) equation.       GFR Estimate If Black   Date Value Ref Range Status   07/01/2021 75 >60 mL/min/[1.73_m2] Final     Comment:      GFR Calc  Starting 12/18/2018, serum creatinine based estimated GFR (eGFR) will be   calculated using the Chronic Kidney Disease Epidemiology Collaboration   (CKD-EPI) equation.       Lab Results   Component Value Date    CR 0.75 07/03/2024    CR 1.15 07/01/2021     No results found for: \"MICROALBUMIN\"    Healthy Eating:  Healthy Eating Assessed Today: Yes  Cultural/Confucianism diet restrictions?: No  Do you have any food allergies or intolerances?: No  How many times a week on average do you eat food made away from home (restaurant/take-out)?: 2  Meals include: Dinner, Breakfast, Evening Snack  Breakfast: 6:30-7am:  5oz OJ, protein drink (low carb), banana, coffee- w/ flavored creamer or sf creamer (~3- 4 carb choices)  Lunch: often skips (2 x/week will have a sandwich), drinks coffee throughout the day  Dinner: 5pm: [employer] 3 sl of large pizza, diet pop  Snacks: HS: " "pie  Beverages: Water, Coffee, Juice, Diet soda, Other (see Comments)  Please elaborate:: Protein drink every morning    Being Active:  Being Active Assessed Today: Yes  Exercise:: Yes (does golf (sometimes walks))  Days per week of moderate to strenuous exercise (like a brisk walk): 7  On average, minutes per day of exercise at this level: 40  How intense was your typical exercise? : Light (like stretching or slow walking)  Exercise Minutes per Week: 280  Barrier to exercise: None    Monitoring:  Monitoring Assessed Today: Yes  Did patient bring glucose meter to appointment? : No  Blood Glucose Meter: Accu-chek  Times checking blood sugar at home (number): 2 (had gone a long time without checking)  Times checking blood sugar at home (per): Day  Blood glucose trend:  (FBG today 123, yesterday 223, during the day yesterday 113)    Taking Medications:  Diabetes Medication(s)       Sulfonylureas       glipiZIDE (GLUCOTROL XL) 5 MG 24 hr tablet Take 1 tablet (5 mg) by mouth daily Hold until patient calls for refill.            Taking Medication Assessed Today: Yes  Current Treatments: Oral Medication (taken by mouth) (Glipizide just increased recently)  Problems taking diabetes medications regularly?: No  Diabetes medication side effects?: Yes (increased shakiness, but \"I have had a shake forever\")    Problem Solving:                 Reducing Risks:  Reducing Risks Assessed Today: Yes  Diabetes Risks: Age over 45 years  CAD Risks: Diabetes Mellitus, Male sex  Has dilated eye exam at least once a year?: Yes  Sees dentist every 6 months?: Yes  Feet checked by healthcare provider in the last year?: Yes    Healthy Coping:  Healthy Coping Assessed Today: Yes  Emotional response to diabetes: Acceptance  Informal Support system:: Children, Family, Parent, Spouse  Stage of change: ACTION (Actively working towards change)  Patient Activation Measure Survey Score:       No data to display                  Care Plan and Education " Provided:  Healthy Eating: Carbohydrate Counting and Weight Management, Being Active: Finding a physical activity routine that works for you, Monitoring: Frequency of monitoring, Individual glucose targets, and Log and interpret results, Taking Medication: Action of prescribed medication(s), Problem Solving: High glucose - causes, signs/symptoms, treatment and prevention, and Reducing Risks: Goal for A1c, how it relates to glucose and how often to check and Preventing cardiovascular disease, including blood pressure goals, lipid goals, recommendations for cardioprotective medications, statins, and aspirin    Blessing Ortega RD, [facility], 11:06 AM, 7/11/2024    Time Spent: 60 minutes  Encounter Type: Individual    Any diabetes medication dose changes were made via the CDE Protocol per the patient's referring provider. A copy of this encounter was shared with the provider.

## 2024-07-11 NOTE — PATIENT INSTRUCTIONS
PLAN     Increase exercise by walking your dog consistently twice around the loop and walking more when golfing.  Begin to consider what you will do in the winter. (Consider joining the Y.)  Continue to work on healthy eating 3 times/day.  Do not skip meals (add lunch).  Aim for 45-75 grams of carbohydrate/meal and 0-30 grams of carbohydrate/snack.    Consider recording food intake.  Consider an jj.    Increase BG testing to 3 times/day:  fasting, before and 2 hrs after the start of the meal.  Continue to take Glipizide as prescribed.  Weight loss goal of 11-22# by Jan 2025.  Follow up with Mo Granados  Follow up with Diabetes Education 8/28/24 at 8am.  If you have questions you can send them through [employer] or call Diabetes Education Triage [phone].  For Scheduling call [phone].  Blessing Ortega RD, [facility], 10:57 AM, 7/11/2024

## 2024-07-11 NOTE — PROGRESS NOTES
Diabetes Self-Management Education & Support    Presents for: Individual review    Type of Service: In Person Visit      ASSESSMENT:  -type 2 diabetes for ~8 years  -recent a1c of 9.1  -currently being managed with Glipizide (did not tolerate Metformin)  -on aspirin and statin therapy  -diabetes complications:  n/a  -last seen by diabetes education 2016  -home BG monitoring reveals:  66% in target, n=3, BG range: 113-223    Primary concern: wonders if the Glipizide is working. History of weight watchers.    Discussed:  basic physiology, natural progression of type 2, target BG and a1c, mechanism of action of medication (Metformin, Glipizide, Glp-1 and Gip), basic carbohydrate counting, meal plan, exercise, 5-10% (11-22#) weight loss in 6 months (Jan 2025), potential complications of uncontrolled diabetes, care team, resources.  Patient expressed understanding.      Patient's most recent   Lab Results   Component Value Date    A1C 9.1 07/03/2024    A1C 6.1 07/01/2021     is not meeting goal of <8.0    Diabetes knowledge and skills assessment:   Patient is knowledgeable in diabetes management concepts related to: Taking Medication    Continue education with the following diabetes management concepts: Healthy Eating, Being Active, Monitoring, Taking Medication, Problem Solving, Reducing Risks, and Healthy Coping    Based on learning assessment above, most appropriate setting for further diabetes education would be: Individual setting.      PLAN     Increase exercise by walking your dog consistently twice around the loop and walking more when golfing.  Begin to consider what you will do in the winter. (Consider joining the Y.)  Continue to work on healthy eating 3 times/day.  Do not skip meals (add lunch).  Aim for 45-75 grams of carbohydrate/meal and 0-30 grams of carbohydrate/snack.    Consider recording food intake.  Consider an jj.    Increase BG testing to 3 times/day:  fasting, before and 2 hrs after the start of  "the meal.  Continue to take Glipizide as prescribed.  Weight loss goal of 11-22# by Jan 2025.  Follow up with Mo Granados  Follow up with Diabetes Education 8/28/24 at 8am.  If you have questions you can send them through [employer] or call Diabetes Education Triage [phone].  For Scheduling call [phone].    See Care Plan for co-developed, patient-state behavior change goals.  AVS provided for patient today.    Education Materials Provided:  BG Log Sheet and Goals for Diabetes Care      SUBJECTIVE/OBJECTIVE:  Presents for: Individual review  Accompanied by: Self  Diabetes education in the past 24mo: No  Diabetes type: Type 2  Date of diagnosis: 2016  Disease course: Worsening  How confident are you filling out medical forms by yourself:: Extremely  Diabetes management related comments/concerns: No  Transportation concerns: No  Other concerns:: Glasses  Cultural Influences/Ethnic Background:  Not  or     Diabetes Symptoms & Complications:  Diabetes Related Symptoms: None  Weight trend: Stable  Symptom course: Stable  Disease course: Worsening       Patient Problem List and Family Medical History reviewed for relevant medical history, current medical status, and diabetes risk factors.    Vitals:  There were no vitals taken for this visit.  Estimated body mass index is 31.38 kg/m  as calculated from the following:    Height as of 7/3/24: 1.803 m (5' 11\").    Weight as of 7/3/24: 102.1 kg (225 lb).   Last 3 BP:   BP Readings from Last 3 Encounters:   07/03/24 112/72   07/27/23 108/72   12/05/22 134/82       History   Smoking Status    Former    Packs/day: 1.50    Years: 10.00    Types: Cigarettes    Start date: 10/1/1972    Quit date: 10/1/1992   Smokeless Tobacco    Never       Labs:  Lab Results   Component Value Date    A1C 9.1 07/03/2024    A1C 6.1 07/01/2021     Lab Results   Component Value Date     07/03/2024     04/04/2022    GLC 96 07/01/2021     Lab Results " "  Component Value Date    LDL 44 07/03/2024    LDL 47 07/01/2021     HDL Cholesterol   Date Value Ref Range Status   07/01/2021 36 (L) >39 mg/dL Final     Direct Measure HDL   Date Value Ref Range Status   07/03/2024 32 (L) >=40 mg/dL Final   ]  GFR Estimate   Date Value Ref Range Status   07/03/2024 >90 >60 mL/min/1.73m2 Final     Comment:     eGFR calculated using 2021 CKD-EPI equation.   07/01/2021 65 >60 mL/min/[1.73_m2] Final     Comment:     Non  GFR Calc  Starting 12/18/2018, serum creatinine based estimated GFR (eGFR) will be   calculated using the Chronic Kidney Disease Epidemiology Collaboration   (CKD-EPI) equation.       GFR Estimate If Black   Date Value Ref Range Status   07/01/2021 75 >60 mL/min/[1.73_m2] Final     Comment:      GFR Calc  Starting 12/18/2018, serum creatinine based estimated GFR (eGFR) will be   calculated using the Chronic Kidney Disease Epidemiology Collaboration   (CKD-EPI) equation.       Lab Results   Component Value Date    CR 0.75 07/03/2024    CR 1.15 07/01/2021     No results found for: \"MICROALBUMIN\"    Healthy Eating:  Healthy Eating Assessed Today: Yes  Cultural/Nondenominational diet restrictions?: No  Do you have any food allergies or intolerances?: No  How many times a week on average do you eat food made away from home (restaurant/take-out)?: 2  Meals include: Dinner, Breakfast, Evening Snack  Breakfast: 6:30-7am:  5oz OJ, protein drink (low carb), banana, coffee- w/ flavored creamer or sf creamer (~3- 4 carb choices)  Lunch: often skips (2 x/week will have a sandwich), drinks coffee throughout the day  Dinner: 5pm: [employer] 3 sl of large pizza, diet pop  Snacks: HS: pie  Beverages: Water, Coffee, Juice, Diet soda, Other (see Comments)  Please elaborate:: Protein drink every morning    Being Active:  Being Active Assessed Today: Yes  Exercise:: Yes (does golf (sometimes walks))  Days per week of moderate to strenuous exercise (like a brisk " "walk): 7  On average, minutes per day of exercise at this level: 40  How intense was your typical exercise? : Light (like stretching or slow walking)  Exercise Minutes per Week: 280  Barrier to exercise: None    Monitoring:  Monitoring Assessed Today: Yes  Did patient bring glucose meter to appointment? : No  Blood Glucose Meter: Accu-chek  Times checking blood sugar at home (number): 2 (had gone a long time without checking)  Times checking blood sugar at home (per): Day  Blood glucose trend:  (FBG today 123, yesterday 223, during the day yesterday 113)    Taking Medications:  Diabetes Medication(s)       Sulfonylureas       glipiZIDE (GLUCOTROL XL) 5 MG 24 hr tablet Take 1 tablet (5 mg) by mouth daily Hold until patient calls for refill.            Taking Medication Assessed Today: Yes  Current Treatments: Oral Medication (taken by mouth) (Glipizide just increased recently)  Problems taking diabetes medications regularly?: No  Diabetes medication side effects?: Yes (increased shakiness, but \"I have had a shake forever\")    Problem Solving:                 Reducing Risks:  Reducing Risks Assessed Today: Yes  Diabetes Risks: Age over 45 years  CAD Risks: Diabetes Mellitus, Male sex  Has dilated eye exam at least once a year?: Yes  Sees dentist every 6 months?: Yes  Feet checked by healthcare provider in the last year?: Yes    Healthy Coping:  Healthy Coping Assessed Today: Yes  Emotional response to diabetes: Acceptance  Informal Support system:: Children, Family, Parent, Spouse  Stage of change: ACTION (Actively working towards change)  Patient Activation Measure Survey Score:       No data to display                  Care Plan and Education Provided:  Healthy Eating: Carbohydrate Counting and Weight Management, Being Active: Finding a physical activity routine that works for you, Monitoring: Frequency of monitoring, Individual glucose targets, and Log and interpret results, Taking Medication: Action of prescribed " medication(s), Problem Solving: High glucose - causes, signs/symptoms, treatment and prevention, and Reducing Risks: Goal for A1c, how it relates to glucose and how often to check and Preventing cardiovascular disease, including blood pressure goals, lipid goals, recommendations for cardioprotective medications, statins, and aspirin    Blessing Ortega RD, [facility], 11:06 AM, 7/11/2024    Time Spent: 60 minutes  Encounter Type: Individual    Any diabetes medication dose changes were made via the CDE Protocol per the patient's referring provider. A copy of this encounter was shared with the provider.

## 2024-07-16 DIAGNOSIS — I10 BENIGN ESSENTIAL HYPERTENSION: ICD-10-CM

## 2024-07-16 DIAGNOSIS — E78.2 MIXED HYPERLIPIDEMIA: ICD-10-CM

## 2024-07-16 DIAGNOSIS — E11.9 TYPE 2 DIABETES MELLITUS WITHOUT COMPLICATION, WITHOUT LONG-TERM CURRENT USE OF INSULIN (H): ICD-10-CM

## 2024-07-17 ENCOUNTER — MYC REFILL (OUTPATIENT)
Dept: FAMILY MEDICINE | Facility: CLINIC | Age: 69
End: 2024-07-17
Payer: COMMERCIAL

## 2024-07-17 DIAGNOSIS — E11.9 TYPE 2 DIABETES MELLITUS WITHOUT COMPLICATION, WITHOUT LONG-TERM CURRENT USE OF INSULIN (H): ICD-10-CM

## 2024-07-17 RX ORDER — ATORVASTATIN CALCIUM 40 MG/1
40 TABLET, FILM COATED ORAL DAILY
Qty: 90 TABLET | Refills: 3 | OUTPATIENT
Start: 2024-07-17

## 2024-07-17 RX ORDER — CHLORTHALIDONE 25 MG/1
12.5 TABLET ORAL DAILY
Qty: 45 TABLET | Refills: 3 | OUTPATIENT
Start: 2024-07-17

## 2024-07-26 ENCOUNTER — TRANSFERRED RECORDS (OUTPATIENT)
Dept: HEALTH INFORMATION MANAGEMENT | Facility: CLINIC | Age: 69
End: 2024-07-26
Payer: COMMERCIAL

## 2024-08-06 ENCOUNTER — TRANSFERRED RECORDS (OUTPATIENT)
Dept: HEALTH INFORMATION MANAGEMENT | Facility: CLINIC | Age: 69
End: 2024-08-06
Payer: COMMERCIAL

## 2024-08-09 ENCOUNTER — TRANSCRIBE ORDERS (OUTPATIENT)
Dept: OTHER | Age: 69
End: 2024-08-09

## 2024-08-09 DIAGNOSIS — M25.551 RIGHT HIP PAIN: Primary | ICD-10-CM

## 2024-08-13 ASSESSMENT — ACTIVITIES OF DAILY LIVING (ADL)
GETTING_INTO_AND_OUT_OF_A_BATHTUB: SLIGHT DIFFICULTY
ADL_HIGHEST_POTENTIAL_SCORE: 68
HOW_WOULD_YOU_RATE_YOUR_CURRENT_LEVEL_OF_FUNCTION?: NEARLY NORMAL
GETTING_INTO_AND_OUT_OF_A_BATHTUB: SLIGHT DIFFICULTY
GETTING INTO AND OUT OF AN AVERAGE CAR: SLIGHT DIFFICULTY
ROLLING OVER IN BED: EXTREME DIFFICULTY
HEAVY_WORK: SLIGHT DIFFICULTY
HOS_ADL_HIGHEST_POTENTIAL_SCORE: 68
TWISTING/PIVOTING ON INVOLVED LEG: SLIGHT DIFFICULTY
PUTTING_ON_SOCKS_AND_SHOES: SLIGHT DIFFICULTY
WALKING_FOR_APPROXIMATELY_10_MINUTES: NO DIFFICULTY AT ALL
HOS_ADL_SCORE(%): 79.41
TWISTING/PIVOTING_ON_INVOLVED_LEG: SLIGHT DIFFICULTY
LIGHT_TO_MODERATE_WORK: SLIGHT DIFFICULTY
ADL_COUNT: 17
SPORTS_COUNT: 9
PLEASE_INDICATE_YOR_PRIMARY_REASON_FOR_REFERRAL_TO_THERAPY:: HIP
GOING UP 1 FLIGHT OF STAIRS: NO DIFFICULTY AT ALL
SWINGING_OBJECTS_LIKE_A_GOLF_CLUB: SLIGHT DIFFICULTY
WALKING_DOWN_STEEP_HILLS: NO DIFFICULTY AT ALL
STANDING_FOR_15_MINUTES: SLIGHT DIFFICULTY
ABILITY_TO_PERFORM_ACTIVITY_WITH_YOUR_NORMAL_TECHNIQUE: SLIGHT DIFFICULTY
WALKING_15_MINUTES_OR_GREATER: SLIGHT DIFFICULTY
WALKING_INITIALLY: SLIGHT DIFFICULTY
SPORTS_HIGHEST_POTENTIAL_SCORE: 36
ADL_SCORE(%): 0
LOW_IMPACT_ACTIVITIES_LIKE_FAST_WALKING: SLIGHT DIFFICULTY
STANDING FOR 15 MINUTES: SLIGHT DIFFICULTY
LIGHT_TO_MODERATE_WORK: SLIGHT DIFFICULTY
PUTTING ON SOCKS AND SHOES: SLIGHT DIFFICULTY
DEEP SQUATTING: MODERATE DIFFICULTY
WALKING_UP_STEEP_HILLS: NO DIFFICULTY AT ALL
SITTING FOR 15 MINUTES: NO DIFFICULTY AT ALL
RECREATIONAL ACTIVITIES: SLIGHT DIFFICULTY
HEAVY_WORK: SLIGHT DIFFICULTY
DEEP_SQUATTING: MODERATE DIFFICULTY
STARTING_AND_STOPPING_QUICKLY: SLIGHT DIFFICULTY
SPORTS_TOTAL_ITEM_SCORE: 0
WALKING_APPROXIMATELY_10_MINUTES: NO DIFFICULTY AT ALL
GOING_UP_1_FLIGHT_OF_STAIRS: NO DIFFICULTY AT ALL
GOING_DOWN_1_FLIGHT_OF_STAIRS: NO DIFFICULTY AT ALL
WALKING_15_MINUTES_OR_GREATER: SLIGHT DIFFICULTY
STEPPING UP AND DOWN CURBS: NO DIFFICULTY AT ALL
GETTING_INTO_AND_OUT_OF_AN_AVERAGE_CAR: SLIGHT DIFFICULTY
WALKING_INITIALLY: SLIGHT DIFFICULTY
ROLLING_OVER_IN_BED: EXTREME DIFFICULTY
WALKING_DOWN_STEEP_HILLS: NO DIFFICULTY AT ALL
SPORTS_SCORE(%): 0
STEPPING_UP_AND_DOWN_CURBS: NO DIFFICULTY AT ALL
ADL_TOTAL_ITEM_SCORE: 0
SITTING_FOR_15_MINUTES: NO DIFFICULTY AT ALL
RECREATIONAL_ACTIVITIES: SLIGHT DIFFICULTY
WALKING_UP_STEEP_HILLS: NO DIFFICULTY AT ALL
ABILITY_TO_PARTICIPATE_IN_YOUR_DESIRED_SPORT_AS_LONG_AS_YOU_WOULD_LIKE: SLIGHT DIFFICULTY
HOS_ADL_ITEM_SCORE_TOTAL: 54
GOING DOWN 1 FLIGHT OF STAIRS: NO DIFFICULTY AT ALL

## 2024-08-14 ENCOUNTER — THERAPY VISIT (OUTPATIENT)
Dept: PHYSICAL THERAPY | Facility: CLINIC | Age: 69
End: 2024-08-14
Attending: STUDENT IN AN ORGANIZED HEALTH CARE EDUCATION/TRAINING PROGRAM
Payer: COMMERCIAL

## 2024-08-14 DIAGNOSIS — M25.551 HIP PAIN, RIGHT: Primary | ICD-10-CM

## 2024-08-14 PROCEDURE — 97140 MANUAL THERAPY 1/> REGIONS: CPT | Mod: GP | Performed by: PHYSICAL THERAPIST

## 2024-08-14 PROCEDURE — 97110 THERAPEUTIC EXERCISES: CPT | Mod: GP | Performed by: PHYSICAL THERAPIST

## 2024-08-14 PROCEDURE — 97161 PT EVAL LOW COMPLEX 20 MIN: CPT | Mod: GP | Performed by: PHYSICAL THERAPIST

## 2024-08-14 NOTE — PROGRESS NOTES
PHYSICAL THERAPY EVALUATION  Type of Visit: Evaluation              Subjective   About 6 months ago started having increased right anterior, anterior/lateral and posterior buttock pain. Has hx of B hip replaced but right hip replaced in 2005 posterior approach.  Has history of disc bulges in the back and has had 3 injections over the last several years to help with back and right leg pain.  Most recent injection to help with anterior groin/thigh pain helped resolve some discomfort but not all.  Still has pain across the groin, outside of the hip and the buttock.       Presenting condition or subjective complaint: Pain in hip area  Date of onset: 02/14/24    Relevant medical history:   type II DM, HTN   Dates & types of surgery: Hip replacement 01/2005,,,05/2013    Prior diagnostic imaging/testing results: MRI; X-ray     Prior therapy history for the same diagnosis, illness or injury: No      Prior Level of Function  Transfers:   Ambulation:   ADL:   IADL:     Living Environment  Social support: With a significant other or spouse   Type of home: House   Stairs to enter the home: Yes 3 Is there a railing: Yes     Ramp: No   Stairs inside the home: Yes 13 Is there a railing: Yes     Help at home: None; Other  Equipment owned: Straight Cane; Walker; Walker with wheels; Crutches     Employment: No    Hobbies/Interests: Grandchildren, golf    Patient goals for therapy: Be painless    Pain assessment:      Objective   HIP EVALUATION  PAIN: Pain Level at Rest: 0/10  Pain Level with Use: 6/10  Pain Location: hip  Pain Quality: Sharp  Pain Frequency: intermittent  Pain is Worst: daytime or sometimes at night with rolling  Pain is Exacerbated By: rolling, getting out of bed,, getting in/out of car, sitting too long and getting going.   Pain is Relieved By: injection  INTEGUMENTARY (edema, incisions):   POSTURE: Standing Posture: Rounded shoulders, Thoracic kyphosis increased  GAIT:   Weightbearing Status: WBAT  Assistive  Device(s): None  Gait Deviations:  walks with weight more up on toes,   BALANCE/PROPRIOCEPTION: Single Leg Stance Eyes Open (seconds): 3 sec B, some mild increased right hip pain standing on R  WEIGHTBEARING ALIGNMENT:   NON-WEIGHTBEARING ALIGNMENT:    ROM:   (Degrees) Left AROM Left PROM  Right AROM Right PROM   Hip Flexion   120 120   Hip Extension   5 from 0     Hip Abduction   30    Hip Adduction   10    Hip Internal Rotation   15    Hip External Rotation   30    Knee Flexion   WNL    Knee Extension   WNL    Lumbar Side glide WNL WNL   Lumbar Flexion WNL   Lumbar Extension WNL     PELVIC/SI SCREEN:   STRENGTH:   Pain: - none + mild ++ moderate +++ severe  Strength Scale: 0-5/5 Left Right   Hip Flexion  4   Hip Extension  4-   Hip Abduction  3+   Hip Adduction  4   Hip Internal Rotation  4-   Hip External Rotation  3+   Knee Flexion  4+   Knee Extension  4+     LE FLEXIBILITY:     SPECIAL TESTS:    Left Right   MATTIE  Negative    FADIR/Labrum/MICHELLE  Negative    Femoral Nerve  Negative    Mario's  Negative    Piriformis  Negative    Quadrant Testing  Negative    SLR  Negative    Slump  Negative    Stork with Extension     Gideon  Positive          FUNCTIONAL TESTS:   PALPATION:  ttp across illiopsoas and increased myofascial restrictions on right   JOINT MOBILITY: WNL femoral joint R     Assessment & Plan   CLINICAL IMPRESSIONS  Medical Diagnosis: right hip pain    Treatment Diagnosis: right hip pain   Impression/Assessment: Patient is a 68 year old male with right hip pain complaints.  The following significant findings have been identified: Pain, Decreased ROM/flexibility, Decreased strength, Impaired muscle performance, and Decreased activity tolerance. These impairments interfere with their ability to perform self care tasks, work tasks, recreational activities, household chores, driving , household mobility, and community mobility as compared to previous level of function.     Clinical Decision Making  (Complexity):  Clinical Presentation: Stable/Uncomplicated  Clinical Presentation Rationale: based on medical and personal factors listed in PT evaluation  Clinical Decision Making (Complexity): Low complexity    PLAN OF CARE  Treatment Interventions:  Interventions: Gait Training, Manual Therapy, Neuromuscular Re-education, Therapeutic Activity, Therapeutic Exercise, Self-Care/Home Management    Long Term Goals     PT Goal 1  Goal Identifier: 1  Goal Description: Patient will be able to complete suzanne test stretch on right with no right hip pain or discomfort.  Target Date: 09/11/24  PT Goal 2  Goal Identifier: 2  Goal Description: Patient will be independent in HEP in order to improve strength, ROM and pain outside of PT.  Target Date: 09/11/24  PT Goal 3  Goal Identifier: 3  Goal Description: Patient will be able to roll in bed with no right hip discomfort.  Target Date: 10/09/24  PT Goal 4  Goal Identifier: 4  Goal Description: Patient will improve right hip abd strength to 4+/5 in order to improve strength to stabilize hip joint with prolonged walking.  Target Date: 10/09/24      Frequency of Treatment: 1x/week  Duration of Treatment: 8 weeks    Recommended Referrals to Other Professionals:   Education Assessment:   Learner/Method: Patient  Education Comments: educated on role of PT, POC and HEP    Risks and benefits of evaluation/treatment have been explained.   Patient/Family/caregiver agrees with Plan of Care.     Evaluation Time:     PT Eval, Low Complexity Minutes (77469): 16       Signing Clinician: Davina Barcenas, PT        [facility]                                                                                   OUTPATIENT PHYSICAL THERAPY      PLAN OF TREATMENT FOR OUTPATIENT REHABILITATION   Patient's Last Name, First Name, Jules Goff YOB: 1955   Provider's Name   [facility]   Medical Record No.  [id number]      Onset Date: 02/14/24  Start of Care Date: 08/14/24     Medical Diagnosis:  right hip pain      PT Treatment Diagnosis:  right hip pain Plan of Treatment  Frequency/Duration: 1x/week/ 8 weeks    Certification date from 08/14/24 to 10/09/24         See note for plan of treatment details and functional goals     Davina Barcenas, PT                         I CERTIFY THE NEED FOR THESE SERVICES FURNISHED UNDER        THIS PLAN OF TREATMENT AND WHILE UNDER MY CARE .             Physician Signature               Date    X_____________________________________________________                  Referring Provider:  Nikhil Kenney    Initial Assessment  See Epic Evaluation- Start of Care Date: 08/14/24

## 2024-08-15 ENCOUNTER — TRANSFERRED RECORDS (OUTPATIENT)
Dept: HEALTH INFORMATION MANAGEMENT | Facility: CLINIC | Age: 69
End: 2024-08-15
Payer: COMMERCIAL

## 2024-08-17 DIAGNOSIS — E78.2 MIXED HYPERLIPIDEMIA: ICD-10-CM

## 2024-08-17 DIAGNOSIS — A60.01 HERPES SIMPLEX INFECTION OF PENIS: ICD-10-CM

## 2024-08-17 DIAGNOSIS — E11.9 TYPE 2 DIABETES MELLITUS WITHOUT COMPLICATION, WITHOUT LONG-TERM CURRENT USE OF INSULIN (H): ICD-10-CM

## 2024-08-17 DIAGNOSIS — I10 BENIGN ESSENTIAL HYPERTENSION: ICD-10-CM

## 2024-08-20 RX ORDER — CHLORTHALIDONE 25 MG/1
12.5 TABLET ORAL DAILY
Qty: 45 TABLET | Refills: 3 | Status: SHIPPED | OUTPATIENT
Start: 2024-08-20

## 2024-08-20 RX ORDER — ACYCLOVIR 400 MG/1
400 TABLET ORAL 2 TIMES DAILY
Qty: 180 TABLET | Refills: 3 | Status: SHIPPED | OUTPATIENT
Start: 2024-08-20

## 2024-08-20 RX ORDER — ATORVASTATIN CALCIUM 40 MG/1
40 TABLET, FILM COATED ORAL DAILY
Qty: 90 TABLET | Refills: 3 | Status: SHIPPED | OUTPATIENT
Start: 2024-08-20

## 2024-08-21 ENCOUNTER — THERAPY VISIT (OUTPATIENT)
Dept: PHYSICAL THERAPY | Facility: CLINIC | Age: 69
End: 2024-08-21
Attending: STUDENT IN AN ORGANIZED HEALTH CARE EDUCATION/TRAINING PROGRAM
Payer: COMMERCIAL

## 2024-08-21 DIAGNOSIS — M25.551 HIP PAIN, RIGHT: Primary | ICD-10-CM

## 2024-08-21 PROCEDURE — 97140 MANUAL THERAPY 1/> REGIONS: CPT | Mod: GP | Performed by: PHYSICAL THERAPIST

## 2024-08-21 PROCEDURE — 97110 THERAPEUTIC EXERCISES: CPT | Mod: GP | Performed by: PHYSICAL THERAPIST

## 2024-08-28 ENCOUNTER — ALLIED HEALTH/NURSE VISIT (OUTPATIENT)
Dept: EDUCATION SERVICES | Facility: CLINIC | Age: 69
End: 2024-08-28
Payer: COMMERCIAL

## 2024-08-28 ENCOUNTER — THERAPY VISIT (OUTPATIENT)
Dept: PHYSICAL THERAPY | Facility: CLINIC | Age: 69
End: 2024-08-28
Attending: STUDENT IN AN ORGANIZED HEALTH CARE EDUCATION/TRAINING PROGRAM
Payer: COMMERCIAL

## 2024-08-28 DIAGNOSIS — E11.65 TYPE 2 DIABETES MELLITUS WITH HYPERGLYCEMIA, WITHOUT LONG-TERM CURRENT USE OF INSULIN (H): Primary | ICD-10-CM

## 2024-08-28 DIAGNOSIS — M25.551 HIP PAIN, RIGHT: Primary | ICD-10-CM

## 2024-08-28 PROCEDURE — G0108 DIAB MANAGE TRN  PER INDIV: HCPCS | Performed by: DIETITIAN, REGISTERED

## 2024-08-28 PROCEDURE — 97110 THERAPEUTIC EXERCISES: CPT | Mod: GP | Performed by: PHYSICAL THERAPIST

## 2024-08-28 RX ORDER — LANCETS
EACH MISCELLANEOUS
Qty: 100 EACH | Refills: 4 | Status: SHIPPED | OUTPATIENT
Start: 2024-08-28

## 2024-08-28 NOTE — LETTER
8/28/2024         RE: Jules Cheng  [address] [address] [facility] [id number]        Dear Colleague,    Thank you for referring your patient, Jules Cheng, to the [facility]. Please see a copy of my visit note below.    Diabetes Self-Management Education & Support    Presents for: Follow-up    Type of Service: In Person Visit      ASSESSMENT:  -type 2 diabetes for ~8 years  -recent a1c of 9.1 (previous 7.5, 7.1)  -currently being managed with Glipizide  -on aspirin and statin therapy  -diabetes complications:  Coronary artery calcification  -last seen by diabetes education 7/11/24  -home BG monitoring reveals:  32% in target (FASTING BLOOD SUGAR are in target 0%)    Primary concern: choosing smaller portions and trying to make some healthier choices.  Keeping daily food log and brings to appointment.  Asks about fastclix barrels.      Gave sample softclix and placed prescription for lancets.      Discussed:  basic physiology, natural progression of type 2, target BG and a1c, mechanism of action of medication, basic carbohydrate counting, meal plan, label reading, exercise, hypoglycemia symptoms and treatment, 5-10% weight loss in 6 months, foot care, potential complications of uncontrolled diabetes, care team, resources.  Patient expressed understanding.      Patient's most recent   Lab Results   Component Value Date    A1C 9.1 07/03/2024    A1C 6.1 07/01/2021     is not meeting goal of <7.0    Diabetes knowledge and skills assessment:   Patient is knowledgeable in diabetes management concepts related to: Monitoring and Taking Medication    Continue education with the following diabetes management concepts: Healthy Eating, Being Active, Monitoring, Taking Medication, Problem Solving, Reducing Risks, and Healthy Coping    Based on learning assessment above, most appropriate setting for further diabetes education would be: Individual setting.      PLAN     Increase exercise  by walking your dog consistently twice around the loop and walking more when golfing.  Begin to consider what you will do in the winter. (Consider joining the Y.)  Continue to work on healthy eating 3 times/day.  Do not skip meals (add lunch).  Aim for 45-75 grams of carbohydrate/meal and 0-30 grams of carbohydrate/snack.    Consider recording food intake.  Consider an jj.    Increase BG testing to 3 times/day:  fasting, before and 2 hrs after the start of the meal.  Continue to take Glipizide as prescribed.  Weight loss goal of 11-22# by Jan 2025.  If you have 2 or more readings less than 70 mg/dL in one week reach out to your provider.    Follow up with Mo Granados as directed.  Have labs drawn as scheduled on 10/3/24.    Follow up with Diabetes Education 10/3/24 at 8am.  If you have questions you can send them through [employer] or call Diabetes Education Triage [phone].  For Scheduling call [phone].    See Care Plan for co-developed, patient-state behavior change goals.  AVS provided for patient today.    Education Materials Provided:  No new materials provided today      SUBJECTIVE/OBJECTIVE:  Presents for: Follow-up  Accompanied by: Self  Diabetes education in the past 24mo: No  Diabetes type: Type 2  Date of diagnosis: 2016  Disease course: Worsening  How confident are you filling out medical forms by yourself:: Extremely  Diabetes management related comments/concerns: No  Transportation concerns: No  Other concerns:: Glasses  Cultural Influences/Ethnic Background:  Not  or     Diabetes Symptoms & Complications:  Diabetes Related Symptoms: None  Weight trend: Stable  Symptom course: Stable  Disease course: Worsening       Patient Problem List and Family Medical History reviewed for relevant medical history, current medical status, and diabetes risk factors.    Vitals:  There were no vitals taken for this visit.  Estimated body mass index is 31.38 kg/m  as calculated from  "the following:    Height as of 7/3/24: 1.803 m (5' 11\").    Weight as of 7/3/24: 102.1 kg (225 lb).   Last 3 BP:   BP Readings from Last 3 Encounters:   07/03/24 112/72   07/27/23 108/72   12/05/22 134/82       History   Smoking Status     Former     Packs/day: 1.50     Years: 10.00     Types: Cigarettes     Start date: 10/1/1972     Quit date: 10/1/1992   Smokeless Tobacco     Never       Labs:  Lab Results   Component Value Date    A1C 9.1 07/03/2024    A1C 6.1 07/01/2021     Lab Results   Component Value Date     07/03/2024     04/04/2022    GLC 96 07/01/2021     Lab Results   Component Value Date    LDL 44 07/03/2024    LDL 47 07/01/2021     HDL Cholesterol   Date Value Ref Range Status   07/01/2021 36 (L) >39 mg/dL Final     Direct Measure HDL   Date Value Ref Range Status   07/03/2024 32 (L) >=40 mg/dL Final   ]  GFR Estimate   Date Value Ref Range Status   07/03/2024 >90 >60 mL/min/1.73m2 Final     Comment:     eGFR calculated using 2021 CKD-EPI equation.   07/01/2021 65 >60 mL/min/[1.73_m2] Final     Comment:     Non  GFR Calc  Starting 12/18/2018, serum creatinine based estimated GFR (eGFR) will be   calculated using the Chronic Kidney Disease Epidemiology Collaboration   (CKD-EPI) equation.       GFR Estimate If Black   Date Value Ref Range Status   07/01/2021 75 >60 mL/min/[1.73_m2] Final     Comment:      GFR Calc  Starting 12/18/2018, serum creatinine based estimated GFR (eGFR) will be   calculated using the Chronic Kidney Disease Epidemiology Collaboration   (CKD-EPI) equation.       Lab Results   Component Value Date    CR 0.75 07/03/2024    CR 1.15 07/01/2021     No results found for: \"MICROALBUMIN\"    Healthy Eating:  Healthy Eating Assessed Today: Yes  Cultural/Taoist diet restrictions?: No  Do you have any food allergies or intolerances?: No  How many times a week on average do you eat food made away from home (restaurant/take-out)?: 2  Meals " "include: Dinner, Breakfast, Evening Snack  Breakfast: 6:30-7am:  5oz OJ, protein drink (low carb), banana, coffee- w/ flavored creamer or sf creamer (~3- 4 carb choices)  Lunch: doing better with eating something at this time, drinks coffee throughout the day  Dinner: 5pm: fried fish, fries, diet pop  Snacks: HS: ice cream  Beverages: Water, Coffee, Juice, Diet soda, Other (see Comments)  Please elaborate:: Protein drink every morning    Being Active:  Being Active Assessed Today: Yes  Exercise:: Yes (does golf (sometimes walks))  Days per week of moderate to strenuous exercise (like a brisk walk): 7  On average, minutes per day of exercise at this level: 40  How intense was your typical exercise? : Light (like stretching or slow walking)  Exercise Minutes per Week: 280  Barrier to exercise: None    Monitoring:  Monitoring Assessed Today: Yes  Did patient bring glucose meter to appointment? : No  Blood Glucose Meter: Accu-chek  Times checking blood sugar at home (number): 2 (had gone a long time without checking)  Times checking blood sugar at home (per): Day  Blood glucose trend:    Date Breakfast  Lunch  Dinner  Bedtime    Before After Before After Before After    8/28/24 192         8/27 135         8/26 144  133       8/25 145     145    8/24     146     8/23 140         8/22 180           Based on 2 weeks of data:  32% in target, 0% FASTING BLOOD SUGAR in target    Taking Medications:  Diabetes Medication(s)       Sulfonylureas       glipiZIDE (GLUCOTROL XL) 5 MG 24 hr tablet Take 1 tablet (5 mg) by mouth daily Hold until patient calls for refill.            Taking Medication Assessed Today: Yes  Current Treatments: Oral Medication (taken by mouth) (Glipizide just increased recently)  Problems taking diabetes medications regularly?: No  Diabetes medication side effects?: Yes (increased shakiness, but \"I have had a shake forever\")    Problem Solving:                 Reducing Risks:  Reducing Risks Assessed " Today: Yes  Diabetes Risks: Age over 45 years  CAD Risks: Diabetes Mellitus, Male sex  Has dilated eye exam at least once a year?: Yes  Sees dentist every 6 months?: Yes  Feet checked by healthcare provider in the last year?: Yes    Healthy Coping:  Healthy Coping Assessed Today: Yes  Emotional response to diabetes: Acceptance  Informal Support system:: Children, Family, Parent, Spouse  Stage of change: ACTION (Actively working towards change)  Patient Activation Measure Survey Score:       No data to display                  Care Plan and Education Provided:  Healthy Eating: Portion control, Being Active: Finding a physical activity routine that works for you, Monitoring: Frequency of monitoring, Log and interpret results, and Purpose, Taking Medication: Action of prescribed medication(s) and When to take medication(s), Problem Solving: Low glucose - causes, signs/symptoms, treatment and prevention and When to call a health care provider, and Reducing Risks: Complications of diabetes and Goal for A1c, how it relates to glucose and how often to check    Blessing Ortega RD, [facility], 8:42 AM, 8/28/2024    Time Spent: 60 minutes  Encounter Type: Individual    Any diabetes medication dose changes were made via the CDE Protocol per the patient's primary care provider. A copy of this encounter was shared with the provider.

## 2024-08-28 NOTE — PROGRESS NOTES
Diabetes Self-Management Education & Support    Presents for: Follow-up    Type of Service: In Person Visit      ASSESSMENT:  -type 2 diabetes for ~8 years  -recent a1c of 9.1 (previous 7.5, 7.1)  -currently being managed with Glipizide  -on aspirin and statin therapy  -diabetes complications:  Coronary artery calcification  -last seen by diabetes education 7/11/24  -home BG monitoring reveals:  32% in target (FASTING BLOOD SUGAR are in target 0%)    Primary concern: choosing smaller portions and trying to make some healthier choices.  Keeping daily food log and brings to appointment.  Asks about fastclix barrels.      Gave sample softclix and placed prescription for lancets.      Discussed:  basic physiology, natural progression of type 2, target BG and a1c, mechanism of action of medication, basic carbohydrate counting, meal plan, label reading, exercise, hypoglycemia symptoms and treatment, 5-10% weight loss in 6 months, foot care, potential complications of uncontrolled diabetes, care team, resources.  Patient expressed understanding.      Patient's most recent   Lab Results   Component Value Date    A1C 9.1 07/03/2024    A1C 6.1 07/01/2021     is not meeting goal of <7.0    Diabetes knowledge and skills assessment:   Patient is knowledgeable in diabetes management concepts related to: Monitoring and Taking Medication    Continue education with the following diabetes management concepts: Healthy Eating, Being Active, Monitoring, Taking Medication, Problem Solving, Reducing Risks, and Healthy Coping    Based on learning assessment above, most appropriate setting for further diabetes education would be: Individual setting.      PLAN     Increase exercise by walking your dog consistently twice around the loop and walking more when golfing.  Begin to consider what you will do in the winter. (Consider joining the Y.)  Continue to work on healthy eating 3 times/day.  Do not skip meals (add lunch).  Aim for 45-75 grams  "of carbohydrate/meal and 0-30 grams of carbohydrate/snack.    Consider recording food intake.  Consider an jj.    Increase BG testing to 3 times/day:  fasting, before and 2 hrs after the start of the meal.  Continue to take Glipizide as prescribed.  Weight loss goal of 11-22# by Jan 2025.  If you have 2 or more readings less than 70 mg/dL in one week reach out to your provider.    Follow up with Mo Granados as directed.  Have labs drawn as scheduled on 10/3/24.    Follow up with Diabetes Education 10/3/24 at 8am.  If you have questions you can send them through [employer] or call Diabetes Education Triage [phone].  For Scheduling call [phone].    See Care Plan for co-developed, patient-state behavior change goals.  AVS provided for patient today.    Education Materials Provided:  No new materials provided today      SUBJECTIVE/OBJECTIVE:  Presents for: Follow-up  Accompanied by: Self  Diabetes education in the past 24mo: No  Diabetes type: Type 2  Date of diagnosis: 2016  Disease course: Worsening  How confident are you filling out medical forms by yourself:: Extremely  Diabetes management related comments/concerns: No  Transportation concerns: No  Other concerns:: Glasses  Cultural Influences/Ethnic Background:  Not  or     Diabetes Symptoms & Complications:  Diabetes Related Symptoms: None  Weight trend: Stable  Symptom course: Stable  Disease course: Worsening       Patient Problem List and Family Medical History reviewed for relevant medical history, current medical status, and diabetes risk factors.    Vitals:  There were no vitals taken for this visit.  Estimated body mass index is 31.38 kg/m  as calculated from the following:    Height as of 7/3/24: 1.803 m (5' 11\").    Weight as of 7/3/24: 102.1 kg (225 lb).   Last 3 BP:   BP Readings from Last 3 Encounters:   07/03/24 112/72   07/27/23 108/72   12/05/22 134/82       History   Smoking Status    Former    Packs/day: 1.50    " "Years: 10.00    Types: Cigarettes    Start date: 10/1/1972    Quit date: 10/1/1992   Smokeless Tobacco    Never       Labs:  Lab Results   Component Value Date    A1C 9.1 07/03/2024    A1C 6.1 07/01/2021     Lab Results   Component Value Date     07/03/2024     04/04/2022    GLC 96 07/01/2021     Lab Results   Component Value Date    LDL 44 07/03/2024    LDL 47 07/01/2021     HDL Cholesterol   Date Value Ref Range Status   07/01/2021 36 (L) >39 mg/dL Final     Direct Measure HDL   Date Value Ref Range Status   07/03/2024 32 (L) >=40 mg/dL Final   ]  GFR Estimate   Date Value Ref Range Status   07/03/2024 >90 >60 mL/min/1.73m2 Final     Comment:     eGFR calculated using 2021 CKD-EPI equation.   07/01/2021 65 >60 mL/min/[1.73_m2] Final     Comment:     Non  GFR Calc  Starting 12/18/2018, serum creatinine based estimated GFR (eGFR) will be   calculated using the Chronic Kidney Disease Epidemiology Collaboration   (CKD-EPI) equation.       GFR Estimate If Black   Date Value Ref Range Status   07/01/2021 75 >60 mL/min/[1.73_m2] Final     Comment:      GFR Calc  Starting 12/18/2018, serum creatinine based estimated GFR (eGFR) will be   calculated using the Chronic Kidney Disease Epidemiology Collaboration   (CKD-EPI) equation.       Lab Results   Component Value Date    CR 0.75 07/03/2024    CR 1.15 07/01/2021     No results found for: \"MICROALBUMIN\"    Healthy Eating:  Healthy Eating Assessed Today: Yes  Cultural/Mormonism diet restrictions?: No  Do you have any food allergies or intolerances?: No  How many times a week on average do you eat food made away from home (restaurant/take-out)?: 2  Meals include: Dinner, Breakfast, Evening Snack  Breakfast: 6:30-7am:  5oz OJ, protein drink (low carb), banana, coffee- w/ flavored creamer or sf creamer (~3- 4 carb choices)  Lunch: doing better with eating something at this time, drinks coffee throughout the day  Dinner: 5pm: fried " "fish, fries, diet pop  Snacks: HS: ice cream  Beverages: Water, Coffee, Juice, Diet soda, Other (see Comments)  Please elaborate:: Protein drink every morning    Being Active:  Being Active Assessed Today: Yes  Exercise:: Yes (does golf (sometimes walks))  Days per week of moderate to strenuous exercise (like a brisk walk): 7  On average, minutes per day of exercise at this level: 40  How intense was your typical exercise? : Light (like stretching or slow walking)  Exercise Minutes per Week: 280  Barrier to exercise: None    Monitoring:  Monitoring Assessed Today: Yes  Did patient bring glucose meter to appointment? : No  Blood Glucose Meter: Accu-chek  Times checking blood sugar at home (number): 2 (had gone a long time without checking)  Times checking blood sugar at home (per): Day  Blood glucose trend:    Date Breakfast  Lunch  Dinner  Bedtime    Before After Before After Before After    8/28/24 192         8/27 135         8/26 144  133       8/25 145     145    8/24     146     8/23 140         8/22 180           Based on 2 weeks of data:  32% in target, 0% FASTING BLOOD SUGAR in target    Taking Medications:  Diabetes Medication(s)       Sulfonylureas       glipiZIDE (GLUCOTROL XL) 5 MG 24 hr tablet Take 1 tablet (5 mg) by mouth daily Hold until patient calls for refill.            Taking Medication Assessed Today: Yes  Current Treatments: Oral Medication (taken by mouth) (Glipizide just increased recently)  Problems taking diabetes medications regularly?: No  Diabetes medication side effects?: Yes (increased shakiness, but \"I have had a shake forever\")    Problem Solving:                 Reducing Risks:  Reducing Risks Assessed Today: Yes  Diabetes Risks: Age over 45 years  CAD Risks: Diabetes Mellitus, Male sex  Has dilated eye exam at least once a year?: Yes  Sees dentist every 6 months?: Yes  Feet checked by healthcare provider in the last year?: Yes    Healthy Coping:  Healthy Coping Assessed Today: " Yes  Emotional response to diabetes: Acceptance  Informal Support system:: Children, Family, Parent, Spouse  Stage of change: ACTION (Actively working towards change)  Patient Activation Measure Survey Score:       No data to display                  Care Plan and Education Provided:  Healthy Eating: Portion control, Being Active: Finding a physical activity routine that works for you, Monitoring: Frequency of monitoring, Log and interpret results, and Purpose, Taking Medication: Action of prescribed medication(s) and When to take medication(s), Problem Solving: Low glucose - causes, signs/symptoms, treatment and prevention and When to call a health care provider, and Reducing Risks: Complications of diabetes and Goal for A1c, how it relates to glucose and how often to check    Blessing Ortgea RD, [facility], 8:42 AM, 8/28/2024    Time Spent: 60 minutes  Encounter Type: Individual    Any diabetes medication dose changes were made via the CDE Protocol per the patient's primary care provider. A copy of this encounter was shared with the provider.

## 2024-08-28 NOTE — PATIENT INSTRUCTIONS
PLAN     Increase exercise by walking your dog consistently twice around the loop and walking more when golfing.  Begin to consider what you will do in the winter. (Consider joining the Y.)  Continue to work on healthy eating 3 times/day.  Do not skip meals (add lunch).  Aim for 45-75 grams of carbohydrate/meal and 0-30 grams of carbohydrate/snack.    Consider recording food intake.  Consider an jj.    Increase BG testing to 3 times/day:  fasting, before and 2 hrs after the start of the meal.  Continue to take Glipizide as prescribed.  Weight loss goal of 11-22# by Jan 2025.  If you have 2 or more readings less than 70 mg/dL in one week reach out to your provider.    Follow up with Mo Granados as directed.  Have labs drawn as scheduled on 10/3/24.    Follow up with Diabetes Education 10/3/24 at 8am.  If you have questions you can send them through [employer] or call Diabetes Education Triage [phone].  For Scheduling call [phone].  Blessing Ortega RD, [facility] [place], 8:22 AM, 8/28/2024

## 2024-09-05 ENCOUNTER — TRANSFERRED RECORDS (OUTPATIENT)
Dept: HEALTH INFORMATION MANAGEMENT | Facility: CLINIC | Age: 69
End: 2024-09-05
Payer: COMMERCIAL

## 2024-09-11 ENCOUNTER — THERAPY VISIT (OUTPATIENT)
Dept: PHYSICAL THERAPY | Facility: CLINIC | Age: 69
End: 2024-09-11
Attending: STUDENT IN AN ORGANIZED HEALTH CARE EDUCATION/TRAINING PROGRAM
Payer: COMMERCIAL

## 2024-09-11 DIAGNOSIS — M25.551 HIP PAIN, RIGHT: Primary | ICD-10-CM

## 2024-09-11 PROCEDURE — 97110 THERAPEUTIC EXERCISES: CPT | Mod: GP | Performed by: PHYSICAL THERAPIST

## 2024-09-20 ENCOUNTER — VIRTUAL VISIT (OUTPATIENT)
Dept: FAMILY MEDICINE | Facility: CLINIC | Age: 69
End: 2024-09-20
Payer: COMMERCIAL

## 2024-09-20 DIAGNOSIS — U07.1 INFECTION DUE TO 2019 NOVEL CORONAVIRUS: Primary | ICD-10-CM

## 2024-09-20 PROCEDURE — 99213 OFFICE O/P EST LOW 20 MIN: CPT | Mod: 95 | Performed by: FAMILY MEDICINE

## 2024-09-20 NOTE — PROGRESS NOTES
SLICK is a 69 year old who is being evaluated via a billable video visit.    How would you like to obtain your AVS? MyChart  If the video visit is dropped, the invitation should be resent by: Send to e-mail at: [email or web address]  Will anyone else be joining your video visit? No      Assessment & Plan     Encounter Diagnoses   Name Primary?    Infection due to 2019 novel coronavirus Yes    - severe headaches and body aches.  Will add paxlovid.  Hold atorvastatin while on therapy.  He is not currently taking flonase.  Will hold until after his illness.       Subjective   SLICK is a 69 year old, presenting for the following health issues:  COVID Treatment  (Fever, body aches and pounding H/A since Wednesday, not able to sleep for 2 days, Tested positive for COVID on a  home COVID test kit)        2024    10:11 AM   Additional Questions   Roomed by xl     Video Start Time: 10:33 AM    Positive exposure from .  Three days of headaches, bodyaches, joint aches, fever, anorexia  Positive home covid test  Wife also positive  Palliative: tylenol, advil and pseudoephedrine. Not much relief.      History of Present Illness       Reason for visit:  Fever, body aches and H/A x wednesday   He is taking medications regularly.          Objective    Vitals - Patient Reported  Temperature (Patient Reported): 101  F (38.3  C)      Vitals:  No vitals were obtained today due to virtual visit.    Physical Exam   GENERAL: alert and no distress  EYES: Eyes grossly normal to inspection.  No discharge or erythema, or obvious scleral/conjunctival abnormalities.  RESP: No audible wheeze, cough, or visible cyanosis.    SKIN: Visible skin clear. No significant rash, abnormal pigmentation or lesions.  NEURO: Cranial nerves grossly intact.  Mentation and speech appropriate for age.  PSYCH: Appropriate affect, tone, and pace of words        Video-Visit Details    Type of service:  Video Visit   Video End Time:10:49  LATIA  Originating Location (pt. Location): Home    Distant Location (provider location):  On-site  Platform used for Video Visit: Daniele  Signed Electronically by: Parveen Rob MD

## 2024-09-20 NOTE — PATIENT INSTRUCTIONS
Current Outpatient Medications:     acyclovir (ZOVIRAX) 400 MG tablet - no change. , TAKE 1 TABLET BY MOUTH TWICE  DAILY, Disp: 180 tablet, Rfl: 3    aspirin (ASA) 81 MG EC tablet - no change. , Take 81 mg by mouth, Disp: , Rfl:     atorvastatin (LIPITOR) 40 MG tablet - hold while taking paxlovid    chlorthalidone (HYGROTON) 25 MG tablet - no change.     glipiZIDE (GLUCOTROL XL) 5 MG 24 hr tablet - no change    lisinopril (ZESTRIL) 20 MG tablet - no change.     sildenafil (VIAGRA) 100 MG tablet - no change    fluticasone (FLONASE) 50 MCG/ACT nasal spray - hold not taking

## 2024-09-24 ENCOUNTER — TRANSFERRED RECORDS (OUTPATIENT)
Dept: HEALTH INFORMATION MANAGEMENT | Facility: CLINIC | Age: 69
End: 2024-09-24
Payer: COMMERCIAL

## 2024-10-03 ENCOUNTER — LAB (OUTPATIENT)
Dept: LAB | Facility: CLINIC | Age: 69
End: 2024-10-03
Payer: COMMERCIAL

## 2024-10-03 ENCOUNTER — ALLIED HEALTH/NURSE VISIT (OUTPATIENT)
Dept: EDUCATION SERVICES | Facility: CLINIC | Age: 69
End: 2024-10-03
Payer: COMMERCIAL

## 2024-10-03 DIAGNOSIS — E11.65 TYPE 2 DIABETES MELLITUS WITH HYPERGLYCEMIA, WITHOUT LONG-TERM CURRENT USE OF INSULIN (H): Primary | ICD-10-CM

## 2024-10-03 DIAGNOSIS — E11.65 TYPE 2 DIABETES MELLITUS WITH HYPERGLYCEMIA, WITHOUT LONG-TERM CURRENT USE OF INSULIN (H): ICD-10-CM

## 2024-10-03 LAB
EST. AVERAGE GLUCOSE BLD GHB EST-MCNC: 177 MG/DL
HBA1C MFR BLD: 7.8 % (ref 0–5.6)

## 2024-10-03 PROCEDURE — 83036 HEMOGLOBIN GLYCOSYLATED A1C: CPT

## 2024-10-03 PROCEDURE — 36415 COLL VENOUS BLD VENIPUNCTURE: CPT

## 2024-10-03 PROCEDURE — 97802 MEDICAL NUTRITION INDIV IN: CPT | Performed by: DIETITIAN, REGISTERED

## 2024-10-03 NOTE — LETTER
10/3/2024         RE: Jules Cheng  [address] [address] [id number]        Dear Colleague,    Thank you for referring your patient, Jules Cheng, to the [facility]. Please see a copy of my visit note below.    Medical Nutrition Therapy for Diabetes  Visit Type:Initial assessment and intervention    Jules Cheng presents today for MNT and education related to type 2 diabetes.   He is accompanied by self.     ASSESSMENT:   Patient comments/concerns relating to nutrition: lately have been eating apple crisp, sweet corn    NUTRITION HISTORY:  Continues to record daily food intake on paper charts.  Has not looked into using an jj.    Breakfast: protein drink  Lunch: fruit (trying to do better  Dinner: sweet corn, fish  Snacks: HS - apple crisp  Beverages: water, coffee, diet soda    Misses meals? Often lunch  Eats out:  2 meals/per week     Previous diet education:  Yes     Food allergies/intolerances: No    EXERCISE: moderate regular exercise program which includes walking 40 minutes 7 days/week    SOCIO/ECONOMIC:   Lives with: self and spouse    BLOOD GLUCOSE MONITORING:  Patient glucose self monitoring as follows: one time daily.   BG meter: Accu-Chek Guide meter  BG results:        Date Breakfast  Lunch  Dinner  Bedtime    Before After Before After Before After    10/3 200         10/2 175         10/1 160         9/30 156         9/29 179         9/28 133         9/27 191             BG values are: Not in goal  Patient's most recent   Lab Results   Component Value Date    A1C 7.8 10/03/2024    A1C 6.1 07/01/2021    is meeting goal of <8.0    MEDICATIONS:  Current Outpatient Medications   Medication Sig Dispense Refill     acyclovir (ZOVIRAX) 400 MG tablet TAKE 1 TABLET BY MOUTH TWICE  DAILY 180 tablet 3     amoxicillin (AMOXIL) 500 MG capsule TAKE 4 CAPSULES (2,000 MG) BY MOUTH ONCE AS NEEDED (PRIOR TO DENTAL PROCEDURE) Strength: 500 mg 4 capsule 0      aspirin (ASA) 81 MG EC tablet Take 81 mg by mouth       atorvastatin (LIPITOR) 40 MG tablet TAKE 1 TABLET BY MOUTH DAILY 90 tablet 3     blood glucose (NO BRAND SPECIFIED) test strip Use to test blood sugar 2 times daily or as directed. 100 strip 2     blood glucose calibration (NO BRAND SPECIFIED) solution Use to calibrate blood glucose monitor as needed as directed. 1 each 11     blood glucose monitoring (SOFTCLIX) lancets Use to test blood sugar 3 times daily. 100 each 4     Calcium Carbonate (CALCIUM 600 PO) Take by mouth daily       chlorthalidone (HYGROTON) 25 MG tablet TAKE ONE-HALF TABLET BY MOUTH  DAILY 45 tablet 3     cholecalciferol (VITAMIN D3) 25 mcg (1000 units) capsule Take 1,000 Units by mouth       Cyanocobalamin (VITAMIN B12 PO)        fluticasone (FLONASE) 50 MCG/ACT nasal spray Spray 1 spray into both nostrils daily (Patient not taking: Reported on 7/3/2024)       glipiZIDE (GLUCOTROL XL) 5 MG 24 hr tablet Take 1 tablet (5 mg) by mouth daily Hold until patient calls for refill. 90 tablet 3     lisinopril (ZESTRIL) 20 MG tablet Take 1 tablet (20 mg) by mouth daily 90 tablet 3     sildenafil (VIAGRA) 100 MG tablet Take 1 tablet (100 mg) by mouth daily as needed (sex) 30 tablet 11     No current facility-administered medications for this visit.       LABS:  Lab Results   Component Value Date    A1C 7.8 10/03/2024    A1C 6.1 07/01/2021     Lab Results   Component Value Date     07/03/2024     04/04/2022    GLC 96 07/01/2021     Lab Results   Component Value Date    LDL 44 07/03/2024    LDL 47 07/01/2021     HDL Cholesterol   Date Value Ref Range Status   07/01/2021 36 (L) >39 mg/dL Final     Direct Measure HDL   Date Value Ref Range Status   07/03/2024 32 (L) >=40 mg/dL Final   ]  GFR Estimate   Date Value Ref Range Status   07/03/2024 >90 >60 mL/min/1.73m2 Final     Comment:     eGFR calculated using 2021 CKD-EPI equation.   07/01/2021 65 >60 mL/min/[1.73_m2] Final     Comment:     Non  " GFR Calc  Starting 12/18/2018, serum creatinine based estimated GFR (eGFR) will be   calculated using the Chronic Kidney Disease Epidemiology Collaboration   (CKD-EPI) equation.       Lab Results   Component Value Date    CR 0.75 07/03/2024    CR 1.15 07/01/2021     No results found for: \"MICROALBUMIN\"    ANTHROPOMETRICS:  Vitals: There were no vitals taken for this visit.  There is no height or weight on file to calculate BMI.      Wt Readings from Last 5 Encounters:   07/03/24 102.1 kg (225 lb)   07/27/23 102.1 kg (225 lb 2 oz)   12/05/22 101.6 kg (224 lb)   04/12/22 101.9 kg (224 lb 9.6 oz)   04/04/22 103.8 kg (228 lb 12.8 oz)       Weight Change: did not weigh today    NUTRITION DIAGNOSIS: Altered nutrition-related laboratory values related to diagnosis of T2DM as evidenced by elevated fasting BG    NUTRITION INTERVENTION:  Education Provided: My Plate Planner/Choose My Plate, Fiber Facts, Six Small Meals a Day, and mechanism of action of medications (options of glp-1 and sglt-2)    PATIENT'S BEHAVIOR CHANGE GOALS:   See Patient Instructions for patient stated behavior change goals. AVS was printed and given to patient at today's appointment.    MONITOR / EVALUATE:  RD will monitor/evaluate:  Blood Glucose / A1c    FOLLOW-UP:  Follow up with RD as needed.  PLAN    Increase exercise by walking your dog consistently twice around the loop and walking more when golfing.  Begin to consider what you will do in the winter. (Consider joining the Y.)  Continue to work on healthy eating 3 times/day.  Do not skip meals (add lunch).  Aim for 45-75 grams of carbohydrate/meal and 0-30 grams of carbohydrate/snack.    Consider recording food intake.  Consider an jj.    Increase BG testing to 3 times/day:  fasting, before and 2 hrs after the start of the meal.  Continue to take Glipizide as prescribed.  Weight loss goal of 11-22# by Jan 2025.  If you have 2 or more readings less than 70 mg/dL in one week reach " out to your provider.    Follow up with Mo Granados as directed (?January)  Follow up with Diabetes Education annually or sooner if needed.  If you have questions you can send them through [employer] or call Diabetes Education Triage [phone].  For Scheduling call [phone].  Blessing Ortega RD, [facility], 8:40 AM, 10/3/2024    Time spent in minutes: 45  Encounter: Individual

## 2024-10-03 NOTE — PATIENT INSTRUCTIONS
PLAN    Increase exercise by walking your dog consistently twice around the loop and walking more when golfing.  Begin to consider what you will do in the winter. (Consider joining the Y.)  Continue to work on healthy eating 3 times/day.  Do not skip meals (add lunch).  Aim for 45-75 grams of carbohydrate/meal and 0-30 grams of carbohydrate/snack.    Consider recording food intake.  Consider an jj.    Increase BG testing to 3 times/day:  fasting, before and 2 hrs after the start of the meal.  Continue to take Glipizide as prescribed.  Weight loss goal of 11-22# by Jan 2025.  If you have 2 or more readings less than 70 mg/dL in one week reach out to your provider.    Follow up with Mo Granados as directed (?January)  Follow up with Diabetes Education annually or sooner if needed.  If you have questions you can send them through [employer] or call Diabetes Education Triage [phone].  For Scheduling call [phone].  Blessing Ortega RD, [facility], 8:26 AM, 10/3/2024

## 2024-10-03 NOTE — PROGRESS NOTES
Medical Nutrition Therapy for Diabetes  Visit Type:Initial assessment and intervention    Jules Cheng presents today for MNT and education related to type 2 diabetes.   He is accompanied by self.     ASSESSMENT:   Patient comments/concerns relating to nutrition: lately have been eating apple crisp, sweet corn    NUTRITION HISTORY:  Continues to record daily food intake on paper charts.  Has not looked into using an jj.    Breakfast: protein drink  Lunch: fruit (trying to do better  Dinner: sweet corn, fish  Snacks: HS - apple crisp  Beverages: water, coffee, diet soda    Misses meals? Often lunch  Eats out:  2 meals/per week     Previous diet education:  Yes     Food allergies/intolerances: No    EXERCISE: moderate regular exercise program which includes walking 40 minutes 7 days/week    SOCIO/ECONOMIC:   Lives with: self and spouse    BLOOD GLUCOSE MONITORING:  Patient glucose self monitoring as follows: one time daily.   BG meter: Accu-Chek Guide meter  BG results:        Date Breakfast  Lunch  Dinner  Bedtime    Before After Before After Before After    10/3 200         10/2 175         10/1 160         9/30 156         9/29 179         9/28 133         9/27 191             BG values are: Not in goal  Patient's most recent   Lab Results   Component Value Date    A1C 7.8 10/03/2024    A1C 6.1 07/01/2021    is meeting goal of <8.0    MEDICATIONS:  Current Outpatient Medications   Medication Sig Dispense Refill    acyclovir (ZOVIRAX) 400 MG tablet TAKE 1 TABLET BY MOUTH TWICE  DAILY 180 tablet 3    amoxicillin (AMOXIL) 500 MG capsule TAKE 4 CAPSULES (2,000 MG) BY MOUTH ONCE AS NEEDED (PRIOR TO DENTAL PROCEDURE) Strength: 500 mg 4 capsule 0    aspirin (ASA) 81 MG EC tablet Take 81 mg by mouth      atorvastatin (LIPITOR) 40 MG tablet TAKE 1 TABLET BY MOUTH DAILY 90 tablet 3    blood glucose (NO BRAND SPECIFIED) test strip Use to test blood sugar 2 times daily or as directed. 100 strip 2    blood glucose  calibration (NO BRAND SPECIFIED) solution Use to calibrate blood glucose monitor as needed as directed. 1 each 11    blood glucose monitoring (SOFTCLIX) lancets Use to test blood sugar 3 times daily. 100 each 4    Calcium Carbonate (CALCIUM 600 PO) Take by mouth daily      chlorthalidone (HYGROTON) 25 MG tablet TAKE ONE-HALF TABLET BY MOUTH  DAILY 45 tablet 3    cholecalciferol (VITAMIN D3) 25 mcg (1000 units) capsule Take 1,000 Units by mouth      Cyanocobalamin (VITAMIN B12 PO)       fluticasone (FLONASE) 50 MCG/ACT nasal spray Spray 1 spray into both nostrils daily (Patient not taking: Reported on 7/3/2024)      glipiZIDE (GLUCOTROL XL) 5 MG 24 hr tablet Take 1 tablet (5 mg) by mouth daily Hold until patient calls for refill. 90 tablet 3    lisinopril (ZESTRIL) 20 MG tablet Take 1 tablet (20 mg) by mouth daily 90 tablet 3    sildenafil (VIAGRA) 100 MG tablet Take 1 tablet (100 mg) by mouth daily as needed (sex) 30 tablet 11     No current facility-administered medications for this visit.       LABS:  Lab Results   Component Value Date    A1C 7.8 10/03/2024    A1C 6.1 07/01/2021     Lab Results   Component Value Date     07/03/2024     04/04/2022    GLC 96 07/01/2021     Lab Results   Component Value Date    LDL 44 07/03/2024    LDL 47 07/01/2021     HDL Cholesterol   Date Value Ref Range Status   07/01/2021 36 (L) >39 mg/dL Final     Direct Measure HDL   Date Value Ref Range Status   07/03/2024 32 (L) >=40 mg/dL Final   ]  GFR Estimate   Date Value Ref Range Status   07/03/2024 >90 >60 mL/min/1.73m2 Final     Comment:     eGFR calculated using 2021 CKD-EPI equation.   07/01/2021 65 >60 mL/min/[1.73_m2] Final     Comment:     Non  GFR Calc  Starting 12/18/2018, serum creatinine based estimated GFR (eGFR) will be   calculated using the Chronic Kidney Disease Epidemiology Collaboration   (CKD-EPI) equation.       Lab Results   Component Value Date    CR 0.75 07/03/2024    CR 1.15  "07/01/2021     No results found for: \"MICROALBUMIN\"    ANTHROPOMETRICS:  Vitals: There were no vitals taken for this visit.  There is no height or weight on file to calculate BMI.      Wt Readings from Last 5 Encounters:   07/03/24 102.1 kg (225 lb)   07/27/23 102.1 kg (225 lb 2 oz)   12/05/22 101.6 kg (224 lb)   04/12/22 101.9 kg (224 lb 9.6 oz)   04/04/22 103.8 kg (228 lb 12.8 oz)       Weight Change: did not weigh today    NUTRITION DIAGNOSIS: Altered nutrition-related laboratory values related to diagnosis of T2DM as evidenced by elevated fasting BG    NUTRITION INTERVENTION:  Education Provided: My Plate Planner/Choose My Plate, Fiber Facts, Six Small Meals a Day, and mechanism of action of medications (options of glp-1 and sglt-2)    PATIENT'S BEHAVIOR CHANGE GOALS:   See Patient Instructions for patient stated behavior change goals. AVS was printed and given to patient at today's appointment.    MONITOR / EVALUATE:  RD will monitor/evaluate:  Blood Glucose / A1c    FOLLOW-UP:  Follow up with RD as needed.  PLAN    Increase exercise by walking your dog consistently twice around the loop and walking more when golfing.  Begin to consider what you will do in the winter. (Consider joining the Y.)  Continue to work on healthy eating 3 times/day.  Do not skip meals (add lunch).  Aim for 45-75 grams of carbohydrate/meal and 0-30 grams of carbohydrate/snack.    Consider recording food intake.  Consider an jj.    Increase BG testing to 3 times/day:  fasting, before and 2 hrs after the start of the meal.  Continue to take Glipizide as prescribed.  Weight loss goal of 11-22# by Jan 2025.  If you have 2 or more readings less than 70 mg/dL in one week reach out to your provider.    Follow up with Mo Granados as directed (?January)  Follow up with Diabetes Education annually or sooner if needed.  If you have questions you can send them through [employer] or call Diabetes Education Triage [phone].  For " Scheduling call [phone].  Blessing Ortega RD, [facility], 8:40 AM, 10/3/2024    Time spent in minutes: 45  Encounter: Individual

## 2024-10-29 ENCOUNTER — TRANSFERRED RECORDS (OUTPATIENT)
Dept: HEALTH INFORMATION MANAGEMENT | Facility: CLINIC | Age: 69
End: 2024-10-29
Payer: COMMERCIAL

## 2024-12-25 DIAGNOSIS — E11.9 TYPE 2 DIABETES MELLITUS WITHOUT COMPLICATION, WITHOUT LONG-TERM CURRENT USE OF INSULIN (H): ICD-10-CM

## 2024-12-26 RX ORDER — BLOOD SUGAR DIAGNOSTIC
STRIP MISCELLANEOUS
Qty: 200 STRIP | Refills: 2 | Status: SHIPPED | OUTPATIENT
Start: 2024-12-26

## 2025-03-26 DIAGNOSIS — E11.65 TYPE 2 DIABETES MELLITUS WITH HYPERGLYCEMIA, WITHOUT LONG-TERM CURRENT USE OF INSULIN (H): Primary | ICD-10-CM

## 2025-03-26 NOTE — TELEPHONE ENCOUNTER
Medication Question or Refill        What medication are you calling about (include dose and sig)?: Accu-chek FASTCLIX lancets- NOT the softclix ones that are on his chart    Preferred Pharmacy:     [facility]    Controlled Substance Agreement on file:   CSA -- Patient Level:    CSA: None found at the patient level.       Who prescribed the medication?: Moarun Granados    Do you need a refill? Yes    When did you use the medication last? Uses every day- had to use the Softclix type when there was a shortage but now wants to go go back to the Fastclix lancets    Patient offered an appointment? No    Do you have any questions or concerns?  No      Could we send this information to you in [facility] or would you prefer to receive a phone call?:   Patient would prefer a phone call   Okay to leave a detailed message?: Yes at Cell number on file:    Telephone Information:   Mobile [phone]

## 2025-04-12 ENCOUNTER — HEALTH MAINTENANCE LETTER (OUTPATIENT)
Age: 70
End: 2025-04-12

## 2025-05-24 DIAGNOSIS — E11.9 TYPE 2 DIABETES MELLITUS WITHOUT COMPLICATION, WITHOUT LONG-TERM CURRENT USE OF INSULIN (H): ICD-10-CM

## 2025-05-24 DIAGNOSIS — I10 BENIGN ESSENTIAL HYPERTENSION: ICD-10-CM

## 2025-05-24 DIAGNOSIS — E78.2 MIXED HYPERLIPIDEMIA: ICD-10-CM

## 2025-05-27 ENCOUNTER — MYC REFILL (OUTPATIENT)
Dept: FAMILY MEDICINE | Facility: CLINIC | Age: 70
End: 2025-05-27
Payer: COMMERCIAL

## 2025-05-27 DIAGNOSIS — Z96.649 HISTORY OF HIP REPLACEMENT, UNSPECIFIED LATERALITY: ICD-10-CM

## 2025-05-27 DIAGNOSIS — Z96.649 HISTORY OF TOTAL HIP REPLACEMENT, UNSPECIFIED LATERALITY: Primary | ICD-10-CM

## 2025-05-27 DIAGNOSIS — Z79.2 NEED FOR PROPHYLACTIC ANTIBIOTIC: ICD-10-CM

## 2025-05-27 DIAGNOSIS — Z96.643 HISTORY OF BILATERAL HIP REPLACEMENTS: ICD-10-CM

## 2025-05-27 RX ORDER — CHLORTHALIDONE 25 MG/1
12.5 TABLET ORAL DAILY
Qty: 45 TABLET | Refills: 3 | OUTPATIENT
Start: 2025-05-27

## 2025-05-27 RX ORDER — ATORVASTATIN CALCIUM 40 MG/1
40 TABLET, FILM COATED ORAL DAILY
Qty: 90 TABLET | Refills: 3 | OUTPATIENT
Start: 2025-05-27

## 2025-05-27 NOTE — TELEPHONE ENCOUNTER
Please call patient to ask ifhe needs amoxicillin again. It is not clear I this is an automatic request or if he requested new Rx for antibiotics before procedures.

## 2025-05-28 PROBLEM — Z96.649 HISTORY OF HIP REPLACEMENT, TOTAL: Status: ACTIVE | Noted: 2025-05-28

## 2025-05-28 PROBLEM — Z96.649: Status: ACTIVE | Noted: 2025-05-28

## 2025-05-28 RX ORDER — AMOXICILLIN 500 MG/1
CAPSULE ORAL
Qty: 4 CAPSULE | Refills: 0 | Status: SHIPPED | OUTPATIENT
Start: 2025-05-28

## 2025-05-28 NOTE — TELEPHONE ENCOUNTER
Pt returned call and states he has a dental procedure scheduled for 6/2 and is requesting antibx refill    Samaria Romero on 5/28/2025 at 8:27 AM

## 2025-06-02 DIAGNOSIS — Z96.649 HISTORY OF HIP REPLACEMENT, UNSPECIFIED LATERALITY: ICD-10-CM

## 2025-06-02 DIAGNOSIS — Z79.2 NEED FOR PROPHYLACTIC ANTIBIOTIC: ICD-10-CM

## 2025-06-02 DIAGNOSIS — Z96.643 HISTORY OF BILATERAL HIP REPLACEMENTS: ICD-10-CM

## 2025-06-02 RX ORDER — AMOXICILLIN 500 MG/1
CAPSULE ORAL
Qty: 4 CAPSULE | Refills: 1 | Status: SHIPPED | OUTPATIENT
Start: 2025-06-02

## 2025-06-02 NOTE — TELEPHONE ENCOUNTER
Medication Question or Refill        What medication are you calling about (include dose and sig)?: Pending Prescriptions:                       Disp   Refills    amoxicillin (AMOXIL) 500 MG capsule       4 caps*0            Sig: TAKE 4 CAPSULES (2,000 MG) BY MOUTH ONCE AS           NEEDED (PRIOR TO DENTAL PROCEDURE) Strength: 500           mg        Preferred Pharmacy:       [facility]      Controlled Substance Agreement on file:   CSA -- Patient Level:    CSA: None found at the patient level.       Who prescribed the medication?: Dr. Granados    Do you need a refill? Yes    Patient offered an appointment? No    Do you have any questions or concerns?  Yes: Pt  states he took his dose for his dental appt today but now he needs a crown tomorrow and will need another dose of antibiotics. He will also have another appt 6/24/25 and is hoping Dr. Granados can just give that to him early as well.      Could we send this information to you in [facility] or would you prefer to receive a phone call?:   Patient would prefer a phone call   Okay to leave a detailed message?: Yes at Cell number on file:    Telephone Information:   Mobile [phone]     Tamela Gallagher on 6/2/2025 at 9:39 AM

## 2025-06-03 ENCOUNTER — PATIENT OUTREACH (OUTPATIENT)
Dept: CARE COORDINATION | Facility: CLINIC | Age: 70
End: 2025-06-03
Payer: COMMERCIAL

## 2025-06-17 ENCOUNTER — PATIENT OUTREACH (OUTPATIENT)
Dept: CARE COORDINATION | Facility: CLINIC | Age: 70
End: 2025-06-17
Payer: COMMERCIAL

## 2025-07-14 ENCOUNTER — TELEPHONE (OUTPATIENT)
Dept: FAMILY MEDICINE | Facility: CLINIC | Age: 70
End: 2025-07-14
Payer: COMMERCIAL

## 2025-07-14 NOTE — TELEPHONE ENCOUNTER
Patient Quality Outreach    Patient is due for the following:   Diabetes -  A1C, LDL (Fasting), Microalbumin, Statin, and Foot Exam  Physical Annual Wellness Visit      Topic Date Due    COVID-19 Vaccine (9 - 2024-25 season) 05/15/2025       Action(s) Taken:   If patient calls back, schedule a Annual Wellness Visit    Type of outreach:    Sent [employer]PACE message.    Questions for provider review:    None         Cindy Schafer MA  Chart routed to None.

## 2025-08-01 DIAGNOSIS — I10 BENIGN ESSENTIAL HYPERTENSION: ICD-10-CM

## 2025-08-03 SDOH — HEALTH STABILITY: PHYSICAL HEALTH: ON AVERAGE, HOW MANY DAYS PER WEEK DO YOU ENGAGE IN MODERATE TO STRENUOUS EXERCISE (LIKE A BRISK WALK)?: 4 DAYS

## 2025-08-03 SDOH — HEALTH STABILITY: PHYSICAL HEALTH: ON AVERAGE, HOW MANY MINUTES DO YOU ENGAGE IN EXERCISE AT THIS LEVEL?: 60 MIN

## 2025-08-03 ASSESSMENT — SOCIAL DETERMINANTS OF HEALTH (SDOH): HOW OFTEN DO YOU GET TOGETHER WITH FRIENDS OR RELATIVES?: ONCE A WEEK

## 2025-08-04 RX ORDER — LISINOPRIL 20 MG/1
20 TABLET ORAL DAILY
Qty: 90 TABLET | Refills: 3 | Status: SHIPPED | OUTPATIENT
Start: 2025-08-04

## 2025-08-06 ENCOUNTER — OFFICE VISIT (OUTPATIENT)
Dept: FAMILY MEDICINE | Facility: CLINIC | Age: 70
End: 2025-08-06
Payer: COMMERCIAL

## 2025-08-06 VITALS
HEART RATE: 70 BPM | WEIGHT: 223.5 LBS | SYSTOLIC BLOOD PRESSURE: 110 MMHG | OXYGEN SATURATION: 94 % | TEMPERATURE: 97.6 F | HEIGHT: 71 IN | DIASTOLIC BLOOD PRESSURE: 60 MMHG | RESPIRATION RATE: 16 BRPM | BODY MASS INDEX: 31.29 KG/M2

## 2025-08-06 DIAGNOSIS — Z00.00 ENCOUNTER FOR MEDICARE ANNUAL WELLNESS EXAM: Primary | ICD-10-CM

## 2025-08-06 DIAGNOSIS — I25.10 CORONARY ARTERY CALCIFICATION SEEN ON CAT SCAN: ICD-10-CM

## 2025-08-06 DIAGNOSIS — E11.65 TYPE 2 DIABETES MELLITUS WITH HYPERGLYCEMIA, WITHOUT LONG-TERM CURRENT USE OF INSULIN (H): ICD-10-CM

## 2025-08-06 DIAGNOSIS — I10 PRIMARY HYPERTENSION: ICD-10-CM

## 2025-08-06 DIAGNOSIS — Z12.5 SCREENING FOR PROSTATE CANCER: ICD-10-CM

## 2025-08-06 LAB
ANION GAP SERPL CALCULATED.3IONS-SCNC: 14 MMOL/L (ref 7–15)
BUN SERPL-MCNC: 20 MG/DL (ref 8–23)
CALCIUM SERPL-MCNC: 9.3 MG/DL (ref 8.8–10.4)
CHLORIDE SERPL-SCNC: 100 MMOL/L (ref 98–107)
CHOLEST SERPL-MCNC: 114 MG/DL
CREAT SERPL-MCNC: 0.81 MG/DL (ref 0.67–1.17)
CREAT UR-MCNC: 125.5 MG/DL
EGFRCR SERPLBLD CKD-EPI 2021: >90 ML/MIN/1.73M2
EST. AVERAGE GLUCOSE BLD GHB EST-MCNC: 217 MG/DL
FASTING STATUS PATIENT QL REPORTED: YES
FASTING STATUS PATIENT QL REPORTED: YES
GLUCOSE SERPL-MCNC: 202 MG/DL (ref 70–99)
HBA1C MFR BLD: 9.2 % (ref 0–5.6)
HCO3 SERPL-SCNC: 22 MMOL/L (ref 22–29)
HDLC SERPL-MCNC: 27 MG/DL
LDLC SERPL CALC-MCNC: 53 MG/DL
MICROALBUMIN UR-MCNC: <12 MG/L
MICROALBUMIN/CREAT UR: NORMAL MG/G{CREAT}
NONHDLC SERPL-MCNC: 87 MG/DL
POTASSIUM SERPL-SCNC: 4.1 MMOL/L (ref 3.4–5.3)
PSA SERPL DL<=0.01 NG/ML-MCNC: 0.48 NG/ML (ref 0–6.5)
SODIUM SERPL-SCNC: 136 MMOL/L (ref 135–145)
TRIGL SERPL-MCNC: 171 MG/DL

## 2025-08-06 PROCEDURE — 83036 HEMOGLOBIN GLYCOSYLATED A1C: CPT | Performed by: FAMILY MEDICINE

## 2025-08-06 PROCEDURE — G0103 PSA SCREENING: HCPCS | Performed by: FAMILY MEDICINE

## 2025-08-06 PROCEDURE — 80061 LIPID PANEL: CPT | Performed by: FAMILY MEDICINE

## 2025-08-06 PROCEDURE — 80048 BASIC METABOLIC PNL TOTAL CA: CPT | Performed by: FAMILY MEDICINE

## 2025-08-06 PROCEDURE — 36415 COLL VENOUS BLD VENIPUNCTURE: CPT | Performed by: FAMILY MEDICINE

## 2025-08-06 PROCEDURE — 82043 UR ALBUMIN QUANTITATIVE: CPT | Performed by: FAMILY MEDICINE

## 2025-08-06 PROCEDURE — 82570 ASSAY OF URINE CREATININE: CPT | Performed by: FAMILY MEDICINE

## 2025-08-06 ASSESSMENT — PAIN SCALES - GENERAL: PAINLEVEL_OUTOF10: NO PAIN (0)

## 2025-08-07 ENCOUNTER — PATIENT OUTREACH (OUTPATIENT)
Dept: CARE COORDINATION | Facility: CLINIC | Age: 70
End: 2025-08-07
Payer: COMMERCIAL

## 2025-08-11 ENCOUNTER — PATIENT OUTREACH (OUTPATIENT)
Dept: CARE COORDINATION | Facility: CLINIC | Age: 70
End: 2025-08-11
Payer: COMMERCIAL